# Patient Record
Sex: FEMALE | Race: WHITE | NOT HISPANIC OR LATINO
[De-identification: names, ages, dates, MRNs, and addresses within clinical notes are randomized per-mention and may not be internally consistent; named-entity substitution may affect disease eponyms.]

---

## 2017-01-09 ENCOUNTER — RX RENEWAL (OUTPATIENT)
Age: 67
End: 2017-01-09

## 2017-01-10 ENCOUNTER — MESSAGE (OUTPATIENT)
Age: 67
End: 2017-01-10

## 2017-01-10 LAB
APPEARANCE: CLEAR
BILIRUBIN URINE: NEGATIVE
BLOOD URINE: NEGATIVE
COLOR: YELLOW
GLUCOSE QUALITATIVE U: NORMAL MG/DL
KETONES URINE: NEGATIVE
LEUKOCYTE ESTERASE URINE: NEGATIVE
NITRITE URINE: NEGATIVE
PH URINE: 6.5
PROTEIN URINE: NEGATIVE MG/DL
SPECIFIC GRAVITY URINE: 1.01
UROBILINOGEN URINE: NORMAL MG/DL

## 2017-04-21 ENCOUNTER — APPOINTMENT (OUTPATIENT)
Dept: INTERNAL MEDICINE | Facility: CLINIC | Age: 67
End: 2017-04-21

## 2017-04-21 VITALS
WEIGHT: 128 LBS | BODY MASS INDEX: 18.9 KG/M2 | OXYGEN SATURATION: 95 % | DIASTOLIC BLOOD PRESSURE: 70 MMHG | SYSTOLIC BLOOD PRESSURE: 120 MMHG | RESPIRATION RATE: 15 BRPM | HEART RATE: 80 BPM

## 2017-04-24 LAB
25(OH)D3 SERPL-MCNC: 43.6 NG/ML
ALBUMIN SERPL ELPH-MCNC: 4.1 G/DL
ALP BLD-CCNC: 93 U/L
ALT SERPL-CCNC: 18 U/L
ANION GAP SERPL CALC-SCNC: 20 MMOL/L
APPEARANCE: CLEAR
AST SERPL-CCNC: 17 U/L
BASOPHILS # BLD AUTO: 0.04 K/UL
BASOPHILS NFR BLD AUTO: 0.6 %
BILIRUB SERPL-MCNC: 0.3 MG/DL
BILIRUBIN URINE: NEGATIVE
BLOOD URINE: NEGATIVE
BUN SERPL-MCNC: 17 MG/DL
CALCIUM SERPL-MCNC: 10.2 MG/DL
CHLORIDE SERPL-SCNC: 102 MMOL/L
CHOLEST SERPL-MCNC: 232 MG/DL
CHOLEST/HDLC SERPL: 2.5 RATIO
CO2 SERPL-SCNC: 19 MMOL/L
COLOR: YELLOW
CREAT SERPL-MCNC: 0.86 MG/DL
EOSINOPHIL # BLD AUTO: 0.11 K/UL
EOSINOPHIL NFR BLD AUTO: 1.6 %
GLUCOSE QUALITATIVE U: NORMAL MG/DL
GLUCOSE SERPL-MCNC: 92 MG/DL
HBA1C MFR BLD HPLC: 5.8 %
HCT VFR BLD CALC: 42.2 %
HDLC SERPL-MCNC: 92 MG/DL
HGB BLD-MCNC: 13.3 G/DL
IMM GRANULOCYTES NFR BLD AUTO: 0.1 %
KETONES URINE: NEGATIVE
LDLC SERPL CALC-MCNC: 124 MG/DL
LEUKOCYTE ESTERASE URINE: ABNORMAL
LYMPHOCYTES # BLD AUTO: 2.04 K/UL
LYMPHOCYTES NFR BLD AUTO: 29.6 %
MAN DIFF?: NORMAL
MCHC RBC-ENTMCNC: 31.5 GM/DL
MCHC RBC-ENTMCNC: 32 PG
MCV RBC AUTO: 101.4 FL
MONOCYTES # BLD AUTO: 0.82 K/UL
MONOCYTES NFR BLD AUTO: 11.9 %
NEUTROPHILS # BLD AUTO: 3.88 K/UL
NEUTROPHILS NFR BLD AUTO: 56.2 %
NITRITE URINE: NEGATIVE
PH URINE: 6
PLATELET # BLD AUTO: 352 K/UL
POTASSIUM SERPL-SCNC: 5 MMOL/L
PROT SERPL-MCNC: 7.2 G/DL
PROTEIN URINE: NEGATIVE MG/DL
RBC # BLD: 4.16 M/UL
RBC # FLD: 13.9 %
SODIUM SERPL-SCNC: 141 MMOL/L
SPECIFIC GRAVITY URINE: 1.01
TRIGL SERPL-MCNC: 79 MG/DL
UROBILINOGEN URINE: NORMAL MG/DL
WBC # FLD AUTO: 6.9 K/UL

## 2018-06-04 ENCOUNTER — APPOINTMENT (OUTPATIENT)
Dept: INTERNAL MEDICINE | Facility: CLINIC | Age: 68
End: 2018-06-04
Payer: COMMERCIAL

## 2018-06-04 VITALS
WEIGHT: 128 LBS | DIASTOLIC BLOOD PRESSURE: 76 MMHG | BODY MASS INDEX: 18.9 KG/M2 | SYSTOLIC BLOOD PRESSURE: 146 MMHG | HEART RATE: 83 BPM | OXYGEN SATURATION: 93 % | TEMPERATURE: 98.3 F

## 2018-06-04 PROCEDURE — 99397 PER PM REEVAL EST PAT 65+ YR: CPT | Mod: 25

## 2018-06-04 PROCEDURE — 93000 ELECTROCARDIOGRAM COMPLETE: CPT

## 2018-06-04 PROCEDURE — 36415 COLL VENOUS BLD VENIPUNCTURE: CPT

## 2018-06-04 NOTE — HISTORY OF PRESENT ILLNESS
[FreeTextEntry1] : wellness and insomnia [de-identified] : Under a lot of stress richelle her mother  last year.\par Issue with sleep.  \par Brought in shingrix \par mammo uptodate  Dr Kay\par Last vieira density a few years ago\par FOr colonoscopy for this year -with Dr Myron Goldberg.  \par

## 2018-06-04 NOTE — PLAN
[FreeTextEntry1] : shingrix admin today: lot [id number], exp 7/13/20\par wellness complete\par labs today\par colonoscopy for thie year\par  f/up annually

## 2018-06-05 LAB
ALBUMIN SERPL ELPH-MCNC: 4.8 G/DL
ALP BLD-CCNC: 72 U/L
ALT SERPL-CCNC: 21 U/L
ANION GAP SERPL CALC-SCNC: 17 MMOL/L
APPEARANCE: CLEAR
AST SERPL-CCNC: 25 U/L
BASOPHILS # BLD AUTO: 0.03 K/UL
BASOPHILS NFR BLD AUTO: 0.5 %
BILIRUB SERPL-MCNC: 0.4 MG/DL
BILIRUBIN URINE: NEGATIVE
BLOOD URINE: NEGATIVE
BUN SERPL-MCNC: 28 MG/DL
CALCIUM SERPL-MCNC: 10.2 MG/DL
CHLORIDE SERPL-SCNC: 100 MMOL/L
CHOLEST SERPL-MCNC: 273 MG/DL
CHOLEST/HDLC SERPL: 2.5 RATIO
CO2 SERPL-SCNC: 24 MMOL/L
COLOR: YELLOW
CREAT SERPL-MCNC: 0.9 MG/DL
EOSINOPHIL # BLD AUTO: 0.18 K/UL
EOSINOPHIL NFR BLD AUTO: 3 %
GLUCOSE QUALITATIVE U: NEGATIVE MG/DL
GLUCOSE SERPL-MCNC: 100 MG/DL
HBA1C MFR BLD HPLC: 5.5 %
HCT VFR BLD CALC: 43.6 %
HDLC SERPL-MCNC: 111 MG/DL
HGB BLD-MCNC: 14 G/DL
IMM GRANULOCYTES NFR BLD AUTO: 0 %
KETONES URINE: NEGATIVE
LDLC SERPL CALC-MCNC: 148 MG/DL
LEUKOCYTE ESTERASE URINE: NEGATIVE
LYMPHOCYTES # BLD AUTO: 1.99 K/UL
LYMPHOCYTES NFR BLD AUTO: 32.6 %
MAN DIFF?: NORMAL
MCHC RBC-ENTMCNC: 32.1 GM/DL
MCHC RBC-ENTMCNC: 32.6 PG
MCV RBC AUTO: 101.6 FL
MONOCYTES # BLD AUTO: 0.54 K/UL
MONOCYTES NFR BLD AUTO: 8.9 %
NEUTROPHILS # BLD AUTO: 3.36 K/UL
NEUTROPHILS NFR BLD AUTO: 55 %
NITRITE URINE: NEGATIVE
PH URINE: 6.5
PLATELET # BLD AUTO: 274 K/UL
POTASSIUM SERPL-SCNC: 5 MMOL/L
PROT SERPL-MCNC: 7.4 G/DL
PROTEIN URINE: NEGATIVE MG/DL
RBC # BLD: 4.29 M/UL
RBC # FLD: 14.4 %
SODIUM SERPL-SCNC: 141 MMOL/L
SPECIFIC GRAVITY URINE: 1.01
TRIGL SERPL-MCNC: 72 MG/DL
UROBILINOGEN URINE: NEGATIVE MG/DL
WBC # FLD AUTO: 6.1 K/UL

## 2018-08-20 ENCOUNTER — APPOINTMENT (OUTPATIENT)
Dept: INTERNAL MEDICINE | Facility: CLINIC | Age: 68
End: 2018-08-20
Payer: COMMERCIAL

## 2018-08-20 VITALS
OXYGEN SATURATION: 96 % | TEMPERATURE: 98.5 F | HEIGHT: 69 IN | WEIGHT: 125 LBS | DIASTOLIC BLOOD PRESSURE: 80 MMHG | HEART RATE: 68 BPM | SYSTOLIC BLOOD PRESSURE: 138 MMHG | BODY MASS INDEX: 18.51 KG/M2

## 2018-08-20 DIAGNOSIS — Z23 ENCOUNTER FOR IMMUNIZATION: ICD-10-CM

## 2018-08-20 PROCEDURE — 99212 OFFICE O/P EST SF 10 MIN: CPT | Mod: 25

## 2018-08-20 PROCEDURE — 90750 HZV VACC RECOMBINANT IM: CPT

## 2018-08-20 PROCEDURE — 90471 IMMUNIZATION ADMIN: CPT

## 2018-08-20 NOTE — PHYSICAL EXAM
[No Acute Distress] : no acute distress [Well Nourished] : well nourished [Well Developed] : well developed [Normal Insight/Judgement] : insight and judgment were intact

## 2018-09-24 ENCOUNTER — APPOINTMENT (OUTPATIENT)
Dept: INTERNAL MEDICINE | Facility: CLINIC | Age: 68
End: 2018-09-24
Payer: COMMERCIAL

## 2018-09-24 VITALS
SYSTOLIC BLOOD PRESSURE: 130 MMHG | RESPIRATION RATE: 14 BRPM | TEMPERATURE: 98.8 F | DIASTOLIC BLOOD PRESSURE: 70 MMHG | HEART RATE: 74 BPM | OXYGEN SATURATION: 95 %

## 2018-09-24 DIAGNOSIS — Z23 ENCOUNTER FOR IMMUNIZATION: ICD-10-CM

## 2018-09-24 PROCEDURE — G0008: CPT

## 2018-09-24 PROCEDURE — 99213 OFFICE O/P EST LOW 20 MIN: CPT | Mod: 25

## 2018-09-24 PROCEDURE — 90662 IIV NO PRSV INCREASED AG IM: CPT

## 2018-09-24 NOTE — HISTORY OF PRESENT ILLNESS
[FreeTextEntry1] : blood pressure check [de-identified] : Doing well on losartan, no issue\par would like flu shot  today

## 2019-04-16 ENCOUNTER — FORM ENCOUNTER (OUTPATIENT)
Age: 69
End: 2019-04-16

## 2019-04-17 ENCOUNTER — APPOINTMENT (OUTPATIENT)
Dept: ULTRASOUND IMAGING | Facility: HOSPITAL | Age: 69
End: 2019-04-17

## 2019-04-17 ENCOUNTER — OUTPATIENT (OUTPATIENT)
Dept: OUTPATIENT SERVICES | Facility: HOSPITAL | Age: 69
LOS: 1 days | End: 2019-04-17
Payer: COMMERCIAL

## 2019-04-17 PROCEDURE — 93970 EXTREMITY STUDY: CPT

## 2019-04-17 PROCEDURE — 93970 EXTREMITY STUDY: CPT | Mod: 26

## 2019-05-02 ENCOUNTER — APPOINTMENT (OUTPATIENT)
Dept: PHYSICAL MEDICINE AND REHAB | Facility: CLINIC | Age: 69
End: 2019-05-02
Payer: COMMERCIAL

## 2019-05-02 ENCOUNTER — FORM ENCOUNTER (OUTPATIENT)
Age: 69
End: 2019-05-02

## 2019-05-02 VITALS
OXYGEN SATURATION: 97 % | BODY MASS INDEX: 18.51 KG/M2 | RESPIRATION RATE: 16 BRPM | HEART RATE: 74 BPM | HEIGHT: 69 IN | WEIGHT: 125 LBS

## 2019-05-02 DIAGNOSIS — Z87.09 PERSONAL HISTORY OF OTHER DISEASES OF THE RESPIRATORY SYSTEM: ICD-10-CM

## 2019-05-02 DIAGNOSIS — Z80.0 FAMILY HISTORY OF MALIGNANT NEOPLASM OF DIGESTIVE ORGANS: ICD-10-CM

## 2019-05-02 DIAGNOSIS — Z86.79 PERSONAL HISTORY OF OTHER DISEASES OF THE CIRCULATORY SYSTEM: ICD-10-CM

## 2019-05-02 DIAGNOSIS — Z87.39 PERSONAL HISTORY OF OTHER DISEASES OF THE MUSCULOSKELETAL SYSTEM AND CONNECTIVE TISSUE: ICD-10-CM

## 2019-05-02 DIAGNOSIS — Z82.62 FAMILY HISTORY OF OSTEOPOROSIS: ICD-10-CM

## 2019-05-02 PROCEDURE — 99203 OFFICE O/P NEW LOW 30 MIN: CPT

## 2019-05-03 ENCOUNTER — APPOINTMENT (OUTPATIENT)
Dept: RADIOLOGY | Facility: CLINIC | Age: 69
End: 2019-05-03

## 2019-05-03 ENCOUNTER — OUTPATIENT (OUTPATIENT)
Dept: OUTPATIENT SERVICES | Facility: HOSPITAL | Age: 69
LOS: 1 days | End: 2019-05-03
Payer: COMMERCIAL

## 2019-05-03 ENCOUNTER — MESSAGE (OUTPATIENT)
Age: 69
End: 2019-05-03

## 2019-05-03 ENCOUNTER — APPOINTMENT (OUTPATIENT)
Dept: ORTHOPEDIC SURGERY | Facility: CLINIC | Age: 69
End: 2019-05-03
Payer: COMMERCIAL

## 2019-05-03 VITALS — RESPIRATION RATE: 16 BRPM | HEIGHT: 69 IN | WEIGHT: 125 LBS | BODY MASS INDEX: 18.51 KG/M2

## 2019-05-03 PROCEDURE — 73630 X-RAY EXAM OF FOOT: CPT | Mod: 26,RT

## 2019-05-03 PROCEDURE — 73630 X-RAY EXAM OF FOOT: CPT

## 2019-05-03 PROCEDURE — 73610 X-RAY EXAM OF ANKLE: CPT | Mod: 26,RT

## 2019-05-03 PROCEDURE — 99203 OFFICE O/P NEW LOW 30 MIN: CPT

## 2019-05-03 PROCEDURE — 73610 X-RAY EXAM OF ANKLE: CPT

## 2019-05-04 ENCOUNTER — TRANSCRIPTION ENCOUNTER (OUTPATIENT)
Age: 69
End: 2019-05-04

## 2019-05-05 NOTE — HISTORY OF PRESENT ILLNESS
[FreeTextEntry1] : Referring Provider: Dr. Kevin Resendez\par PCP: Dr. Kristofer Rogers\par Chief Complaint: Right ankle and calf pain \par Date of Injury/onset: 02/20/2019\par \par 68 year old female community ambulator presents for an evaluation of right ankle and calf pain that has been ongoing for the past 2 months without any specific injury. She reports that she was walking on the sidewalk when she started having a lot of tightness and pain in the ankle. \par

## 2019-05-05 NOTE — DISCUSSION/SUMMARY
[de-identified] : 67 yo F with right calf pain consistent with vascular claudication in the setting of a gastrocnemius contracture. I had a long conversation with the patient regarding her pathology. She had previously had a duplex ultrasound which did not show evidence of a DVT, however, there was not arterial evaluation. We will initiate conservative treatment of her gastroc contracture and see if she can improved with aggressive PT and stretching regimen. Considering the patient feels better in heels, I am hopefully she will improve appropriately with conservative therapy. \par  1. Follow up in 2 months. No new images needed at follow up \par 2. Initiate physical therapy for gastroc stretching \par 3.Patient was given a handout explaining posterior chain stretching, a theraband for resistance exercise and gastroc stretching and explained the importance of stretching.\par 4. Posterior heel wedges were given to patient\par 5. Information on gel heel cups, slant board and night splints\par 5. Referral to Vascular was given to see patient

## 2019-05-05 NOTE — CONSULT LETTER
[Dear  ___] : Dear  [unfilled], [FreeTextEntry2] : Dr. Kristofer Rogers [FreeTextEntry1] : Today I had the pleasure of evaluating your very nice patient SALVATORE ESCOBEDO who requested that I share my findings with you. I very much appreciate the referral. \par  \par Please review my office note below and, needless to say, please call or email me with any questions or concerns.\par  \par I appreciate the opportunity to participate in her care.\par  \par Sincerely,\par  \par Rudy Myesr MD\par Orthopaedic Surgery\par Foot and Ankle Surgery\par [facility]\par Office: [phone]\par Fax:     [phone]\par Email:  [email or web address] \par [email or web address]\par \par

## 2019-05-05 NOTE — REVIEW OF SYSTEMS
[Joint Stiffness] : joint stiffness [Joint Pain] : joint pain [Joint Swelling] : joint swelling [FreeTextEntry9] : Review of Systems no feelings of weakness \par All other review of systems are negative except as noted. \par Constitutional: no chills, not feeling tired and no fever. \par Eyes: no discharge, no pain and no redness. \par ENT: no nasal discharge, no nosebleeds and no sore throat. \par Cardiovascular: no chest pain, no palpitations and the heart rate was not fast. \par Respiratory: no shortness of breath, no cough and no wheezing. \par Gastrointestinal: no abdominal pain, no diarrhea, no vomiting and no melena. \par Genitourinary: no pelvic pain, no dysuria, no abnormal urethral discharge and no frequency. \par Integumentary: no skin lesions and no change in a mole. \par Neurological: no dizziness, no fainting and no convulsions. \par Endocrine: no deepening of the voice and no hot flashes. \par Hematologic/Lymphatic: does not bleed easily, no swollen glands and no cervical lymphadenopathy.\par Musculoskeletal: [as per HPI, otherwise denies additional joint pain, effusions, stiffness.]\par \par Please refer to the attached intake form for additional history and details.

## 2019-05-05 NOTE — PHYSICAL EXAM
[de-identified] : General: Patient is awake and alert, demonstrates appropriate mood and affect, exhibits normal breathing and is in no acute distress.\par Psych:  The patient is appropriately dressed and groomed, maintains good eye contact.  Alert and oriented x 3.  Normal attention/concentration, fund of knowledge and recall.  Normal speech rate and rhythm. No hallucinations, suicidal or homicidal ideations.  Demonstrates expected level of insight and judgment regarding health.\par Skin: The patient has no chronic skin lesions, rashes, or ulcers.  There is no induration or erythema of uninvolved extremities.  For skin exam of involved extremity refer to detailed musculoskeletal/extremity exam. \par Cardiovascular: No visible jugular venous distention.  There is brisk capillary refill in the digits of the affected extremity. They are symmetric pulses in the bilateral upper and lower extremities. \par Respiratory: The patient is in no apparent respiratory distress. They're taking full deep breaths with normal excursion, without use of accessory muscles or evidence of audible wheezes or stridor without the use of a stethoscope. \par Neurological: 5/5 motor strength and sensation intact throughout uninvolved upper and lower extremities, refer to detailed musculoskeletal exam regarding involved extremity.\par Neck:  Full range of motion with flexion, extension, rotation, and side bending;  no palpable crepitus, normal alignment and lordosis, symmetric appearance, midline trachea, no thyroid hypertrophy or nodules\par \par +Bilateral gastrocnemius contractures with inability to dorsiflex beyond 5 degrees with knee extended bilaterally\par + painless varicosities\par \par RLE \par No TTP about the tibial shaft or posteromedial aspect of the tibia\par No calf TTP\par SILT SSSPDPT\par +EHL FHL TA GSC\par No pain with single limb heel rise\par BCR WWP, compartments soft and compressible\par Multiple distended varicosities that are non TTP\par Skin intact, poor skin turgor [de-identified] : WB R ankle xrays. no fractures dislocations or subluxations. ankle mortise intact.

## 2019-05-20 ENCOUNTER — APPOINTMENT (OUTPATIENT)
Dept: VASCULAR SURGERY | Facility: CLINIC | Age: 69
End: 2019-05-20
Payer: COMMERCIAL

## 2019-05-20 ENCOUNTER — APPOINTMENT (OUTPATIENT)
Dept: INTERNAL MEDICINE | Facility: CLINIC | Age: 69
End: 2019-05-20
Payer: COMMERCIAL

## 2019-05-20 VITALS
TEMPERATURE: 98.5 F | OXYGEN SATURATION: 95 % | HEART RATE: 82 BPM | DIASTOLIC BLOOD PRESSURE: 80 MMHG | WEIGHT: 127 LBS | SYSTOLIC BLOOD PRESSURE: 150 MMHG | BODY MASS INDEX: 18.75 KG/M2 | RESPIRATION RATE: 14 BRPM

## 2019-05-20 VITALS — DIASTOLIC BLOOD PRESSURE: 88 MMHG | SYSTOLIC BLOOD PRESSURE: 167 MMHG | OXYGEN SATURATION: 93 % | HEART RATE: 87 BPM

## 2019-05-20 PROCEDURE — 99214 OFFICE O/P EST MOD 30 MIN: CPT

## 2019-05-20 PROCEDURE — 36415 COLL VENOUS BLD VENIPUNCTURE: CPT

## 2019-05-20 PROCEDURE — 93923 UPR/LXTR ART STDY 3+ LVLS: CPT

## 2019-05-20 PROCEDURE — 99244 OFF/OP CNSLTJ NEW/EST MOD 40: CPT

## 2019-05-20 RX ORDER — LOSARTAN POTASSIUM 50 MG/1
50 TABLET, FILM COATED ORAL DAILY
Qty: 90 | Refills: 3 | Status: DISCONTINUED | COMMUNITY
Start: 2018-08-27 | End: 2019-05-20

## 2019-05-20 RX ORDER — CILOSTAZOL 50 MG/1
50 TABLET ORAL
Qty: 180 | Refills: 1 | Status: DISCONTINUED | COMMUNITY
Start: 2019-05-20 | End: 2019-05-20

## 2019-05-20 NOTE — PLAN
[FreeTextEntry1] : Uncontrolled HTN - May be related to Losartan- not as effective as valsartan?\par Change to losartan 100/25mg and check labs today\par get a bp cuff for home and keep a diary for home\par f/up 4 weeks in the office or sooner if bp not controled

## 2019-05-20 NOTE — PHYSICAL EXAM
[No Acute Distress] : no acute distress [Well Nourished] : well nourished [Well Developed] : well developed [Normal Oropharynx] : the oropharynx was normal [Supple] : supple [No Lymphadenopathy] : no lymphadenopathy [No Respiratory Distress] : no respiratory distress  [Clear to Auscultation] : lungs were clear to auscultation bilaterally [No Accessory Muscle Use] : no accessory muscle use [Normal Rate] : normal rate  [Regular Rhythm] : with a regular rhythm [Normal S1, S2] : normal S1 and S2 [No Murmur] : no murmur heard [No Edema] : there was no peripheral edema [Normal Affect] : the affect was normal [Normal Insight/Judgement] : insight and judgment were intact

## 2019-05-21 LAB
ANION GAP SERPL CALC-SCNC: 14 MMOL/L
BUN SERPL-MCNC: 20 MG/DL
CALCIUM SERPL-MCNC: 10.2 MG/DL
CHLORIDE SERPL-SCNC: 102 MMOL/L
CO2 SERPL-SCNC: 24 MMOL/L
CREAT SERPL-MCNC: 0.8 MG/DL
GLUCOSE SERPL-MCNC: 101 MG/DL
MAGNESIUM SERPL-MCNC: 2.2 MG/DL
POTASSIUM SERPL-SCNC: 4.6 MMOL/L
SODIUM SERPL-SCNC: 140 MMOL/L
TSH SERPL-ACNC: 1.9 UIU/ML

## 2019-05-23 NOTE — ADDENDUM
[FreeTextEntry1] : This note was written by Rosemary Colindres on 05/20/2019  acting as scribe for Dr. Hinton.

## 2019-05-23 NOTE — HISTORY OF PRESENT ILLNESS
[FreeTextEntry1] : 69 y/o F with HTN and asthma s/p melanoma resection in the back (2011) and calf (2014) and s/p breast aspirations and 2 cysts removed in the 1990s presents today for initial evaluation of claudication in the RLE that began at the start of 2019. She reports a feeling of "tightness, twisting and pulling" in the right calf when she walks. Patient reports that she can only walk half a block before her R calf begins to hurt but she continues walking through the pain. She consulted with orthopedist, Dr. Myers, who sent her to physical therapy. She has been doing calf stretches in PT and reports that her pain has improved.

## 2019-05-23 NOTE — PHYSICAL EXAM
[Respiratory Effort] : normal respiratory effort [Alert] : alert [Calm] : calm [No Rash or Lesion] : No rash or lesion [JVD] : no jugular venous distention  [de-identified] : Well appearing. NAD [de-identified] : NCAT [de-identified] : FROM

## 2019-05-23 NOTE — END OF VISIT
[FreeTextEntry3] : All medical record entries made by the Scribe were at my, Dr. Lopez's, discretion and personally dictated by me on 05/20/2019 . I have reviewed the chart and agree that the record accurately reflects my personal performance of the history, physical exam, assessment and plan. I have also personally directed, reviewed and agreed to the chart.

## 2019-05-23 NOTE — ASSESSMENT
[FreeTextEntry1] : 69 y/o F with HTN and asthma presents with claudication in the RLE. RLE US demonstrates blunted waveforms with monophasic appearance. Moderate decrease in pressure and abnormal ankle brachial index suggestive of arterial occlusive disease consistent with arterial claudication. Physical therapy may relieve symptoms but it won't get rid of the blockage. I prescribed Pleital BID and advised her to walk daily for 6 weeks. She should walk 5 blocks in the morning and 5 blocks at night without stopping. Increase the distance by one block every 5 days. If her pain doesn't improve, we may schedule an angioplasty. \par Follow up in 6 weeks.

## 2019-05-23 NOTE — PROCEDURE
[FreeTextEntry1] : RLE US demonstrates blunted waveforms with monophasic appearance. Moderate decrease in pressure and abnormal ankle brachial index suggestive of arterial occlusive disease consistent with arterial claudication.

## 2019-05-31 ENCOUNTER — CLINICAL ADVICE (OUTPATIENT)
Age: 69
End: 2019-05-31

## 2019-06-03 ENCOUNTER — MESSAGE (OUTPATIENT)
Age: 69
End: 2019-06-03

## 2019-06-26 ENCOUNTER — LABORATORY RESULT (OUTPATIENT)
Age: 69
End: 2019-06-26

## 2019-06-26 ENCOUNTER — APPOINTMENT (OUTPATIENT)
Dept: INTERNAL MEDICINE | Facility: CLINIC | Age: 69
End: 2019-06-26
Payer: COMMERCIAL

## 2019-06-26 VITALS
HEIGHT: 69 IN | SYSTOLIC BLOOD PRESSURE: 144 MMHG | TEMPERATURE: 98.5 F | HEART RATE: 101 BPM | WEIGHT: 124 LBS | DIASTOLIC BLOOD PRESSURE: 74 MMHG | BODY MASS INDEX: 18.37 KG/M2

## 2019-06-26 PROCEDURE — 93000 ELECTROCARDIOGRAM COMPLETE: CPT

## 2019-06-26 PROCEDURE — 99397 PER PM REEVAL EST PAT 65+ YR: CPT

## 2019-06-26 PROCEDURE — 36415 COLL VENOUS BLD VENIPUNCTURE: CPT

## 2019-06-26 RX ORDER — BENZONATATE 200 MG/1
200 CAPSULE ORAL
Qty: 21 | Refills: 1 | Status: DISCONTINUED | COMMUNITY
Start: 2017-04-21 | End: 2019-06-26

## 2019-06-26 RX ORDER — LOSARTAN POTASSIUM AND HYDROCHLOROTHIAZIDE 25; 100 MG/1; MG/1
100-25 TABLET ORAL DAILY
Qty: 30 | Refills: 5 | Status: DISCONTINUED | COMMUNITY
Start: 2019-05-20 | End: 2019-06-26

## 2019-06-26 RX ORDER — AZITHROMYCIN 250 MG/1
250 TABLET, FILM COATED ORAL
Qty: 6 | Refills: 0 | Status: DISCONTINUED | COMMUNITY
Start: 2018-12-03 | End: 2019-06-26

## 2019-06-26 RX ORDER — OMEGA-3/DHA/EPA/FISH OIL 300-1000MG
CAPSULE ORAL
Refills: 0 | Status: ACTIVE | COMMUNITY

## 2019-06-26 RX ORDER — LOSARTAN POTASSIUM 100 MG/1
100 TABLET, FILM COATED ORAL
Qty: 30 | Refills: 5 | Status: DISCONTINUED | COMMUNITY
Start: 2019-05-30 | End: 2019-06-26

## 2019-06-26 RX ORDER — ZOSTER VACCINE RECOMBINANT, ADJUVANTED 50 MCG/0.5
50 KIT INTRAMUSCULAR
Qty: 1 | Refills: 1 | Status: DISCONTINUED | COMMUNITY
Start: 2018-05-08 | End: 2019-06-26

## 2019-06-26 RX ORDER — ONDANSETRON 4 MG/1
4 TABLET ORAL DAILY
Qty: 10 | Refills: 0 | Status: DISCONTINUED | COMMUNITY
Start: 2018-08-20 | End: 2019-06-26

## 2019-06-26 NOTE — HISTORY OF PRESENT ILLNESS
[FreeTextEntry1] : wellness [de-identified] : Having multiple issues\par  - Hip bursitis - right worse than left - getting PT.  \par \par Trying to walk regularly - on 1/2 dose as she did not tolerate it.  \par  - walking 2-3 miles a day, walking through the pain.  \par \par

## 2019-06-26 NOTE — PLAN
[FreeTextEntry1] : Wellness complete\par  labs today\par change to valsartan and monitor BP\par continue with ambulation andf/up with Dr Lopez\par

## 2019-06-27 ENCOUNTER — MESSAGE (OUTPATIENT)
Age: 69
End: 2019-06-27

## 2019-06-27 LAB
ALBUMIN SERPL ELPH-MCNC: 4.5 G/DL
ALP BLD-CCNC: 74 U/L
ALT SERPL-CCNC: 20 U/L
ANION GAP SERPL CALC-SCNC: 16 MMOL/L
APPEARANCE: ABNORMAL
AST SERPL-CCNC: 20 U/L
BASOPHILS # BLD AUTO: 0.07 K/UL
BASOPHILS NFR BLD AUTO: 0.9 %
BILIRUB SERPL-MCNC: 0.5 MG/DL
BILIRUBIN URINE: NEGATIVE
BLOOD URINE: NEGATIVE
BUN SERPL-MCNC: 22 MG/DL
CALCIUM SERPL-MCNC: 10.4 MG/DL
CHLORIDE SERPL-SCNC: 101 MMOL/L
CHOLEST SERPL-MCNC: 274 MG/DL
CHOLEST/HDLC SERPL: 2.3 RATIO
CO2 SERPL-SCNC: 22 MMOL/L
COLOR: NORMAL
CREAT SERPL-MCNC: 0.9 MG/DL
EOSINOPHIL # BLD AUTO: 0.13 K/UL
EOSINOPHIL NFR BLD AUTO: 1.8 %
ESTIMATED AVERAGE GLUCOSE: 105 MG/DL
GLUCOSE QUALITATIVE U: NEGATIVE
GLUCOSE SERPL-MCNC: 76 MG/DL
HBA1C MFR BLD HPLC: 5.3 %
HCT VFR BLD CALC: 44.5 %
HDLC SERPL-MCNC: 117 MG/DL
HGB BLD-MCNC: 14.1 G/DL
IMM GRANULOCYTES NFR BLD AUTO: 0.3 %
KETONES URINE: NEGATIVE
LDLC SERPL CALC-MCNC: 142 MG/DL
LEUKOCYTE ESTERASE URINE: ABNORMAL
LYMPHOCYTES # BLD AUTO: 2.1 K/UL
LYMPHOCYTES NFR BLD AUTO: 28.4 %
MAN DIFF?: NORMAL
MCHC RBC-ENTMCNC: 31.7 GM/DL
MCHC RBC-ENTMCNC: 33.7 PG
MCV RBC AUTO: 106.2 FL
MEV IGG FLD QL IA: >300 AU/ML
MEV IGG+IGM SER-IMP: POSITIVE
MONOCYTES # BLD AUTO: 0.7 K/UL
MONOCYTES NFR BLD AUTO: 9.5 %
NEUTROPHILS # BLD AUTO: 4.38 K/UL
NEUTROPHILS NFR BLD AUTO: 59.1 %
NITRITE URINE: POSITIVE
PH URINE: 6
PLATELET # BLD AUTO: 299 K/UL
POTASSIUM SERPL-SCNC: 4.9 MMOL/L
PROT SERPL-MCNC: 7.2 G/DL
PROTEIN URINE: NEGATIVE
RBC # BLD: 4.19 M/UL
RBC # FLD: 14.1 %
SODIUM SERPL-SCNC: 139 MMOL/L
SPECIFIC GRAVITY URINE: 1.01
TRIGL SERPL-MCNC: 74 MG/DL
TSH SERPL-ACNC: 2.31 UIU/ML
UROBILINOGEN URINE: NORMAL
WBC # FLD AUTO: 7.4 K/UL

## 2019-07-01 ENCOUNTER — APPOINTMENT (OUTPATIENT)
Dept: VASCULAR SURGERY | Facility: CLINIC | Age: 69
End: 2019-07-01
Payer: COMMERCIAL

## 2019-07-01 PROCEDURE — 93880 EXTRACRANIAL BILAT STUDY: CPT

## 2019-07-01 PROCEDURE — 99215 OFFICE O/P EST HI 40 MIN: CPT

## 2019-07-03 NOTE — HISTORY OF PRESENT ILLNESS
[FreeTextEntry1] : 69 y/o F with HTN and asthma presents today for follow up evaluation of claudication in the RLE that began at the start of 2019. She reports that she has been walking 2-3 miles a day. Her pain has improved although it is still present with walking. She has been compliant with Pletal. \par Patient reports that she went to her PCP, Dr. Rogers, last week and she was diagnosed with HLD and is now on Lipitor.

## 2019-07-03 NOTE — ADDENDUM
[FreeTextEntry1] : This note was written by Rosemary Colindres on 07/01/2019  acting as scribe for Dr. Hinton.\par

## 2019-07-03 NOTE — END OF VISIT
[FreeTextEntry3] : All medical record entries made by the Scribe were at my, Dr. Lopez's, discretion and personally dictated by me on 07/01/2019 . I have reviewed the chart and agree that the record accurately reflects my personal performance of the history, physical exam, assessment and plan. I have also personally directed, reviewed and agreed to the chart.

## 2019-07-03 NOTE — ASSESSMENT
[FreeTextEntry1] : 69 y/o F with HTN and asthma presents for follow up of claudication in the RLE. She has been walking well, pain has been improving. B/l carotid US demonstrates moderate carotid stenosis, bilaterally. I will speak to her PCP, Dr. Rogers, about this finding. C/w Lipitor and Pletal\par Follow up in 3 months.

## 2019-07-03 NOTE — PHYSICAL EXAM
[Respiratory Effort] : normal respiratory effort [No Rash or Lesion] : No rash or lesion [Alert] : alert [Calm] : calm [JVD] : no jugular venous distention  [de-identified] : Well appearing. NAD [de-identified] : NCAT [de-identified] : FROM

## 2019-07-16 ENCOUNTER — FORM ENCOUNTER (OUTPATIENT)
Age: 69
End: 2019-07-16

## 2019-07-17 ENCOUNTER — APPOINTMENT (OUTPATIENT)
Dept: RADIOLOGY | Facility: CLINIC | Age: 69
End: 2019-07-17

## 2019-07-17 ENCOUNTER — RX RENEWAL (OUTPATIENT)
Age: 69
End: 2019-07-17

## 2019-07-17 ENCOUNTER — APPOINTMENT (OUTPATIENT)
Dept: ORTHOPEDIC SURGERY | Facility: CLINIC | Age: 69
End: 2019-07-17
Payer: COMMERCIAL

## 2019-07-17 ENCOUNTER — OUTPATIENT (OUTPATIENT)
Dept: OUTPATIENT SERVICES | Facility: HOSPITAL | Age: 69
LOS: 1 days | End: 2019-07-17
Payer: COMMERCIAL

## 2019-07-17 VITALS — WEIGHT: 124 LBS | RESPIRATION RATE: 16 BRPM | HEIGHT: 69 IN | BODY MASS INDEX: 18.37 KG/M2

## 2019-07-17 DIAGNOSIS — M25.551 PAIN IN RIGHT HIP: ICD-10-CM

## 2019-07-17 DIAGNOSIS — M79.661 PAIN IN RIGHT LOWER LEG: ICD-10-CM

## 2019-07-17 DIAGNOSIS — M21.6X1 OTHER ACQUIRED DEFORMITIES OF RIGHT FOOT: ICD-10-CM

## 2019-07-17 PROCEDURE — 73630 X-RAY EXAM OF FOOT: CPT | Mod: 26,RT

## 2019-07-17 PROCEDURE — 73610 X-RAY EXAM OF ANKLE: CPT | Mod: 26,RT

## 2019-07-17 PROCEDURE — 73610 X-RAY EXAM OF ANKLE: CPT

## 2019-07-17 PROCEDURE — 99214 OFFICE O/P EST MOD 30 MIN: CPT

## 2019-07-17 PROCEDURE — 73630 X-RAY EXAM OF FOOT: CPT

## 2019-08-12 ENCOUNTER — APPOINTMENT (OUTPATIENT)
Dept: INTERNAL MEDICINE | Facility: CLINIC | Age: 69
End: 2019-08-12
Payer: COMMERCIAL

## 2019-08-12 VITALS
SYSTOLIC BLOOD PRESSURE: 140 MMHG | TEMPERATURE: 98 F | HEIGHT: 69 IN | RESPIRATION RATE: 16 BRPM | OXYGEN SATURATION: 95 % | HEART RATE: 95 BPM | DIASTOLIC BLOOD PRESSURE: 80 MMHG | BODY MASS INDEX: 18.37 KG/M2 | WEIGHT: 124 LBS

## 2019-08-12 DIAGNOSIS — E78.49 OTHER HYPERLIPIDEMIA: ICD-10-CM

## 2019-08-12 PROCEDURE — 99214 OFFICE O/P EST MOD 30 MIN: CPT

## 2019-08-12 PROCEDURE — 36415 COLL VENOUS BLD VENIPUNCTURE: CPT

## 2019-08-12 RX ORDER — CIPROFLOXACIN HYDROCHLORIDE 500 MG/1
500 TABLET, FILM COATED ORAL
Qty: 10 | Refills: 0 | Status: DISCONTINUED | COMMUNITY
Start: 2019-07-09 | End: 2019-08-12

## 2019-08-12 NOTE — PLAN
[FreeTextEntry1] : HLD- continue statin and check lipids today\par \par HTN- although mildly elevtaed today, has been well controlled on Valsartan- continue\par \par Claudication sig improved continue with cilostazol and routine exercise

## 2019-08-13 LAB
CHOLEST SERPL-MCNC: 239 MG/DL
CHOLEST/HDLC SERPL: 1.7 RATIO
HDLC SERPL-MCNC: 140 MG/DL
LDLC SERPL CALC-MCNC: 88 MG/DL
TRIGL SERPL-MCNC: 53 MG/DL

## 2019-08-16 ENCOUNTER — APPOINTMENT (OUTPATIENT)
Dept: ORTHOPEDIC SURGERY | Facility: CLINIC | Age: 69
End: 2019-08-16
Payer: COMMERCIAL

## 2019-08-16 VITALS — BODY MASS INDEX: 18.37 KG/M2 | WEIGHT: 124 LBS | HEIGHT: 69 IN

## 2019-08-16 DIAGNOSIS — M70.61 TROCHANTERIC BURSITIS, RIGHT HIP: ICD-10-CM

## 2019-08-16 PROCEDURE — 20611 DRAIN/INJ JOINT/BURSA W/US: CPT | Mod: RT

## 2019-08-19 DIAGNOSIS — M25.552 PAIN IN LEFT HIP: ICD-10-CM

## 2019-09-23 ENCOUNTER — APPOINTMENT (OUTPATIENT)
Age: 69
End: 2019-09-23
Payer: COMMERCIAL

## 2019-09-23 DIAGNOSIS — Z91.041 RADIOGRAPHIC DYE ALLERGY STATUS: ICD-10-CM

## 2019-09-23 PROCEDURE — 93880 EXTRACRANIAL BILAT STUDY: CPT

## 2019-09-23 PROCEDURE — 99215 OFFICE O/P EST HI 40 MIN: CPT

## 2019-09-24 NOTE — HISTORY OF PRESENT ILLNESS
[FreeTextEntry1] : 69 y/o F with HTN, HLD, and asthma, s/p melanoma resection in the back (2011) and calf (2014) and s/p breast aspirations and 2 cysts removed in the 1990s  presents today for follow up evaluation of claudication in the RLE that began at the start of 2019. Pt states she has been walking recently, but notes occasional some claudication, not as severe as before. Pt denies CVA/TIA symptoms.

## 2019-09-24 NOTE — END OF VISIT
[FreeTextEntry3] : All medical record entries made by the Scribe were at my, Dr. May Lopez, direction and personally dictated by me on 09/23/2019 I have reviewed the chart and agree that the record accurately reflects my personal performance of the history, physical exam, assessment and plan. I have also personally directed, reviewed and agreed with the chart.

## 2019-09-24 NOTE — PHYSICAL EXAM
[Respiratory Effort] : normal respiratory effort [No Rash or Lesion] : No rash or lesion [Alert] : alert [Calm] : calm [JVD] : no jugular venous distention  [de-identified] : Well appearing. NAD [de-identified] : NCAT [de-identified] : FROM

## 2019-09-24 NOTE — ASSESSMENT
[FreeTextEntry1] : 67 y/o F with HTN, HLD, and asthma, s/p melanoma resection in the back (2011) and calf (2014) and s/p breast aspirations and 2 cysts removed in the 1990s presents RLE claudication. B/l carotid US demonstrates > 70% fibrocalcification, L with shadow.\par Pt is able to ambulate with occasional claudication. Pt denies CVA/TIA symptoms.\par Informed pt to have a neck CAT scan completed and f/u within 2-3 weeks with results.

## 2019-09-24 NOTE — ADDENDUM
[FreeTextEntry1] : I, Micah Cuevas, acted solely as a scribe for Dr. May Lopez on this date. 09/23/2019

## 2019-09-30 PROBLEM — Z91.041 CONTRAST MEDIA ALLERGY: Status: ACTIVE | Noted: 2019-09-30

## 2019-10-02 ENCOUNTER — FORM ENCOUNTER (OUTPATIENT)
Age: 69
End: 2019-10-02

## 2019-10-03 ENCOUNTER — APPOINTMENT (OUTPATIENT)
Dept: CT IMAGING | Facility: HOSPITAL | Age: 69
End: 2019-10-03
Payer: COMMERCIAL

## 2019-10-03 ENCOUNTER — OUTPATIENT (OUTPATIENT)
Dept: OUTPATIENT SERVICES | Facility: HOSPITAL | Age: 69
LOS: 1 days | End: 2019-10-03
Payer: COMMERCIAL

## 2019-10-03 PROCEDURE — 70498 CT ANGIOGRAPHY NECK: CPT | Mod: 26

## 2019-10-03 PROCEDURE — 70498 CT ANGIOGRAPHY NECK: CPT

## 2019-10-06 PROBLEM — M70.61 GREATER TROCHANTERIC BURSITIS OF RIGHT HIP: Status: ACTIVE | Noted: 2019-07-17

## 2019-10-06 NOTE — PROCEDURE
[de-identified] : Pt. is 68 yrs old who presents today for a follow up of right hip pain due to greater trochanteric bursitis. She has been doing PT for gastrocnemius equinus of lower extremities that has exacerbated the pain of right hip. As per last visit discussion she is here today for a corticosteroid inj. of right hip. \par \par Injection: Ultrasound-guided Right hip trochanteric bursa.\par Indication: Right Trochanteric bursitis.\par \par A discussion was had with the patient regarding this procedure and all questions were answered. All risks, benefits and alternatives were discussed. These included but were not limited to bleeding, infection, and allergic reaction. The patient lay in the right lateral decubitus position and the point of maximal tenderness over the right greater trochanter was localized. Ultrasound localization of the greater trochanteric bursa was identified with increased fluid within the bursa sac which was identified. This corresponded with the area of maximal point tenderness. Alcohol was then used to clean the skin, and chlorhexidine was used to sterilize and prep the area in this location on lateral aspect of the right hip. Ethyl chloride spray was then used as a topical anesthetic. A 21-gauge needle was used to inject 3cc of 1% lidocaine. 3 cc 0.25% Bupivacaine and 1cc of 4 mg/ml dexamethasone  into the left trochanteric bursa. A sterile bandage was then applied. The patient tolerated the procedure well and there were no complications.\par \par Follow up PRN in 6 weeks if pain has not improved.

## 2019-10-18 ENCOUNTER — APPOINTMENT (OUTPATIENT)
Dept: VASCULAR SURGERY | Facility: CLINIC | Age: 69
End: 2019-10-18
Payer: COMMERCIAL

## 2019-10-18 PROCEDURE — 99215 OFFICE O/P EST HI 40 MIN: CPT

## 2019-10-18 NOTE — PHYSICAL EXAM
[Carotid Bruits] : carotid bruit  [Normal Breath Sounds] : Normal breath sounds [Respiratory Effort] : normal respiratory effort [Normal Heart Sounds] : normal heart sounds [Left Carotid Bruit] : left carotid bruit heard [2+] : left 2+ [No Rash or Lesion] : No rash or lesion [Calm] : calm [Alert] : alert [JVD] : no jugular venous distention  [Right Carotid Bruit] : no bruit heard over the right carotid [de-identified] : Well appearing. NAD [de-identified] : NCAT [de-identified] : supple [de-identified] : FROM

## 2019-10-18 NOTE — HISTORY OF PRESENT ILLNESS
[FreeTextEntry1] : 69 y/o F with HTN, HLD, PAD, on Cilostazol, Carotid artery disease who was in the office three wekks ago and carotid duplex was done suggestive of > 70% fibrocalcification, L with shadow. Patient was recommended to have CTA of the neck that was done and today she presents to discuss the results and further plan. Pt states she has been walking recently, but notes occasional some claudication, not as severe as before. Pt denies CVA/TIA symptoms.\par \par CTA of neck: 10/3/19 severe stenosis of L ICA

## 2019-10-18 NOTE — ASSESSMENT
[Carotid Endarterectomy] : carotid endarterectomy [FreeTextEntry1] : 69 y/o F with B/l carotid US demonstrates > 70% fibrocalcification, L with shadow by carotid duplex, s/p CTA of neck that confirmed severe stenosis of L ICA returns to discuss the findings.\par Patient and her spouse were explained that L ICA requires carotid endarterectomy.\par Risks and benefits were explained, all questions answered.\par Patient would like to proceed as soon as possible.\par She will see Dr. Rogers for clearance.\par

## 2019-10-21 ENCOUNTER — LABORATORY RESULT (OUTPATIENT)
Age: 69
End: 2019-10-21

## 2019-10-21 ENCOUNTER — APPOINTMENT (OUTPATIENT)
Dept: INTERNAL MEDICINE | Facility: CLINIC | Age: 69
End: 2019-10-21
Payer: COMMERCIAL

## 2019-10-21 VITALS
RESPIRATION RATE: 16 BRPM | DIASTOLIC BLOOD PRESSURE: 84 MMHG | SYSTOLIC BLOOD PRESSURE: 140 MMHG | TEMPERATURE: 98 F | HEART RATE: 86 BPM

## 2019-10-21 PROCEDURE — 93000 ELECTROCARDIOGRAM COMPLETE: CPT

## 2019-10-21 PROCEDURE — 99214 OFFICE O/P EST MOD 30 MIN: CPT

## 2019-10-21 PROCEDURE — 36415 COLL VENOUS BLD VENIPUNCTURE: CPT

## 2019-10-21 NOTE — HISTORY OF PRESENT ILLNESS
[No Pertinent Cardiac History] : no history of aortic stenosis, atrial fibrillation, coronary artery disease, recent myocardial infarction, or implantable device/pacemaker [FreeTextEntry1] : CEA [FreeTextEntry4] : \par 69 yo f with HTN, PVD with plan for CEA.

## 2019-10-21 NOTE — PHYSICAL EXAM
[Normal] : affect was normal and insight and judgment were intact [No Edema] : there was no peripheral edema

## 2019-10-22 LAB
ALBUMIN SERPL ELPH-MCNC: 5.1 G/DL
ALP BLD-CCNC: 82 U/L
ALT SERPL-CCNC: 27 U/L
ANION GAP SERPL CALC-SCNC: 17 MMOL/L
APTT BLD: 31.5 SEC
AST SERPL-CCNC: 24 U/L
BASOPHILS # BLD AUTO: 0 K/UL
BASOPHILS NFR BLD AUTO: 0 %
BILIRUB SERPL-MCNC: 0.6 MG/DL
BUN SERPL-MCNC: 19 MG/DL
CALCIUM SERPL-MCNC: 10.4 MG/DL
CHLORIDE SERPL-SCNC: 100 MMOL/L
CHOLEST SERPL-MCNC: 230 MG/DL
CHOLEST/HDLC SERPL: 1.6 RATIO
CO2 SERPL-SCNC: 23 MMOL/L
CREAT SERPL-MCNC: 0.85 MG/DL
EOSINOPHIL # BLD AUTO: 0.18 K/UL
EOSINOPHIL NFR BLD AUTO: 2.7 %
GLUCOSE SERPL-MCNC: 109 MG/DL
HCT VFR BLD CALC: 44 %
HDLC SERPL-MCNC: 142 MG/DL
HGB BLD-MCNC: 13.9 G/DL
INR PPP: 0.9 RATIO
LDLC SERPL CALC-MCNC: 76 MG/DL
LYMPHOCYTES # BLD AUTO: 1.55 K/UL
LYMPHOCYTES NFR BLD AUTO: 22.7 %
MAN DIFF?: NORMAL
MCHC RBC-ENTMCNC: 31.6 GM/DL
MCHC RBC-ENTMCNC: 33.4 PG
MCV RBC AUTO: 105.8 FL
MONOCYTES # BLD AUTO: 0.94 K/UL
MONOCYTES NFR BLD AUTO: 13.7 %
NEUTROPHILS # BLD AUTO: 3.98 K/UL
NEUTROPHILS NFR BLD AUTO: 58.2 %
PLATELET # BLD AUTO: 297 K/UL
POTASSIUM SERPL-SCNC: 4.4 MMOL/L
PROT SERPL-MCNC: 7.5 G/DL
PT BLD: 10.2 SEC
RBC # BLD: 4.16 M/UL
RBC # FLD: 14.4 %
SODIUM SERPL-SCNC: 140 MMOL/L
TRIGL SERPL-MCNC: 58 MG/DL
WBC # FLD AUTO: 6.84 K/UL

## 2019-10-23 VITALS
SYSTOLIC BLOOD PRESSURE: 143 MMHG | TEMPERATURE: 98 F | OXYGEN SATURATION: 97 % | DIASTOLIC BLOOD PRESSURE: 74 MMHG | HEIGHT: 69 IN | WEIGHT: 122.8 LBS | RESPIRATION RATE: 16 BRPM

## 2019-10-23 RX ORDER — DIPHENHYDRAMINE HCL 50 MG
0 CAPSULE ORAL
Qty: 0 | Refills: 0 | DISCHARGE

## 2019-10-23 RX ORDER — ATORVASTATIN CALCIUM 80 MG/1
1 TABLET, FILM COATED ORAL
Qty: 0 | Refills: 0 | DISCHARGE

## 2019-10-24 ENCOUNTER — RESULT REVIEW (OUTPATIENT)
Age: 69
End: 2019-10-24

## 2019-10-24 ENCOUNTER — APPOINTMENT (OUTPATIENT)
Dept: VASCULAR SURGERY | Facility: HOSPITAL | Age: 69
End: 2019-10-24

## 2019-10-24 ENCOUNTER — INPATIENT (INPATIENT)
Facility: HOSPITAL | Age: 69
LOS: 0 days | Discharge: HOME | DRG: 38 | End: 2019-10-25
Attending: SURGERY | Admitting: SURGERY
Payer: COMMERCIAL

## 2019-10-24 DIAGNOSIS — I65.29 OCCLUSION AND STENOSIS OF UNSPECIFIED CAROTID ARTERY: ICD-10-CM

## 2019-10-24 DIAGNOSIS — Z41.9 ENCOUNTER FOR PROCEDURE FOR PURPOSES OTHER THAN REMEDYING HEALTH STATE, UNSPECIFIED: Chronic | ICD-10-CM

## 2019-10-24 DIAGNOSIS — Z87.59 PERSONAL HISTORY OF OTHER COMPLICATIONS OF PREGNANCY, CHILDBIRTH AND THE PUERPERIUM: Chronic | ICD-10-CM

## 2019-10-24 DIAGNOSIS — Z90.89 ACQUIRED ABSENCE OF OTHER ORGANS: Chronic | ICD-10-CM

## 2019-10-24 LAB
ANION GAP SERPL CALC-SCNC: 10 MMOL/L — SIGNIFICANT CHANGE UP (ref 5–17)
APTT BLD: 143.4 SEC — CRITICAL HIGH (ref 27.5–36.3)
BUN SERPL-MCNC: 16 MG/DL — SIGNIFICANT CHANGE UP (ref 7–23)
CALCIUM SERPL-MCNC: 9.4 MG/DL — SIGNIFICANT CHANGE UP (ref 8.4–10.5)
CHLORIDE SERPL-SCNC: 101 MMOL/L — SIGNIFICANT CHANGE UP (ref 96–108)
CO2 SERPL-SCNC: 24 MMOL/L — SIGNIFICANT CHANGE UP (ref 22–31)
CREAT SERPL-MCNC: 0.9 MG/DL — SIGNIFICANT CHANGE UP (ref 0.5–1.3)
GLUCOSE SERPL-MCNC: 147 MG/DL — HIGH (ref 70–99)
HCT VFR BLD CALC: 35.6 % — SIGNIFICANT CHANGE UP (ref 34.5–45)
HGB BLD-MCNC: 12 G/DL — SIGNIFICANT CHANGE UP (ref 11.5–15.5)
INR BLD: 1.07 — SIGNIFICANT CHANGE UP (ref 0.88–1.16)
MAGNESIUM SERPL-MCNC: 1.8 MG/DL — SIGNIFICANT CHANGE UP (ref 1.6–2.6)
MCHC RBC-ENTMCNC: 33.1 PG — SIGNIFICANT CHANGE UP (ref 27–34)
MCHC RBC-ENTMCNC: 33.7 GM/DL — SIGNIFICANT CHANGE UP (ref 32–36)
MCV RBC AUTO: 98.1 FL — SIGNIFICANT CHANGE UP (ref 80–100)
NRBC # BLD: 0 /100 WBCS — SIGNIFICANT CHANGE UP (ref 0–0)
PHOSPHATE SERPL-MCNC: 3.2 MG/DL — SIGNIFICANT CHANGE UP (ref 2.5–4.5)
PLATELET # BLD AUTO: 248 K/UL — SIGNIFICANT CHANGE UP (ref 150–400)
POTASSIUM SERPL-MCNC: 4.7 MMOL/L — SIGNIFICANT CHANGE UP (ref 3.5–5.3)
POTASSIUM SERPL-SCNC: 4.7 MMOL/L — SIGNIFICANT CHANGE UP (ref 3.5–5.3)
PROTHROM AB SERPL-ACNC: 12.1 SEC — SIGNIFICANT CHANGE UP (ref 10–12.9)
RBC # BLD: 3.63 M/UL — LOW (ref 3.8–5.2)
RBC # FLD: 13.2 % — SIGNIFICANT CHANGE UP (ref 10.3–14.5)
SODIUM SERPL-SCNC: 135 MMOL/L — SIGNIFICANT CHANGE UP (ref 135–145)
WBC # BLD: 5.61 K/UL — SIGNIFICANT CHANGE UP (ref 3.8–10.5)
WBC # FLD AUTO: 5.61 K/UL — SIGNIFICANT CHANGE UP (ref 3.8–10.5)

## 2019-10-24 PROCEDURE — 35301 RECHANNELING OF ARTERY: CPT | Mod: GC

## 2019-10-24 RX ORDER — SODIUM CHLORIDE 9 MG/ML
1000 INJECTION, SOLUTION INTRAVENOUS
Refills: 0 | Status: DISCONTINUED | OUTPATIENT
Start: 2019-10-24 | End: 2019-10-25

## 2019-10-24 RX ORDER — ONDANSETRON 8 MG/1
4 TABLET, FILM COATED ORAL EVERY 6 HOURS
Refills: 0 | Status: DISCONTINUED | OUTPATIENT
Start: 2019-10-24 | End: 2019-10-25

## 2019-10-24 RX ORDER — ACETAMINOPHEN 500 MG
1000 TABLET ORAL ONCE
Refills: 0 | Status: COMPLETED | OUTPATIENT
Start: 2019-10-24 | End: 2019-10-24

## 2019-10-24 RX ORDER — LORATADINE 10 MG/1
10 TABLET ORAL DAILY
Refills: 0 | Status: DISCONTINUED | OUTPATIENT
Start: 2019-10-24 | End: 2019-10-25

## 2019-10-24 RX ORDER — LATANOPROST 0.05 MG/ML
1 SOLUTION/ DROPS OPHTHALMIC; TOPICAL AT BEDTIME
Refills: 0 | Status: DISCONTINUED | OUTPATIENT
Start: 2019-10-24 | End: 2019-10-25

## 2019-10-24 RX ORDER — ACETAMINOPHEN 500 MG
1000 TABLET ORAL ONCE
Refills: 0 | Status: DISCONTINUED | OUTPATIENT
Start: 2019-10-24 | End: 2019-10-24

## 2019-10-24 RX ORDER — ACETAMINOPHEN 500 MG
650 TABLET ORAL EVERY 6 HOURS
Refills: 0 | Status: DISCONTINUED | OUTPATIENT
Start: 2019-10-24 | End: 2019-10-25

## 2019-10-24 RX ORDER — CILOSTAZOL 100 MG/1
50 TABLET ORAL
Refills: 0 | Status: DISCONTINUED | OUTPATIENT
Start: 2019-10-24 | End: 2019-10-25

## 2019-10-24 RX ORDER — NICARDIPINE HYDROCHLORIDE 30 MG/1
2.5 CAPSULE, EXTENDED RELEASE ORAL
Qty: 40 | Refills: 0 | Status: DISCONTINUED | OUTPATIENT
Start: 2019-10-24 | End: 2019-10-24

## 2019-10-24 RX ORDER — ATORVASTATIN CALCIUM 80 MG/1
40 TABLET, FILM COATED ORAL AT BEDTIME
Refills: 0 | Status: DISCONTINUED | OUTPATIENT
Start: 2019-10-24 | End: 2019-10-25

## 2019-10-24 RX ORDER — ATORVASTATIN CALCIUM 80 MG/1
40 TABLET, FILM COATED ORAL DAILY
Refills: 0 | Status: DISCONTINUED | OUTPATIENT
Start: 2019-10-24 | End: 2019-10-24

## 2019-10-24 RX ORDER — LOSARTAN POTASSIUM 100 MG/1
50 TABLET, FILM COATED ORAL DAILY
Refills: 0 | Status: DISCONTINUED | OUTPATIENT
Start: 2019-10-24 | End: 2019-10-25

## 2019-10-24 RX ADMIN — LATANOPROST 1 DROP(S): 0.05 SOLUTION/ DROPS OPHTHALMIC; TOPICAL at 22:12

## 2019-10-24 RX ADMIN — CILOSTAZOL 50 MILLIGRAM(S): 100 TABLET ORAL at 17:38

## 2019-10-24 RX ADMIN — Medication 1000 MILLIGRAM(S): at 14:58

## 2019-10-24 RX ADMIN — Medication 650 MILLIGRAM(S): at 21:00

## 2019-10-24 RX ADMIN — Medication 1000 MILLIGRAM(S): at 16:00

## 2019-10-24 RX ADMIN — ATORVASTATIN CALCIUM 40 MILLIGRAM(S): 80 TABLET, FILM COATED ORAL at 21:00

## 2019-10-24 RX ADMIN — Medication 650 MILLIGRAM(S): at 21:45

## 2019-10-24 NOTE — H&P ADULT - HISTORY OF PRESENT ILLNESS
68y female with PMH of HTN, PVD with no significant cardiac history is here for elective left carotid endarterectomy for asymptomatic stenosis >70%.   Left /EDV 77    Preop Hgb 13.9  Cr 0.85    PMH: as noted  Medications:   atorvastatin 40 daily  cilostazol 50 BID  lumigan  valsartan 160mg daily     Previous surgical hx: 68y female with PMH of HTN, PVD with no significant cardiac history is here for elective left carotid endarterectomy for asymptomatic stenosis >70%.   Left /EDV 77    Preop Hgb 13.9  Cr 0.85    PMH: as noted  Medications:   atorvastatin 40 daily  cilostazol 50 BID  lumigan  valsartan 160mg daily     Previous surgical hx: no previous surgical hx    Allergies: PENICILLIN, CONTRAST    PHYSICAL EXAM:  GENERAL:  NAD  NEURO: AOx3. No focal deficit.   HEENT: AT, NC, sclera anicteric, mucosa moist  CV: RRR, no MRG  RESP: CTAB, no wheezes/rales/rhonchi   GI: soft, ND, NT  : voiding  MUSC: strength, sensation grossly intact.   Ext: No cyanosis, edema noted. Pulses 2+ bilateral UE/LE.       A/P: 68y female here for elective left carotid endarterectomy, asymptomatic.    Admit to [facility], telemetry, Vascular surgery    Neuro checks q30 x2 hours while in PACU; s9iwpat when on the floor   -160  IVF @ 125cc/hr  Pain control, nausea control  NO SCDs  AM labs  continue home medications

## 2019-10-24 NOTE — H&P ADULT - NSICDXPASTSURGICALHX_GEN_ALL_CORE_FT
PAST SURGICAL HISTORY:  S/P ectopic pregnancy     S/P tonsillectomy     Surgery, elective melanoma removal, back and leg    Surgery, elective cyst removal left breast

## 2019-10-24 NOTE — BRIEF OPERATIVE NOTE - OPERATION/FINDINGS
Procedure: left carotid endarterectomy with patch angioplasty, EEG neuromonitoring   Indication: asymptomatic left carotid stenosis >70%  - patient intubated with EEG monitoring, prepped and draped in beach chair position   - carotid artery was dissected and identified via vertical incision on medial aspect of SCM with ligation of the external jugular vein and facial vein  - hypoglossal nerve identified and preserved  - common carotid, external carotid, internal carotid then carotid bulg (in that order) were dissected - heparin was administered - vascular control was obtained on internal carotid, external carotid and common carotid (in that order).   - artereotomy of the carotid bulb and extraction of the carotid plaque was performed maintaining integrity of the adventicia - patch angioplasty performed with running 6-0 prolene and a Photofix patch.   - hemostasis was achieved.   - skin was closed via 3 layers, with 3-0 vicryl, 3-0 vicryl and then skin was reapproximated with 4-0 monocryl.     EBL: 50  IVF: 900  UOP: na  Max HR92, MIN 78  MAX SBP: 190   Patient tolerated procedure well, no focal neuro deficits at conclusion of the case, AOx3

## 2019-10-24 NOTE — H&P ADULT - NSICDXPASTMEDICALHX_GEN_ALL_CORE_FT
PAST MEDICAL HISTORY:  Asthma     Carotid artery stenosis     HLD (hyperlipidemia)     HTN (hypertension)     Osteoporosis     UTI (urinary tract infection)

## 2019-10-24 NOTE — PROVIDER CONTACT NOTE (OTHER) - ASSESSMENT
PT hemodynamically [id number]..  B/P 146/79  HR 75  As per Dr. Santiago pt stable to go to [facility]  Dr. Lopez aware of above
done

## 2019-10-24 NOTE — PROGRESS NOTE ADULT - SUBJECTIVE AND OBJECTIVE BOX
POST-OPERATIVE NOTE    Procedure: Left Carotid Endarterectomy    Diagnosis/Indication: Carotid Artery Stenosis    Surgeon: Jessica BARBER: Pt has no complaints. Denies headache, difficulty phonating, abnormal facial sensation, or facial anesthesia. Also denies CP, SOB, N/V. Pain controlled with medication.    O:  T(C): 36.3 (10-24-19 @ 09:54), Max: 36.3 (10-24-19 @ 09:54)  T(F): 97.4 (10-24-19 @ 09:54), Max: 97.4 (10-24-19 @ 09:54)  HR: 76 (10-24-19 @ 13:30) (68 - 84)  BP: 152/67 (10-24-19 @ 13:30) (146/79 - 160/77)  RR: 18 (10-24-19 @ 13:30) (9 - 31)  SpO2: 100% (10-24-19 @ 13:30) (96% - 100%)  Wt(kg): --                        12.0   5.61  )-----------( 248      ( 24 Oct 2019 10:16 )             35.6     10-24    135  |  101  |  16  ----------------------------<  147<H>  4.7   |  24  |  0.90    Ca    9.4      24 Oct 2019 10:16  Phos  3.2     10-24  Mg     1.8     10-24        Gen: NAD,   HEENT: NCAT, neck is soft and supple, no hematoma, tracheal landmarks midline and in place. Left neck incision is clean, dry, without any oozing. Face is midline & symmetric with full range of facial expression; CN7 grossly intact. Sensation across trigeminal nerve distributions in tact bilaterally and same on both sides. Palate raise is symmetric, uvula is midline, tongue with full ROM. Left side spinal accessory unable to be assessed 2/2 incisional pain but minimal otherwise  C/V: NSR  Pulm: Nonlabored breathing, no respiratory distress  Abd: soft, NT/ND  Extrem: WWP, no calf edema or tenderness

## 2019-10-24 NOTE — PROGRESS NOTE ADULT - ASSESSMENT
68F s/p left carotid endarterectomy for ZENAIDA POD0    Diet: clear liquids - advance as tolerated  IVF: LR @ 125 - heplock when tolerating PO  Pain/nausea control - no narcotics  Home meds  BP control <160  Monitor AGUILA  Post-op labs & EKG  Likely home tomorrow

## 2019-10-25 ENCOUNTER — TRANSCRIPTION ENCOUNTER (OUTPATIENT)
Age: 69
End: 2019-10-25

## 2019-10-25 VITALS — WEIGHT: 122.8 LBS

## 2019-10-25 LAB
ANION GAP SERPL CALC-SCNC: 11 MMOL/L — SIGNIFICANT CHANGE UP (ref 5–17)
BASOPHILS # BLD AUTO: 0.02 K/UL — SIGNIFICANT CHANGE UP (ref 0–0.2)
BASOPHILS NFR BLD AUTO: 0.2 % — SIGNIFICANT CHANGE UP (ref 0–2)
BUN SERPL-MCNC: 12 MG/DL — SIGNIFICANT CHANGE UP (ref 7–23)
CALCIUM SERPL-MCNC: 9.1 MG/DL — SIGNIFICANT CHANGE UP (ref 8.4–10.5)
CHLORIDE SERPL-SCNC: 98 MMOL/L — SIGNIFICANT CHANGE UP (ref 96–108)
CO2 SERPL-SCNC: 25 MMOL/L — SIGNIFICANT CHANGE UP (ref 22–31)
CREAT SERPL-MCNC: 0.95 MG/DL — SIGNIFICANT CHANGE UP (ref 0.5–1.3)
EOSINOPHIL # BLD AUTO: 0.05 K/UL — SIGNIFICANT CHANGE UP (ref 0–0.5)
EOSINOPHIL NFR BLD AUTO: 0.6 % — SIGNIFICANT CHANGE UP (ref 0–6)
GLUCOSE SERPL-MCNC: 101 MG/DL — HIGH (ref 70–99)
HBA1C BLD-MCNC: 5.2 % — SIGNIFICANT CHANGE UP (ref 4–5.6)
HCT VFR BLD CALC: 34.8 % — SIGNIFICANT CHANGE UP (ref 34.5–45)
HCV AB S/CO SERPL IA: 0.25 S/CO — SIGNIFICANT CHANGE UP
HCV AB SERPL-IMP: SIGNIFICANT CHANGE UP
HGB BLD-MCNC: 11.2 G/DL — LOW (ref 11.5–15.5)
IMM GRANULOCYTES NFR BLD AUTO: 0.2 % — SIGNIFICANT CHANGE UP (ref 0–1.5)
LYMPHOCYTES # BLD AUTO: 1.68 K/UL — SIGNIFICANT CHANGE UP (ref 1–3.3)
LYMPHOCYTES # BLD AUTO: 20.9 % — SIGNIFICANT CHANGE UP (ref 13–44)
MAGNESIUM SERPL-MCNC: 1.8 MG/DL — SIGNIFICANT CHANGE UP (ref 1.6–2.6)
MCHC RBC-ENTMCNC: 32.2 GM/DL — SIGNIFICANT CHANGE UP (ref 32–36)
MCHC RBC-ENTMCNC: 32.9 PG — SIGNIFICANT CHANGE UP (ref 27–34)
MCV RBC AUTO: 102.4 FL — HIGH (ref 80–100)
MONOCYTES # BLD AUTO: 0.86 K/UL — SIGNIFICANT CHANGE UP (ref 0–0.9)
MONOCYTES NFR BLD AUTO: 10.7 % — SIGNIFICANT CHANGE UP (ref 2–14)
NEUTROPHILS # BLD AUTO: 5.41 K/UL — SIGNIFICANT CHANGE UP (ref 1.8–7.4)
NEUTROPHILS NFR BLD AUTO: 67.4 % — SIGNIFICANT CHANGE UP (ref 43–77)
NRBC # BLD: 0 /100 WBCS — SIGNIFICANT CHANGE UP (ref 0–0)
PHOSPHATE SERPL-MCNC: 3.7 MG/DL — SIGNIFICANT CHANGE UP (ref 2.5–4.5)
PLATELET # BLD AUTO: 256 K/UL — SIGNIFICANT CHANGE UP (ref 150–400)
POTASSIUM SERPL-MCNC: 4.4 MMOL/L — SIGNIFICANT CHANGE UP (ref 3.5–5.3)
POTASSIUM SERPL-SCNC: 4.4 MMOL/L — SIGNIFICANT CHANGE UP (ref 3.5–5.3)
RBC # BLD: 3.4 M/UL — LOW (ref 3.8–5.2)
RBC # FLD: 13.4 % — SIGNIFICANT CHANGE UP (ref 10.3–14.5)
SODIUM SERPL-SCNC: 134 MMOL/L — LOW (ref 135–145)
WBC # BLD: 8.04 K/UL — SIGNIFICANT CHANGE UP (ref 3.8–10.5)
WBC # FLD AUTO: 8.04 K/UL — SIGNIFICANT CHANGE UP (ref 3.8–10.5)

## 2019-10-25 PROCEDURE — 99254 IP/OBS CNSLTJ NEW/EST MOD 60: CPT | Mod: GC

## 2019-10-25 RX ORDER — LANOLIN ALCOHOL/MO/W.PET/CERES
10 CREAM (GRAM) TOPICAL AT BEDTIME
Refills: 0 | Status: DISCONTINUED | OUTPATIENT
Start: 2019-10-25 | End: 2019-10-25

## 2019-10-25 RX ORDER — LANOLIN ALCOHOL/MO/W.PET/CERES
2 CREAM (GRAM) TOPICAL
Qty: 0 | Refills: 0 | DISCHARGE
Start: 2019-10-25

## 2019-10-25 RX ADMIN — CILOSTAZOL 50 MILLIGRAM(S): 100 TABLET ORAL at 05:48

## 2019-10-25 RX ADMIN — Medication 650 MILLIGRAM(S): at 05:48

## 2019-10-25 RX ADMIN — Medication 650 MILLIGRAM(S): at 11:44

## 2019-10-25 RX ADMIN — Medication 10 MILLIGRAM(S): at 00:57

## 2019-10-25 RX ADMIN — Medication 650 MILLIGRAM(S): at 06:45

## 2019-10-25 NOTE — DISCHARGE NOTE PROVIDER - NSDCFUADDINST_GEN_ALL_CORE_FT
No vigorous activity for 2 weeks.   Please return to the ED or call the office urgently if you experience any stroke-like symptoms of sudden weakess, numbness, changes in vision, confusion, severe headache or difficulty with speech.

## 2019-10-25 NOTE — DIETITIAN INITIAL EVALUATION ADULT. - ENERGY NEEDS
Height: 69" Weight: 123lbs, IBW 145lbs+/-10%, %IBW 85%, BMI 18.2kg/m2 ,   ABW used for calculations as pt between % of IBW.   Nutrient needs based on [facility] standards of care for maintenance in older adults / post-op. Height: 69" Weight: 123lbs, IBW 145lbs+/-10%, %IBW 85%, BMI 18.2kg/m2 ,   ABW used for calculations as pt between % of IBW.   Nutrient needs based on [facility] standards of care for maintenance in older adults / post-op needs

## 2019-10-25 NOTE — DISCHARGE NOTE PROVIDER - NSDCCPTREATMENT_GEN_ALL_CORE_FT
PRINCIPAL PROCEDURE  Procedure: Left carotid endarterectomy  Findings and Treatment: patient tolerated procedure well, incision CDI with no neuro deficits on exam. AGUILA drain was removed Friday AM.

## 2019-10-25 NOTE — DIETITIAN INITIAL EVALUATION ADULT. - OTHER INFO
68y female with PMH of HTN, PVD with no significant cardiac history is here for elective left carotid endarterectomy for asymptomatic stenosis >70% on 10/24.     Upon assessment, pt resting in bed with  by bedside. PTA reports good appetite this AM, eating ~% of scrambled eggs and muffin on breakfast tray. Pt reports neck pain currently. PTA, reports good appetite. Pt suspected 5 lb wt loss over last few months 2/2 stress of condition and increased activity, however per chart wt is consistent with UBW. No apparent muscle or fat wasting. Confirms shellfish allergy, denies dietary restrictions. No chewing or swallowing difficulty. Pt denies n/v/c/d. GI: last BM 10/24. Skin: rodolfo score 20, surgical incision. Plan to d/c later today. 68y female with PMH of HTN, PVD with no significant cardiac history admitted for elective left carotid endarterectomy for asymptomatic stenosis >70% on 10/24, now POD1.     Upon assessment, pt resting in bed with  by bedside. PTA reports good appetite. This AM, eating ~% of scrambled eggs and muffin on breakfast tray. Pt reports neck pain currently however feeling overall improved + less anxious now that surgery is completed. PTA, reports good appetite. Pt suspected 5 lb wt loss over last few months 2/2 stress of condition and increased activity, however per chart wt is consistent with UBW of 56kg. No apparent muscle or fat wasting. Confirms shellfish allergy, denies dietary restrictions. No chewing or swallowing difficulty. Pt denies n/v/c/d. GI: last BM 10/24. Skin: rodolfo score 20, surgical incision. Plan to d/c later today to home. Encouraged intake through day to meet needs.

## 2019-10-25 NOTE — CHART NOTE - NSCHARTNOTEFT_GEN_A_CORE
Upon Nutritional Assessment by the Registered Dietitian your patient was determined to meet criteria / has evidence of the following diagnosis/diagnoses:          [ ]  Mild Protein Calorie Malnutrition        [ ]  Moderate Protein Calorie Malnutrition        [ ] Severe Protein Calorie Malnutrition        [ ] Unspecified Protein Calorie Malnutrition        [x ] Underweight / BMI <19        [ ] Morbid Obesity / BMI > 40      Findings as based on:  •  Comprehensive nutrition assessment and consultation      Treatment:    The following diet has been recommended:    C/w regular diet       PROVIDER Section:     By signing this assessment you are acknowledging and agree with the diagnosis/diagnoses assigned by the Registered Dietitian    Comments:

## 2019-10-25 NOTE — DISCHARGE NOTE PROVIDER - CARE PROVIDER_API CALL
May Lopez)  Surgery; Vascular Surgery  [address], [address] [place] [address]  Phone: [phone]  Fax: [phone]  Follow Up Time:

## 2019-10-25 NOTE — DIETITIAN INITIAL EVALUATION ADULT. - PHYSICAL APPEARANCE
underweight small frame, wt noted to be stable, tolerating PO at this time with no s/sx of fat/ muscle wasting/well nourished/underweight

## 2019-10-25 NOTE — DIETITIAN INITIAL EVALUATION ADULT. - ADD RECOMMEND
1. Encourage intake throughout the day 2. Monitor labs 3. Trend wts 1. Encourage intake throughout the day 2. Monitor and replete lytes 3. Trend wts 4. If PO intake <50% recommend Ensure Enlive TID (1050 kcal, 60g protein, 540mL free H2O) to best meet needs

## 2019-10-25 NOTE — DIETITIAN INITIAL EVALUATION ADULT. - MALNUTRITION
Would not suspect malnutrition at this time. Wt noted to be stable, tolerating PO (>75% EER being met >1 month) at this time with no s/sx of fat/ muscle wasting or losses

## 2019-10-25 NOTE — CONSULT NOTE ADULT - SUBJECTIVE AND OBJECTIVE BOX
Patient is a 68y old  Female who presents with a chief complaint of Elective left carotid endarterectomy (25 Oct 2019 09:07)  pt has no complaints  no headache  no dizziness or lightheadedness       HPI:  68y female with PMH of HTN, PVD with no significant cardiac history is here for elective left carotid endarterectomy for asymptomatic stenosis >70%.   Left /EDV 77    Preop Hgb 13.9  Cr 0.85    PMH: as noted  Medications:   atorvastatin 40 daily  cilostazol 50 BID  lumigan  valsartan 160mg daily     Previous surgical hx: no previous surgical hx    Allergies: PENICILLIN, CONTRAST    PHYSICAL EXAM:  GENERAL:  NAD  NEURO: AOx3. No focal deficit.   HEENT: AT, NC, sclera anicteric, mucosa moist  CV: RRR, no MRG  RESP: CTAB, no wheezes/rales/rhonchi   GI: soft, ND, NT  : voiding  MUSC: strength, sensation grossly intact.   Ext: No cyanosis, edema noted. Pulses 2+ bilateral UE/LE.       A/P: 68y female here for elective left carotid endarterectomy, asymptomatic.    Admit to [facility], telemetry, Vascular surgery    Neuro checks q30 x2 hours while in PACU; i6sdatb when on the floor   -160  IVF @ 125cc/hr  Pain control, nausea control  NO SCDs  AM labs  continue home medications (24 Oct 2019 10:05)      REVIEW OF SYSTEMS      General:	    Skin/Breast:  	  Ophthalmologic:  	  ENMT:	    Respiratory and Thorax:  	  Cardiovascular:	    Gastrointestinal:	    Genitourinary:	    Musculoskeletal:	    Neurological:	    Psychiatric:	    Hematology/Lymphatics:	    Endocrine:	    Allergic/Immunologic:	    Allergies    IV contrast dyes, Shellfish (Rash)  penicillin (Rash)    Intolerances        Home Medications:  atorvastatin 40 mg oral tablet: 1 tab(s) orally once a day (24 Oct 2019 10:23)  cilostazol 50 mg oral tablet: 1 tab(s) orally 2 times a day (24 Oct 2019 10:23)  desloratadine 5 mg oral tablet: 1 tab(s) orally once a day (24 Oct 2019 10:23)  Lumigan 0.01% ophthalmic solution: 1 drop(s) to each affected eye once a day (in the evening) (24 Oct 2019 10:23)  melatonin 5 mg oral tablet: 2 tab(s) orally once a day (at bedtime) (25 Oct 2019 09:20)  valsartan 160 mg oral tablet: 1 tab(s) orally once a day (24 Oct 2019 10:23)      PAST MEDICAL & SURGICAL HISTORY:  HLD (hyperlipidemia)  HTN (hypertension)  UTI (urinary tract infection)  Osteoporosis  Asthma  Carotid artery stenosis  Surgery, elective: cyst removal left breast  Surgery, elective: melanoma removal, back and leg  S/P tonsillectomy  S/P ectopic pregnancy      FAMILY HISTORY:      SOCIAL HISTORY:      Vital Signs Last 24 Hrs  T(C): 36.6 (25 Oct 2019 09:48), Max: 36.8 (25 Oct 2019 06:47)  T(F): 97.9 (25 Oct 2019 09:48), Max: 98.2 (25 Oct 2019 06:47)  HR: 70 (25 Oct 2019 08:30) (69 - 77)  BP: 119/65 (25 Oct 2019 08:30) (116/59 - 141/69)  BP(mean): --  RR: 19 (25 Oct 2019 08:30) (18 - 19)  SpO2: 97% (25 Oct 2019 05:45) (96% - 100%)   I&O's Detail    24 Oct 2019 07:01  -  25 Oct 2019 07:00  --------------------------------------------------------  IN:    lactated ringers.: 750 mL  Total IN: 750 mL    OUT:    Bulb: 65 mL    Voided: 550 mL  Total OUT: 615 mL    Total NET: 135 mL      25 Oct 2019 07:01  -  25 Oct 2019 14:13  --------------------------------------------------------  IN:    Oral Fluid: 280 mL  Total IN: 280 mL    OUT:  Total OUT: 0 mL    Total NET: 280 mL        Daily     Daily Weight in k.7 (25 Oct 2019 10:52)    Physical Exam:   GEN: NAD, AAOx3  HEENT: MMM, no icterus  CV: S1 S2 RRR, no MRG  Lung: CTAB  Abd: soft NT ND +BS  Ext: no c/c/e  Neuro: no focal neuro deficit    MEDICATIONS  (STANDING):  atorvastatin 40 milliGRAM(s) Oral at bedtime  cilostazol 50 milliGRAM(s) Oral two times a day  latanoprost 0.005% Ophthalmic Solution 1 Drop(s) Both EYES at bedtime  loratadine 10 milliGRAM(s) Oral daily  losartan 50 milliGRAM(s) Oral daily  melatonin 10 milliGRAM(s) Oral at bedtime    MEDICATIONS  (PRN):  acetaminophen   Tablet .. 650 milliGRAM(s) Oral every 6 hours PRN Moderate Pain (4 - 6)  ondansetron Injectable 4 milliGRAM(s) IV Push every 6 hours PRN Nausea                LABS:                        11.2   8.04  )-----------( 256      ( 25 Oct 2019 06:59 )             34.8     10-25    134<L>  |  98  |  12  ----------------------------<  101<H>  4.4   |  25  |  0.95    Ca    9.1      25 Oct 2019 06:59  Phos  3.7     10-25  Mg     1.8     10-25          PT/INR - ( 24 Oct 2019 10:16 )   PT: 12.1 sec;   INR: 1.07          PTT - ( 24 Oct 2019 10:16 )  PTT:143.4 sec  CAPILLARY BLOOD GLUCOSE          RADIOLOGY & ADDITIONAL STUDIES:

## 2019-10-25 NOTE — DISCHARGE NOTE NURSING/CASE MANAGEMENT/SOCIAL WORK - PATIENT PORTAL LINK FT
You can access the FollowMyHealth Patient Portal offered by [facility] by registering at the following website: [email or web address]. By joining [employer]’s FollowMyHealth portal, you will also be able to view your health information using other applications (apps) compatible with our system.

## 2019-10-25 NOTE — DISCHARGE NOTE PROVIDER - NSDCCPCAREPLAN_GEN_ALL_CORE_FT
PRINCIPAL DISCHARGE DIAGNOSIS  Diagnosis: Carotid artery stenosis  Assessment and Plan of Treatment: s/p left carotid endarterectomy. Patient will follow up in the office in a week with Dr. Lopez. Please call the office for an appointment [phone]  She will also follow up regularly with Dr. Mendoza re - moderate Right sided carotid stenosis.      SECONDARY DISCHARGE DIAGNOSES  Diagnosis: PVD (peripheral vascular disease)  Assessment and Plan of Treatment: follow up with Dr. Lopez as needed

## 2019-10-25 NOTE — DISCHARGE NOTE PROVIDER - HOSPITAL COURSE
68y female with PMH of HTN, PVD with no significant cardiac history is here for elective left carotid endarterectomy for asymptomatic stenosis >70%.         10/24: L Carotid Endarterectomy performed. Patient tolerated the procedure well - she was fully neuro intact post procedure with a AGUILA drain in the incision. Overnight she was able to ambulate, tolerate CLD, and her pain was controlled. This morning the patient was seen and examined on rounds - she had no neuro deficits on exam, she was doing well overall still ambulating and tolerating PO. She will be discharged today with no antibiotics and no narcotics (as was requested by the patient, she refused narcotics).

## 2019-10-25 NOTE — CONSULT NOTE ADULT - ATTENDING COMMENTS
68y female with PMH of HTN, PVD 68y female with PMH of HTN, PVD    1- Carotid stenosis- s/p CEA  2- HTN- resume home meds

## 2019-10-25 NOTE — DISCHARGE NOTE PROVIDER - NSDCACTIVITY_GEN_ALL_CORE
No restrictions/Bathing allowed/Showering allowed/Walking - Indoors allowed/Walking - Outdoors allowed

## 2019-10-25 NOTE — PROGRESS NOTE ADULT - SUBJECTIVE AND OBJECTIVE BOX
O/N: CHARLENE MONTES JP 10cc                              68F s/p left carotid endarterectomy for ZENAIDA POD0    Diet: clear liquids - advance as tolerated  Pain/nausea control - no narcotics  Home meds  BP control <160  Monitor AGUILA  Likely home today O/N: SIMON, VSS AGUILA 10cc    Patient was seen and examined at bedside. No acute event overnight. No complaints.      Vital Signs Last 24 Hrs  T(C): 36.6 (25 Oct 2019 09:48), Max: 36.8 (25 Oct 2019 06:47)  T(F): 97.9 (25 Oct 2019 09:48), Max: 98.2 (25 Oct 2019 06:47)  HR: 70 (25 Oct 2019 08:30) (68 - 77)  BP: 119/65 (25 Oct 2019 08:30) (116/59 - 157/89)  BP(mean): 100 (24 Oct 2019 12:30) (97 - 116)  RR: 19 (25 Oct 2019 08:30) (15 - 31)  SpO2: 97% (25 Oct 2019 05:45) (95% - 100%)    Physical Exam:  General: NAD  HEENT: no nck hematoma on L side of the neck, incision with no signs of infection, no edema. Good closure no bleeding. AGUILA drain in lateral chest. Drain removed - no hemorrhage, no drainage.   Pulmonary: Nonlabored breathing, no respiratory distress  Cardiovascular: NSR  Abdominal: soft, NT/ND, no organomegaly  Extremities: WWP, normal strength, no clubbing/cyanosis/edema  Neuro: A/O x3, no focal deficits, normal sensation, minor facial droop on the right side. No concerns for permanent neuro symptomatology  Pulses: palpable distal pulses    Lines/drains/tubes:    I&O's Summary    24 Oct 2019 07:01  -  25 Oct 2019 07:00  --------------------------------------------------------  IN: 750 mL / OUT: 615 mL / NET: 135 mL    25 Oct 2019 07:01  -  25 Oct 2019 11:03  --------------------------------------------------------  IN: 280 mL / OUT: 0 mL / NET: 280 mL        LABS:                        11.2   8.04  )-----------( 256      ( 25 Oct 2019 06:59 )             34.8     10-25    134<L>  |  98  |  12  ----------------------------<  101<H>  4.4   |  25  |  0.95    Ca    9.1      25 Oct 2019 06:59  Phos  3.7     10-25  Mg     1.8     10-25      PT/INR - ( 24 Oct 2019 10:16 )   PT: 12.1 sec;   INR: 1.07          PTT - ( 24 Oct 2019 10:16 )  PTT:143.4 sec    CAPILLARY BLOOD GLUCOSE            RADIOLOGY & ADDITIONAL STUDIES:              68F s/p left carotid endarterectomy for ZENAIDA POD0    Diet: clear liquids - advance as tolerated  Pain/nausea control - no narcotics  Home meds  BP control <160  AGUILA out  D/C today with instructions for f/u with Jessica

## 2019-10-28 PROBLEM — I65.29 OCCLUSION AND STENOSIS OF UNSPECIFIED CAROTID ARTERY: Chronic | Status: ACTIVE | Noted: 2019-10-23

## 2019-10-28 PROBLEM — J45.909 UNSPECIFIED ASTHMA, UNCOMPLICATED: Chronic | Status: ACTIVE | Noted: 2019-10-23

## 2019-10-28 PROBLEM — N39.0 URINARY TRACT INFECTION, SITE NOT SPECIFIED: Chronic | Status: ACTIVE | Noted: 2019-10-23

## 2019-10-28 PROBLEM — I10 ESSENTIAL (PRIMARY) HYPERTENSION: Chronic | Status: ACTIVE | Noted: 2019-10-23

## 2019-10-28 PROBLEM — E78.5 HYPERLIPIDEMIA, UNSPECIFIED: Chronic | Status: ACTIVE | Noted: 2019-10-23

## 2019-10-28 PROBLEM — M81.0 AGE-RELATED OSTEOPOROSIS WITHOUT CURRENT PATHOLOGICAL FRACTURE: Chronic | Status: ACTIVE | Noted: 2019-10-23

## 2019-10-30 DIAGNOSIS — I73.9 PERIPHERAL VASCULAR DISEASE, UNSPECIFIED: ICD-10-CM

## 2019-10-30 DIAGNOSIS — I65.22 OCCLUSION AND STENOSIS OF LEFT CAROTID ARTERY: ICD-10-CM

## 2019-10-30 DIAGNOSIS — R63.6 UNDERWEIGHT: ICD-10-CM

## 2019-10-30 DIAGNOSIS — Z87.440 PERSONAL HISTORY OF URINARY (TRACT) INFECTIONS: ICD-10-CM

## 2019-10-30 DIAGNOSIS — E78.5 HYPERLIPIDEMIA, UNSPECIFIED: ICD-10-CM

## 2019-10-30 DIAGNOSIS — Z85.820 PERSONAL HISTORY OF MALIGNANT MELANOMA OF SKIN: ICD-10-CM

## 2019-10-30 DIAGNOSIS — I10 ESSENTIAL (PRIMARY) HYPERTENSION: ICD-10-CM

## 2019-10-30 DIAGNOSIS — M81.0 AGE-RELATED OSTEOPOROSIS WITHOUT CURRENT PATHOLOGICAL FRACTURE: ICD-10-CM

## 2019-11-06 LAB — SURGICAL PATHOLOGY STUDY: SIGNIFICANT CHANGE UP

## 2019-11-06 PROCEDURE — 86900 BLOOD TYPING SEROLOGIC ABO: CPT

## 2019-11-06 PROCEDURE — 85027 COMPLETE CBC AUTOMATED: CPT

## 2019-11-06 PROCEDURE — 85025 COMPLETE CBC W/AUTO DIFF WBC: CPT

## 2019-11-06 PROCEDURE — 86901 BLOOD TYPING SEROLOGIC RH(D): CPT

## 2019-11-06 PROCEDURE — 85610 PROTHROMBIN TIME: CPT

## 2019-11-06 PROCEDURE — 83036 HEMOGLOBIN GLYCOSYLATED A1C: CPT

## 2019-11-06 PROCEDURE — C1889: CPT

## 2019-11-06 PROCEDURE — 86850 RBC ANTIBODY SCREEN: CPT

## 2019-11-06 PROCEDURE — 88304 TISSUE EXAM BY PATHOLOGIST: CPT

## 2019-11-06 PROCEDURE — 85730 THROMBOPLASTIN TIME PARTIAL: CPT

## 2019-11-06 PROCEDURE — 84100 ASSAY OF PHOSPHORUS: CPT

## 2019-11-06 PROCEDURE — 83735 ASSAY OF MAGNESIUM: CPT

## 2019-11-06 PROCEDURE — 80048 BASIC METABOLIC PNL TOTAL CA: CPT

## 2019-11-06 PROCEDURE — 36415 COLL VENOUS BLD VENIPUNCTURE: CPT

## 2019-11-06 PROCEDURE — 86803 HEPATITIS C AB TEST: CPT

## 2019-11-06 PROCEDURE — 88311 DECALCIFY TISSUE: CPT

## 2019-11-08 ENCOUNTER — APPOINTMENT (OUTPATIENT)
Dept: VASCULAR SURGERY | Facility: CLINIC | Age: 69
End: 2019-11-08
Payer: COMMERCIAL

## 2019-11-08 VITALS — DIASTOLIC BLOOD PRESSURE: 78 MMHG | HEART RATE: 75 BPM | SYSTOLIC BLOOD PRESSURE: 127 MMHG

## 2019-11-08 PROCEDURE — 99024 POSTOP FOLLOW-UP VISIT: CPT

## 2019-11-08 PROCEDURE — 93880 EXTRACRANIAL BILAT STUDY: CPT

## 2019-11-08 NOTE — REASON FOR VISIT
[Spouse] : spouse [de-identified] : L CEA [de-identified] : 10/24/2019 [de-identified] : 69 yo F with high grade stenosis in L ICA requiring CEA that was done on 10/24/19 returns for a post-op visit. Patient is doing well, denies dizziness, lightheadedness, other neurologic deficits. No fever, chills.

## 2019-11-08 NOTE — PHYSICAL EXAM
[Normal Breath Sounds] : Normal breath sounds [Normal Heart Sounds] : normal heart sounds [Normal Rate and Rhythm] : normal rate and rhythm [2+] : left 2+ [JVD] : no jugular venous distention  [de-identified] : pleasant, NAD [de-identified] : NC/AT [de-identified] : L side: well healing incision, no signs of infection

## 2019-11-08 NOTE — DISCUSSION/SUMMARY
[FreeTextEntry1] : 67 yo F with high grade stenosis in L ICA requiring CEA that was done on 10/24/19 returns for a post-op visit. \par Patient feels good, no neurologic deficits.\par L sided neck incision is healing well.\par Carotid duplex demonstrates patent CEA site with good flow, R ICA closer to 70%.\par F/u in 3 months

## 2020-02-10 ENCOUNTER — APPOINTMENT (OUTPATIENT)
Dept: VASCULAR SURGERY | Facility: CLINIC | Age: 70
End: 2020-02-10
Payer: COMMERCIAL

## 2020-02-10 PROCEDURE — 93880 EXTRACRANIAL BILAT STUDY: CPT

## 2020-02-10 PROCEDURE — 99213 OFFICE O/P EST LOW 20 MIN: CPT

## 2020-02-11 NOTE — PHYSICAL EXAM
[Normal Breath Sounds] : Normal breath sounds [2+] : left 2+ [No Rash or Lesion] : No rash or lesion [Alert] : alert [Calm] : calm [Oriented to Person] : oriented to person [Oriented to Place] : oriented to place [Oriented to Time] : oriented to time [JVD] : no jugular venous distention  [Right Carotid Bruit] : no bruit heard over the right carotid [Left Carotid Bruit] : no bruit heard over the left carotid [de-identified] : Well appearing. NAD [de-identified] : NCAT [de-identified] : +FROM B/L

## 2020-02-11 NOTE — HISTORY OF PRESENT ILLNESS
[FreeTextEntry1] : 68 y/o F with PAD, CAD, HTN, HLD on Cilostazol presents today for a follow-up on carotids. Patient reports feeling well today. Denies any TIA/CVA symptoms. Patient states that she has been following a healthy diet by avoiding meat, cheese and salty foods.

## 2020-02-11 NOTE — ADDENDUM
[FreeTextEntry1] : Documented by Nory Mckeon acting as a scribe for Dr. May Lopez on 02/10/2020\par

## 2020-02-11 NOTE — END OF VISIT
[FreeTextEntry3] : All medical record entries made by the Scribe were at my, Dr. Lopez's direction and personally dictated by me on 02/10/2020 . I have reviewed the chart and agree that the record accurately reflects my personal performance of the history, physical exam, assessment and plan. I have also personally directed, reviewed, and agreed with the chart.\par

## 2020-02-11 NOTE — PROCEDURE
[FreeTextEntry1] : Carotids duplex demonstrates >70% fibrocalcification on R carotids and L CEA patent.\par No significant changes from the previous.

## 2020-06-10 ENCOUNTER — RX RENEWAL (OUTPATIENT)
Age: 70
End: 2020-06-10

## 2020-07-10 ENCOUNTER — APPOINTMENT (OUTPATIENT)
Dept: INTERNAL MEDICINE | Facility: CLINIC | Age: 70
End: 2020-07-10
Payer: COMMERCIAL

## 2020-07-10 DIAGNOSIS — K52.9 NONINFECTIVE GASTROENTERITIS AND COLITIS, UNSPECIFIED: ICD-10-CM

## 2020-07-10 PROCEDURE — 36415 COLL VENOUS BLD VENIPUNCTURE: CPT

## 2020-07-10 PROCEDURE — 99213 OFFICE O/P EST LOW 20 MIN: CPT

## 2020-07-10 NOTE — HISTORY OF PRESENT ILLNESS
[de-identified] : 4 d of vomiting and diarrhea.    last 24 hours have imporved, had been drinking pedialyte. DId have outside dining.  She did have a chicken dish.  Did not have fever  temp was 96.8  \par

## 2020-07-10 NOTE — PHYSICAL EXAM
[Regular Rhythm] : with a regular rhythm [Normal S1, S2] : normal S1 and S2 [Normal] : soft, non-tender, non-distended, no masses palpated, no HSM and normal bowel sounds [de-identified] : tachycardia

## 2020-07-12 LAB
ALBUMIN SERPL ELPH-MCNC: 4.5 G/DL
ALP BLD-CCNC: 78 U/L
ALT SERPL-CCNC: 47 U/L
AMYLASE/CREAT SERPL: 57 U/L
ANION GAP SERPL CALC-SCNC: 17 MMOL/L
AST SERPL-CCNC: 39 U/L
BASOPHILS # BLD AUTO: 0.08 K/UL
BASOPHILS NFR BLD AUTO: 0.7 %
BILIRUB SERPL-MCNC: 0.7 MG/DL
BUN SERPL-MCNC: 30 MG/DL
CALCIUM SERPL-MCNC: 10.4 MG/DL
CHLORIDE SERPL-SCNC: 95 MMOL/L
CO2 SERPL-SCNC: 18 MMOL/L
CREAT SERPL-MCNC: 1.58 MG/DL
EOSINOPHIL # BLD AUTO: 0.18 K/UL
EOSINOPHIL NFR BLD AUTO: 1.6 %
GLUCOSE SERPL-MCNC: 103 MG/DL
HCT VFR BLD CALC: 42.4 %
HGB BLD-MCNC: 13.2 G/DL
IMM GRANULOCYTES NFR BLD AUTO: 0.4 %
LPL SERPL-CCNC: 97 U/L
LYMPHOCYTES # BLD AUTO: 2.19 K/UL
LYMPHOCYTES NFR BLD AUTO: 19.9 %
MAN DIFF?: NORMAL
MCHC RBC-ENTMCNC: 31.1 GM/DL
MCHC RBC-ENTMCNC: 32 PG
MCV RBC AUTO: 102.9 FL
MONOCYTES # BLD AUTO: 1.21 K/UL
MONOCYTES NFR BLD AUTO: 11 %
NEUTROPHILS # BLD AUTO: 7.29 K/UL
NEUTROPHILS NFR BLD AUTO: 66.4 %
PLATELET # BLD AUTO: 360 K/UL
POTASSIUM SERPL-SCNC: 5.1 MMOL/L
PROT SERPL-MCNC: 6.6 G/DL
RBC # BLD: 4.12 M/UL
RBC # FLD: 14.2 %
SODIUM SERPL-SCNC: 130 MMOL/L
WBC # FLD AUTO: 10.99 K/UL

## 2020-07-15 ENCOUNTER — LABORATORY RESULT (OUTPATIENT)
Age: 70
End: 2020-07-15

## 2020-07-16 ENCOUNTER — APPOINTMENT (OUTPATIENT)
Dept: INTERNAL MEDICINE | Facility: CLINIC | Age: 70
End: 2020-07-16
Payer: COMMERCIAL

## 2020-07-16 DIAGNOSIS — E86.0 DEHYDRATION: ICD-10-CM

## 2020-07-16 PROCEDURE — 36415 COLL VENOUS BLD VENIPUNCTURE: CPT

## 2020-07-16 PROCEDURE — 99213 OFFICE O/P EST LOW 20 MIN: CPT

## 2020-07-16 NOTE — HISTORY OF PRESENT ILLNESS
[de-identified] : s/p gastroenteritis with hypotension\par  now much better.\par  \par had SORAIDA on labs-  pre renal here to repeat labs today

## 2020-07-17 LAB
ANION GAP SERPL CALC-SCNC: 15 MMOL/L
BASOPHILS # BLD AUTO: 0.21 K/UL
BASOPHILS NFR BLD AUTO: 2.6 %
BUN SERPL-MCNC: 20 MG/DL
CALCIUM SERPL-MCNC: 10 MG/DL
CHLORIDE SERPL-SCNC: 100 MMOL/L
CO2 SERPL-SCNC: 23 MMOL/L
CREAT SERPL-MCNC: 0.97 MG/DL
EOSINOPHIL # BLD AUTO: 0.15 K/UL
EOSINOPHIL NFR BLD AUTO: 1.8 %
GLUCOSE SERPL-MCNC: 96 MG/DL
HCT VFR BLD CALC: 39.5 %
HGB BLD-MCNC: 12.2 G/DL
LYMPHOCYTES # BLD AUTO: 1.45 K/UL
LYMPHOCYTES NFR BLD AUTO: 17.7 %
MAN DIFF?: NORMAL
MCHC RBC-ENTMCNC: 30.9 GM/DL
MCHC RBC-ENTMCNC: 32.4 PG
MCV RBC AUTO: 105.1 FL
MONOCYTES # BLD AUTO: 0.66 K/UL
MONOCYTES NFR BLD AUTO: 8 %
NEUTROPHILS # BLD AUTO: 5.53 K/UL
NEUTROPHILS NFR BLD AUTO: 65.5 %
PLATELET # BLD AUTO: 353 K/UL
POTASSIUM SERPL-SCNC: 5.1 MMOL/L
RBC # BLD: 3.76 M/UL
RBC # FLD: 13.6 %
SARS-COV-2 IGG SERPL IA-ACNC: <0.1 INDEX
SARS-COV-2 IGG SERPL QL IA: NEGATIVE
SODIUM SERPL-SCNC: 137 MMOL/L
WBC # FLD AUTO: 8.21 K/UL

## 2020-08-10 ENCOUNTER — APPOINTMENT (OUTPATIENT)
Dept: VASCULAR SURGERY | Facility: CLINIC | Age: 70
End: 2020-08-10
Payer: COMMERCIAL

## 2020-08-10 PROCEDURE — 99213 OFFICE O/P EST LOW 20 MIN: CPT

## 2020-08-10 PROCEDURE — 93880 EXTRACRANIAL BILAT STUDY: CPT

## 2020-08-11 ENCOUNTER — APPOINTMENT (OUTPATIENT)
Dept: INTERNAL MEDICINE | Facility: CLINIC | Age: 70
End: 2020-08-11
Payer: COMMERCIAL

## 2020-08-11 ENCOUNTER — LABORATORY RESULT (OUTPATIENT)
Age: 70
End: 2020-08-11

## 2020-08-11 VITALS
OXYGEN SATURATION: 96 % | BODY MASS INDEX: 17.48 KG/M2 | HEART RATE: 76 BPM | WEIGHT: 118 LBS | SYSTOLIC BLOOD PRESSURE: 124 MMHG | HEIGHT: 69 IN | DIASTOLIC BLOOD PRESSURE: 78 MMHG | RESPIRATION RATE: 16 BRPM | TEMPERATURE: 98 F

## 2020-08-11 PROCEDURE — 99397 PER PM REEVAL EST PAT 65+ YR: CPT | Mod: 25

## 2020-08-11 PROCEDURE — 90732 PPSV23 VACC 2 YRS+ SUBQ/IM: CPT

## 2020-08-11 PROCEDURE — G0009: CPT

## 2020-08-11 PROCEDURE — 36415 COLL VENOUS BLD VENIPUNCTURE: CPT

## 2020-08-11 NOTE — HISTORY OF PRESENT ILLNESS
[de-identified] : 70 yo F with PAD, HTN, HLD, asthma, melanoma s/p resection in 2011, Carotid artery disease s/p left CEA here for wellness\par \par \par Has some urinary frequency now.  GOing a lot - feeling fine.  Drinking more water. \par COlonoscopy 2015? Dr Goldberg - due AGAIN\par Mammo:  due\par Dexa: last year\par Vaccines needs pneumovax

## 2020-08-11 NOTE — PLAN
[FreeTextEntry1] : wellness complete\par  labs today\par uptodate with HCM\par  Pneumomvax today .\par \par Cardiology evaluation with PAD history\par  cont statin\par HTN controlled

## 2020-08-12 LAB
25(OH)D3 SERPL-MCNC: 46.8 NG/ML
ALBUMIN SERPL ELPH-MCNC: 4.7 G/DL
ALP BLD-CCNC: 97 U/L
ALT SERPL-CCNC: 40 U/L
ANION GAP SERPL CALC-SCNC: 14 MMOL/L
APPEARANCE: ABNORMAL
AST SERPL-CCNC: 34 U/L
BASOPHILS # BLD AUTO: 0.07 K/UL
BASOPHILS NFR BLD AUTO: 1.2 %
BILIRUB SERPL-MCNC: 0.6 MG/DL
BILIRUBIN URINE: NEGATIVE
BLOOD URINE: NEGATIVE
BUN SERPL-MCNC: 28 MG/DL
CALCIUM SERPL-MCNC: 9.9 MG/DL
CHLORIDE SERPL-SCNC: 101 MMOL/L
CHOLEST SERPL-MCNC: 199 MG/DL
CHOLEST/HDLC SERPL: 1.7 RATIO
CO2 SERPL-SCNC: 23 MMOL/L
COLOR: YELLOW
CREAT SERPL-MCNC: 0.92 MG/DL
EOSINOPHIL # BLD AUTO: 0.24 K/UL
EOSINOPHIL NFR BLD AUTO: 4 %
ESTIMATED AVERAGE GLUCOSE: 108 MG/DL
GLUCOSE QUALITATIVE U: NEGATIVE
GLUCOSE SERPL-MCNC: 95 MG/DL
HBA1C MFR BLD HPLC: 5.4 %
HCT VFR BLD CALC: 42.7 %
HDLC SERPL-MCNC: 114 MG/DL
HGB BLD-MCNC: 13 G/DL
IMM GRANULOCYTES NFR BLD AUTO: 0.2 %
KETONES URINE: NEGATIVE
LDLC SERPL CALC-MCNC: 75 MG/DL
LEUKOCYTE ESTERASE URINE: NEGATIVE
LYMPHOCYTES # BLD AUTO: 1.58 K/UL
LYMPHOCYTES NFR BLD AUTO: 26.1 %
MAN DIFF?: NORMAL
MCHC RBC-ENTMCNC: 30.4 GM/DL
MCHC RBC-ENTMCNC: 33 PG
MCV RBC AUTO: 108.4 FL
MONOCYTES # BLD AUTO: 0.7 K/UL
MONOCYTES NFR BLD AUTO: 11.6 %
NEUTROPHILS # BLD AUTO: 3.46 K/UL
NEUTROPHILS NFR BLD AUTO: 56.9 %
NITRITE URINE: NEGATIVE
PH URINE: 5.5
PLATELET # BLD AUTO: 273 K/UL
POTASSIUM SERPL-SCNC: 4.7 MMOL/L
PROT SERPL-MCNC: 7 G/DL
PROTEIN URINE: NEGATIVE
RBC # BLD: 3.94 M/UL
RBC # FLD: 14.7 %
SARS-COV-2 IGG SERPL IA-ACNC: <0.1 INDEX
SARS-COV-2 IGG SERPL QL IA: NEGATIVE
SODIUM SERPL-SCNC: 138 MMOL/L
SPECIFIC GRAVITY URINE: 1.01
TRIGL SERPL-MCNC: 49 MG/DL
UROBILINOGEN URINE: NORMAL
WBC # FLD AUTO: 6.06 K/UL

## 2020-08-12 NOTE — ASSESSMENT
[Carotid Endarterectomy] : carotid endarterectomy [Arterial/Venous Disease] : arterial/venous disease [FreeTextEntry1] : 70 y/o F with PAD, CAD, HTN, HLD on Cilostazol presents today for a follow-up. Patient is feeling well today. Patient is stable and asymptomatic for any TIA/CVA symptoms. Carotids duplex today demonstrates >70% fibrocalcification on R carotids and L CEA patent. \par Stable claudication, c/w Cilostazol, walking and exercise.\par Will continue to monitor patient. To f/u in 6 months.\par

## 2020-08-12 NOTE — PROCEDURE
[FreeTextEntry1] : Carotids duplex demonstrates >70% fibrocalcification on R carotids and L CEA patent.\par No significant changes from the previous. \par DINO/PVR: R: 0.67; L: 1.0

## 2020-08-12 NOTE — HISTORY OF PRESENT ILLNESS
[FreeTextEntry1] : 68 y/o F with PAD, CAD, HTN, HLD on Cilostazol presents today for a a routine 6-months follow-up. Patient reports feeling well today. Denies any TIA/CVA symptoms. Patient is doing well overall, denies claudication and she is compliant with Cilostazol. No rest pain, no skin changes.

## 2020-08-12 NOTE — PHYSICAL EXAM
[Normal Breath Sounds] : Normal breath sounds [No Rash or Lesion] : No rash or lesion [Alert] : alert [Oriented to Person] : oriented to person [Oriented to Place] : oriented to place [Oriented to Time] : oriented to time [Calm] : calm [1+] : right 1+ [2+] : left 2+ [Left Carotid Bruit] : no bruit heard over the left carotid [JVD] : no jugular venous distention  [Right Carotid Bruit] : no bruit heard over the right carotid [Abdomen Tenderness] : ~T ~M No abdominal tenderness [] : not present [Ankle Swelling (On Exam)] : not present [Varicose Veins Of Lower Extremities] : not present [de-identified] : Well appearing. NAD [de-identified] : +FROM B/L [de-identified] : NCAT [de-identified] : grossly intact

## 2020-08-14 NOTE — PATIENT PROFILE ADULT - FALL HARM RISK CONCLUSION
[FreeTextEntry1] : The PSA level was checked today to establish a baseline for the patient.  His PSA is 1.3 ng/mL.  This is a normal level for his age and does not raise any specific concern at this time.  I do not think he needs to consider either imaging or biopsy of the prostate.\par \par With regard to the urinary symptoms, I recommended that he undergo testing with urinalysis, culture, and post void residual bladder scan today.  The bladder scan shows that he does empty well with no significant residual finding.\par \par I have recommended that we follow-up by phone regarding his lab work evaluation otherwise.  We also did review options for management of the nocturia.  I would recommend initially that he start simply with some lifestyle modification including reducing his nighttime fluid intake and overnight fluid intake.  We also could consider the use of anticholinergic medication or alpha blockers to see if this would help mitigate his nocturia.  However, we will start with the lifestyle modification to see if we can avoid the use of any medical therapy here.\par \par 
Universal Safety Interventions

## 2020-08-31 ENCOUNTER — APPOINTMENT (OUTPATIENT)
Dept: HEART AND VASCULAR | Facility: CLINIC | Age: 70
End: 2020-08-31
Payer: COMMERCIAL

## 2020-08-31 VITALS
OXYGEN SATURATION: 95 % | HEIGHT: 69 IN | WEIGHT: 118 LBS | RESPIRATION RATE: 14 BRPM | BODY MASS INDEX: 17.48 KG/M2 | SYSTOLIC BLOOD PRESSURE: 156 MMHG | HEART RATE: 91 BPM | DIASTOLIC BLOOD PRESSURE: 74 MMHG | TEMPERATURE: 97.6 F

## 2020-08-31 PROCEDURE — 93306 TTE W/DOPPLER COMPLETE: CPT

## 2020-08-31 PROCEDURE — 99214 OFFICE O/P EST MOD 30 MIN: CPT | Mod: 25

## 2020-08-31 PROCEDURE — 93000 ELECTROCARDIOGRAM COMPLETE: CPT

## 2020-08-31 NOTE — DISCUSSION/SUMMARY
[FreeTextEntry1] : [place]GL[place]- + vascular disease, recommend nuclear stress\par htn stable on meds\par lipids continue statin

## 2020-08-31 NOTE — PHYSICAL EXAM
[General Appearance - Well Developed] : well developed [Normal Appearance] : normal appearance [Well Groomed] : well groomed [General Appearance - In No Acute Distress] : no acute distress [General Appearance - Well Nourished] : well nourished [No Deformities] : no deformities [Normal Conjunctiva] : the conjunctiva exhibited no abnormalities [Eyelids - No Xanthelasma] : the eyelids demonstrated no xanthelasmas [No Oral Pallor] : no oral pallor [Normal Oral Mucosa] : normal oral mucosa [No Oral Cyanosis] : no oral cyanosis [Normal Jugular Venous A Waves Present] : normal jugular venous A waves present [Normal Jugular Venous V Waves Present] : normal jugular venous V waves present [No Jugular Venous Hubbard A Waves] : no jugular venous hubbard A waves [Respiration, Rhythm And Depth] : normal respiratory rhythm and effort [Exaggerated Use Of Accessory Muscles For Inspiration] : no accessory muscle use [Auscultation Breath Sounds / Voice Sounds] : lungs were clear to auscultation bilaterally [Heart Rate And Rhythm] : heart rate and rhythm were normal [Murmurs] : no murmurs present [Heart Sounds] : normal S1 and S2 [Abdomen Soft] : soft [Abdomen Tenderness] : non-tender [Abdomen Mass (___ Cm)] : no abdominal mass palpated [Gait - Sufficient For Exercise Testing] : the gait was sufficient for exercise testing [Nail Clubbing] : no clubbing of the fingernails [Petechial Hemorrhages (___cm)] : no petechial hemorrhages [Cyanosis, Localized] : no localized cyanosis [] : no ischemic changes [Oriented To Time, Place, And Person] : oriented to person, place, and time [Affect] : the affect was normal [Mood] : the mood was normal [No Anxiety] : not feeling anxious

## 2020-08-31 NOTE — REASON FOR VISIT
[Initial Evaluation] : an initial evaluation of [Dyspnea] : dyspnea [Hypertension] : hypertension [Peripheral Vascular Disease] : peripheral vascular disease

## 2020-08-31 NOTE — HISTORY OF PRESENT ILLNESS
[FreeTextEntry1] : sent by pcp for cardiav evaluation\par recent CEA, claudication treated medically\par c/o ENGL[place] especially with hills\par HTN- bp controlled\par claudication not limited flat ground

## 2020-09-17 ENCOUNTER — RX RENEWAL (OUTPATIENT)
Age: 70
End: 2020-09-17

## 2020-09-18 ENCOUNTER — TRANSCRIPTION ENCOUNTER (OUTPATIENT)
Age: 70
End: 2020-09-18

## 2020-09-22 ENCOUNTER — TRANSCRIPTION ENCOUNTER (OUTPATIENT)
Age: 70
End: 2020-09-22

## 2020-09-23 ENCOUNTER — APPOINTMENT (OUTPATIENT)
Dept: INTERNAL MEDICINE | Facility: CLINIC | Age: 70
End: 2020-09-23
Payer: COMMERCIAL

## 2020-09-23 PROCEDURE — 90471 IMMUNIZATION ADMIN: CPT

## 2020-09-23 PROCEDURE — 90662 IIV NO PRSV INCREASED AG IM: CPT

## 2020-09-24 ENCOUNTER — APPOINTMENT (OUTPATIENT)
Dept: HEART AND VASCULAR | Facility: CLINIC | Age: 70
End: 2020-09-24

## 2020-09-24 ENCOUNTER — APPOINTMENT (OUTPATIENT)
Dept: HEART AND VASCULAR | Facility: CLINIC | Age: 70
End: 2020-09-24
Payer: COMMERCIAL

## 2020-09-24 DIAGNOSIS — I73.9 PERIPHERAL VASCULAR DISEASE, UNSPECIFIED: ICD-10-CM

## 2020-09-24 PROCEDURE — A9500: CPT

## 2020-09-24 PROCEDURE — 93015 CV STRESS TEST SUPVJ I&R: CPT

## 2020-09-24 PROCEDURE — 78451 HT MUSCLE IMAGE SPECT SING: CPT

## 2020-09-24 PROCEDURE — 99214 OFFICE O/P EST MOD 30 MIN: CPT | Mod: 25

## 2020-09-24 NOTE — REASON FOR VISIT
[Follow-Up - Clinic] : a clinic follow-up of [Dyspnea] : dyspnea [Hypertension] : hypertension [FreeTextEntry1] : claudication

## 2020-09-24 NOTE — PHYSICAL EXAM
[General Appearance - Well Developed] : well developed [Normal Appearance] : normal appearance [Well Groomed] : well groomed [General Appearance - Well Nourished] : well nourished [No Deformities] : no deformities [General Appearance - In No Acute Distress] : no acute distress [Normal Conjunctiva] : the conjunctiva exhibited no abnormalities [Eyelids - No Xanthelasma] : the eyelids demonstrated no xanthelasmas [Normal Oral Mucosa] : normal oral mucosa [No Oral Pallor] : no oral pallor [No Oral Cyanosis] : no oral cyanosis [Normal Jugular Venous A Waves Present] : normal jugular venous A waves present [Normal Jugular Venous V Waves Present] : normal jugular venous V waves present [No Jugular Venous Hubbard A Waves] : no jugular venous hubbard A waves [Respiration, Rhythm And Depth] : normal respiratory rhythm and effort [Exaggerated Use Of Accessory Muscles For Inspiration] : no accessory muscle use [Auscultation Breath Sounds / Voice Sounds] : lungs were clear to auscultation bilaterally [Heart Rate And Rhythm] : heart rate and rhythm were normal [Heart Sounds] : normal S1 and S2 [Murmurs] : no murmurs present [Abdomen Soft] : soft [Abdomen Tenderness] : non-tender [Abdomen Mass (___ Cm)] : no abdominal mass palpated [Gait - Sufficient For Exercise Testing] : the gait was sufficient for exercise testing [Nail Clubbing] : no clubbing of the fingernails [Cyanosis, Localized] : no localized cyanosis [Petechial Hemorrhages (___cm)] : no petechial hemorrhages [] : no ischemic changes [Oriented To Time, Place, And Person] : oriented to person, place, and time [Affect] : the affect was normal [Mood] : the mood was normal [No Anxiety] : not feeling anxious

## 2020-09-24 NOTE — DISCUSSION/SUMMARY
[FreeTextEntry1] : sob- negative non invasive cardiac evaluation, results discussed/ reassurance given, increase activity\par HTN- stable on meds\par pad- stable

## 2020-09-28 ENCOUNTER — RX RENEWAL (OUTPATIENT)
Age: 70
End: 2020-09-28

## 2021-01-13 NOTE — DIETITIAN INITIAL EVALUATION ADULT. - PATIENT PROFILE REVIEWED
yes patient brought in by son for abdominal pain x 3 days. denies n/v/d, gu symptoms, fever, chills.

## 2021-02-08 ENCOUNTER — APPOINTMENT (OUTPATIENT)
Dept: VASCULAR SURGERY | Facility: CLINIC | Age: 71
End: 2021-02-08
Payer: COMMERCIAL

## 2021-02-08 PROCEDURE — 99214 OFFICE O/P EST MOD 30 MIN: CPT

## 2021-02-08 PROCEDURE — 99072 ADDL SUPL MATRL&STAF TM PHE: CPT

## 2021-02-08 PROCEDURE — 93880 EXTRACRANIAL BILAT STUDY: CPT

## 2021-02-08 NOTE — HISTORY OF PRESENT ILLNESS
[FreeTextEntry1] : 69 y/o F with CAD, HTN, HLD and PAD, high grade L ICA stenosis s/p L CEA with roof patch 10/2019 presents for follow up evaluation. Patient reports feeling well today. Denies any TIA/CVA symptoms. Patient is doing well overall, denies claudication and she is compliant with Cilostazol. No rest pain, no skin changes. She is allergic to Iodine contrast. \par \par \par She is accompanied by her .

## 2021-02-08 NOTE — ASSESSMENT
[FreeTextEntry1] : 71 y/o F with PAD, high grade L ICA stenosis s/p L CEA with roof patch 10/201. Patient is feeling well today. Patient is stable and asymptomatic for any TIA/CVA symptoms. Carotid US shows moderate stenosis of the R ICA with increase of velocities from previous studies, patent L CEA. Pt to complete a CTA of the neck to determine further plan of treatment, she agrees and accepts. Will seek authorization from her insurance,once approved will contact pt to schedule date and will follow up to discuss results.

## 2021-02-08 NOTE — ADDENDUM
[FreeTextEntry1] : This note was written by Tamela Schulz on 02/08/2021 acting as scribe for May Negron M.D.\par \par I, May Pérez have read and attest that all the information, medical decision making and discharge instructions within are true and accurate.

## 2021-02-08 NOTE — PHYSICAL EXAM
[1+] : right 1+ [2+] : left 2+ [No Rash or Lesion] : No rash or lesion [Alert] : alert [Oriented to Person] : oriented to person [Oriented to Place] : oriented to place [Oriented to Time] : oriented to time [Calm] : calm [Normal Rate and Rhythm] : normal rate and rhythm [Respiratory Effort] : normal respiratory effort [JVD] : no jugular venous distention  [Right Carotid Bruit] : no bruit heard over the right carotid [Ankle Swelling (On Exam)] : not present [Left Carotid Bruit] : no bruit heard over the left carotid [Varicose Veins Of Lower Extremities] : not present [] : not present [Abdomen Tenderness] : ~T ~M No abdominal tenderness [de-identified] : Well appearing. NAD [de-identified] : NCAT [de-identified] : Supple  [de-identified] : FROM  [de-identified] : grossly intact

## 2021-02-17 ENCOUNTER — TRANSCRIPTION ENCOUNTER (OUTPATIENT)
Age: 71
End: 2021-02-17

## 2021-02-23 ENCOUNTER — RX RENEWAL (OUTPATIENT)
Age: 71
End: 2021-02-23

## 2021-03-01 ENCOUNTER — OUTPATIENT (OUTPATIENT)
Dept: OUTPATIENT SERVICES | Facility: HOSPITAL | Age: 71
LOS: 1 days | End: 2021-03-01
Payer: COMMERCIAL

## 2021-03-01 ENCOUNTER — APPOINTMENT (OUTPATIENT)
Dept: CT IMAGING | Facility: HOSPITAL | Age: 71
End: 2021-03-01

## 2021-03-01 ENCOUNTER — RESULT REVIEW (OUTPATIENT)
Age: 71
End: 2021-03-01

## 2021-03-01 DIAGNOSIS — Z41.9 ENCOUNTER FOR PROCEDURE FOR PURPOSES OTHER THAN REMEDYING HEALTH STATE, UNSPECIFIED: Chronic | ICD-10-CM

## 2021-03-01 DIAGNOSIS — Z90.89 ACQUIRED ABSENCE OF OTHER ORGANS: Chronic | ICD-10-CM

## 2021-03-01 DIAGNOSIS — Z87.59 PERSONAL HISTORY OF OTHER COMPLICATIONS OF PREGNANCY, CHILDBIRTH AND THE PUERPERIUM: Chronic | ICD-10-CM

## 2021-03-01 PROCEDURE — 70498 CT ANGIOGRAPHY NECK: CPT | Mod: 26

## 2021-03-01 PROCEDURE — 70498 CT ANGIOGRAPHY NECK: CPT

## 2021-03-15 ENCOUNTER — TRANSCRIPTION ENCOUNTER (OUTPATIENT)
Age: 71
End: 2021-03-15

## 2021-03-22 ENCOUNTER — APPOINTMENT (OUTPATIENT)
Dept: VASCULAR SURGERY | Facility: CLINIC | Age: 71
End: 2021-03-22
Payer: COMMERCIAL

## 2021-03-22 PROCEDURE — 99072 ADDL SUPL MATRL&STAF TM PHE: CPT

## 2021-03-22 PROCEDURE — 99213 OFFICE O/P EST LOW 20 MIN: CPT

## 2021-03-22 NOTE — ASSESSMENT
[Carotid Endarterectomy] : carotid endarterectomy [FreeTextEntry1] : 69 y/o F with PAD, high grade L ICA stenosis s/p L CEA with roof patch 10/201. Patient is feeling well today. Patient is stable and asymptomatic for any TIA/CVA symptoms. Patient is now with high grade R ICA stenosis and she is here to discuss the surgery.\par All questions and concerns were answered.\par Patient will see Dr. Rogers on 4/12/21 for pre-op clearance.\par Patient is scheduled for R CEA on 4/22/21.

## 2021-03-22 NOTE — HISTORY OF PRESENT ILLNESS
[FreeTextEntry1] : 71 y/o F with CAD, HTN, HLD and PAD, high grade L ICA stenosis s/p L CEA with roof patch 10/2019 and now progressed R ICA stenosis requiring CEA presents today to discuss the procedure. Patient reports feeling well today. Denies any TIA/CVA symptoms. \par \par She is accompanied by her .

## 2021-03-24 ENCOUNTER — NON-APPOINTMENT (OUTPATIENT)
Age: 71
End: 2021-03-24

## 2021-04-12 ENCOUNTER — APPOINTMENT (OUTPATIENT)
Dept: INTERNAL MEDICINE | Facility: CLINIC | Age: 71
End: 2021-04-12
Payer: COMMERCIAL

## 2021-04-12 VITALS
TEMPERATURE: 97.8 F | BODY MASS INDEX: 18.51 KG/M2 | SYSTOLIC BLOOD PRESSURE: 154 MMHG | OXYGEN SATURATION: 98 % | HEIGHT: 69 IN | HEART RATE: 68 BPM | DIASTOLIC BLOOD PRESSURE: 84 MMHG | WEIGHT: 125 LBS

## 2021-04-12 DIAGNOSIS — I73.00 RAYNAUD'S SYNDROME W/OUT GANGRENE: ICD-10-CM

## 2021-04-12 PROCEDURE — 99397 PER PM REEVAL EST PAT 65+ YR: CPT

## 2021-04-12 PROCEDURE — 93000 ELECTROCARDIOGRAM COMPLETE: CPT

## 2021-04-12 PROCEDURE — 36415 COLL VENOUS BLD VENIPUNCTURE: CPT

## 2021-04-12 PROCEDURE — 99072 ADDL SUPL MATRL&STAF TM PHE: CPT

## 2021-04-12 NOTE — PLAN
[FreeTextEntry1] : Wellness complete\par labs today\par HTN- not optimal- increase valsartan from 160 to 240 mg daily and monitor\par Plan for surgery for 4/22/21\par - pre-op labs today\par - EKG  -NSR\par optimized for CEA pending review of labs. \par \par Dexa and mammo in the fall\par Colonoscopy due this year

## 2021-04-12 NOTE — HISTORY OF PRESENT ILLNESS
[FreeTextEntry1] : wellness [de-identified] : 69 y/o F with CAD, HTN, HLD and PAD, high grade L ICA stenosis s/p L CEA\par plan for R CEA.\par Not sleeping well, very stressed SBP at home in the 130's\par repeat today 144/72 \par Due for colnoscopy qith DR goldberg  \par Dexa: more than 5 years

## 2021-04-13 LAB
25(OH)D3 SERPL-MCNC: 48 NG/ML
ALBUMIN SERPL ELPH-MCNC: 4.9 G/DL
ALP BLD-CCNC: 135 U/L
ALT SERPL-CCNC: 46 U/L
ANION GAP SERPL CALC-SCNC: 15 MMOL/L
APPEARANCE: CLEAR
APTT BLD: 34.2 SEC
AST SERPL-CCNC: 36 U/L
BASOPHILS # BLD AUTO: 0.07 K/UL
BASOPHILS NFR BLD AUTO: 1.1 %
BILIRUB SERPL-MCNC: 0.5 MG/DL
BILIRUBIN URINE: NEGATIVE
BLOOD URINE: NEGATIVE
BUN SERPL-MCNC: 27 MG/DL
CALCIUM SERPL-MCNC: 10.2 MG/DL
CHLORIDE SERPL-SCNC: 102 MMOL/L
CHOLEST SERPL-MCNC: 223 MG/DL
CO2 SERPL-SCNC: 22 MMOL/L
COLOR: NORMAL
CREAT SERPL-MCNC: 0.99 MG/DL
EOSINOPHIL # BLD AUTO: 0.23 K/UL
EOSINOPHIL NFR BLD AUTO: 3.5 %
ESTIMATED AVERAGE GLUCOSE: 114 MG/DL
GLUCOSE QUALITATIVE U: NEGATIVE
GLUCOSE SERPL-MCNC: 90 MG/DL
HBA1C MFR BLD HPLC: 5.6 %
HCT VFR BLD CALC: 43.2 %
HDLC SERPL-MCNC: 115 MG/DL
HGB BLD-MCNC: 13.9 G/DL
IMM GRANULOCYTES NFR BLD AUTO: 0.2 %
INR PPP: 0.89 RATIO
KETONES URINE: NEGATIVE
LDLC SERPL CALC-MCNC: 95 MG/DL
LEUKOCYTE ESTERASE URINE: NEGATIVE
LYMPHOCYTES # BLD AUTO: 1.52 K/UL
LYMPHOCYTES NFR BLD AUTO: 22.9 %
MAN DIFF?: NORMAL
MCHC RBC-ENTMCNC: 32.2 GM/DL
MCHC RBC-ENTMCNC: 33 PG
MCV RBC AUTO: 102.6 FL
MONOCYTES # BLD AUTO: 0.69 K/UL
MONOCYTES NFR BLD AUTO: 10.4 %
NEUTROPHILS # BLD AUTO: 4.13 K/UL
NEUTROPHILS NFR BLD AUTO: 61.9 %
NITRITE URINE: NEGATIVE
NONHDLC SERPL-MCNC: 108 MG/DL
PH URINE: 5.5
PLATELET # BLD AUTO: 267 K/UL
POTASSIUM SERPL-SCNC: 4.8 MMOL/L
PROT SERPL-MCNC: 7.4 G/DL
PROTEIN URINE: NEGATIVE
PT BLD: 10.6 SEC
RBC # BLD: 4.21 M/UL
RBC # FLD: 14 %
SODIUM SERPL-SCNC: 139 MMOL/L
SPECIFIC GRAVITY URINE: 1.01
TRIGL SERPL-MCNC: 64 MG/DL
TSH SERPL-ACNC: 2.78 UIU/ML
UROBILINOGEN URINE: NORMAL
WBC # FLD AUTO: 6.65 K/UL

## 2021-04-19 LAB — SARS-COV-2 N GENE NPH QL NAA+PROBE: NOT DETECTED

## 2021-04-21 ENCOUNTER — TRANSCRIPTION ENCOUNTER (OUTPATIENT)
Age: 71
End: 2021-04-21

## 2021-04-21 VITALS
HEART RATE: 92 BPM | TEMPERATURE: 97 F | OXYGEN SATURATION: 97 % | DIASTOLIC BLOOD PRESSURE: 89 MMHG | WEIGHT: 126.1 LBS | SYSTOLIC BLOOD PRESSURE: 157 MMHG | RESPIRATION RATE: 18 BRPM | HEIGHT: 69 IN

## 2021-04-22 ENCOUNTER — RESULT REVIEW (OUTPATIENT)
Age: 71
End: 2021-04-22

## 2021-04-22 ENCOUNTER — INPATIENT (INPATIENT)
Facility: HOSPITAL | Age: 71
LOS: 1 days | Discharge: HOME | DRG: 38 | End: 2021-04-24
Attending: SURGERY | Admitting: SURGERY
Payer: COMMERCIAL

## 2021-04-22 DIAGNOSIS — Z90.89 ACQUIRED ABSENCE OF OTHER ORGANS: Chronic | ICD-10-CM

## 2021-04-22 DIAGNOSIS — I65.21 OCCLUSION AND STENOSIS OF RIGHT CAROTID ARTERY: ICD-10-CM

## 2021-04-22 DIAGNOSIS — Z41.9 ENCOUNTER FOR PROCEDURE FOR PURPOSES OTHER THAN REMEDYING HEALTH STATE, UNSPECIFIED: Chronic | ICD-10-CM

## 2021-04-22 DIAGNOSIS — Z87.59 PERSONAL HISTORY OF OTHER COMPLICATIONS OF PREGNANCY, CHILDBIRTH AND THE PUERPERIUM: Chronic | ICD-10-CM

## 2021-04-22 LAB
ANION GAP SERPL CALC-SCNC: 10 MMOL/L — SIGNIFICANT CHANGE UP (ref 5–17)
APTT BLD: 32.6 SEC — SIGNIFICANT CHANGE UP (ref 27.5–35.5)
BUN SERPL-MCNC: 21 MG/DL — SIGNIFICANT CHANGE UP (ref 7–23)
CALCIUM SERPL-MCNC: 8.8 MG/DL — SIGNIFICANT CHANGE UP (ref 8.4–10.5)
CHLORIDE SERPL-SCNC: 103 MMOL/L — SIGNIFICANT CHANGE UP (ref 96–108)
CO2 SERPL-SCNC: 23 MMOL/L — SIGNIFICANT CHANGE UP (ref 22–31)
CREAT SERPL-MCNC: 0.86 MG/DL — SIGNIFICANT CHANGE UP (ref 0.5–1.3)
GLUCOSE SERPL-MCNC: 162 MG/DL — HIGH (ref 70–99)
HCT VFR BLD CALC: 33.8 % — LOW (ref 34.5–45)
HGB BLD-MCNC: 11.2 G/DL — LOW (ref 11.5–15.5)
INR BLD: 1.06 — SIGNIFICANT CHANGE UP (ref 0.88–1.16)
MAGNESIUM SERPL-MCNC: 1.6 MG/DL — SIGNIFICANT CHANGE UP (ref 1.6–2.6)
MCHC RBC-ENTMCNC: 32.1 PG — SIGNIFICANT CHANGE UP (ref 27–34)
MCHC RBC-ENTMCNC: 33.1 GM/DL — SIGNIFICANT CHANGE UP (ref 32–36)
MCV RBC AUTO: 96.8 FL — SIGNIFICANT CHANGE UP (ref 80–100)
NRBC # BLD: 0 /100 WBCS — SIGNIFICANT CHANGE UP (ref 0–0)
PHOSPHATE SERPL-MCNC: 3.2 MG/DL — SIGNIFICANT CHANGE UP (ref 2.5–4.5)
PLATELET # BLD AUTO: 226 K/UL — SIGNIFICANT CHANGE UP (ref 150–400)
POTASSIUM SERPL-MCNC: 4.4 MMOL/L — SIGNIFICANT CHANGE UP (ref 3.5–5.3)
POTASSIUM SERPL-SCNC: 4.4 MMOL/L — SIGNIFICANT CHANGE UP (ref 3.5–5.3)
PROTHROM AB SERPL-ACNC: 12.7 SEC — SIGNIFICANT CHANGE UP (ref 10.6–13.6)
RBC # BLD: 3.49 M/UL — LOW (ref 3.8–5.2)
RBC # FLD: 13.6 % — SIGNIFICANT CHANGE UP (ref 10.3–14.5)
SODIUM SERPL-SCNC: 136 MMOL/L — SIGNIFICANT CHANGE UP (ref 135–145)
WBC # BLD: 7.06 K/UL — SIGNIFICANT CHANGE UP (ref 3.8–10.5)
WBC # FLD AUTO: 7.06 K/UL — SIGNIFICANT CHANGE UP (ref 3.8–10.5)

## 2021-04-22 PROCEDURE — 88311 DECALCIFY TISSUE: CPT | Mod: 26

## 2021-04-22 PROCEDURE — 35301 RECHANNELING OF ARTERY: CPT | Mod: GC

## 2021-04-22 PROCEDURE — 99291 CRITICAL CARE FIRST HOUR: CPT

## 2021-04-22 PROCEDURE — 88304 TISSUE EXAM BY PATHOLOGIST: CPT | Mod: 26

## 2021-04-22 RX ORDER — LANOLIN ALCOHOL/MO/W.PET/CERES
5 CREAM (GRAM) TOPICAL AT BEDTIME
Refills: 0 | Status: DISCONTINUED | OUTPATIENT
Start: 2021-04-22 | End: 2021-04-24

## 2021-04-22 RX ORDER — ATORVASTATIN CALCIUM 80 MG/1
40 TABLET, FILM COATED ORAL AT BEDTIME
Refills: 0 | Status: DISCONTINUED | OUTPATIENT
Start: 2021-04-22 | End: 2021-04-24

## 2021-04-22 RX ORDER — CILOSTAZOL 100 MG/1
50 TABLET ORAL
Refills: 0 | Status: DISCONTINUED | OUTPATIENT
Start: 2021-04-22 | End: 2021-04-24

## 2021-04-22 RX ORDER — MAGNESIUM SULFATE 500 MG/ML
1 VIAL (ML) INJECTION ONCE
Refills: 0 | Status: COMPLETED | OUTPATIENT
Start: 2021-04-22 | End: 2021-04-22

## 2021-04-22 RX ORDER — CEFAZOLIN SODIUM 1 G
2000 VIAL (EA) INJECTION EVERY 8 HOURS
Refills: 0 | Status: COMPLETED | OUTPATIENT
Start: 2021-04-22 | End: 2021-04-23

## 2021-04-22 RX ORDER — SENNA PLUS 8.6 MG/1
2 TABLET ORAL DAILY
Refills: 0 | Status: DISCONTINUED | OUTPATIENT
Start: 2021-04-22 | End: 2021-04-24

## 2021-04-22 RX ORDER — MAGNESIUM SULFATE 500 MG/ML
2 VIAL (ML) INJECTION ONCE
Refills: 0 | Status: DISCONTINUED | OUTPATIENT
Start: 2021-04-22 | End: 2021-04-22

## 2021-04-22 RX ORDER — LORATADINE 10 MG/1
10 TABLET ORAL DAILY
Refills: 0 | Status: DISCONTINUED | OUTPATIENT
Start: 2021-04-22 | End: 2021-04-24

## 2021-04-22 RX ORDER — CEFAZOLIN SODIUM 1 G
2000 VIAL (EA) INJECTION EVERY 8 HOURS
Refills: 0 | Status: DISCONTINUED | OUTPATIENT
Start: 2021-04-22 | End: 2021-04-22

## 2021-04-22 RX ORDER — LATANOPROST 0.05 MG/ML
1 SOLUTION/ DROPS OPHTHALMIC; TOPICAL AT BEDTIME
Refills: 0 | Status: DISCONTINUED | OUTPATIENT
Start: 2021-04-22 | End: 2021-04-24

## 2021-04-22 RX ORDER — ASPIRIN/CALCIUM CARB/MAGNESIUM 324 MG
81 TABLET ORAL DAILY
Refills: 0 | Status: DISCONTINUED | OUTPATIENT
Start: 2021-04-22 | End: 2021-04-24

## 2021-04-22 RX ORDER — SODIUM CHLORIDE 9 MG/ML
1000 INJECTION INTRAMUSCULAR; INTRAVENOUS; SUBCUTANEOUS
Refills: 0 | Status: DISCONTINUED | OUTPATIENT
Start: 2021-04-22 | End: 2021-04-23

## 2021-04-22 RX ORDER — SODIUM CHLORIDE 9 MG/ML
500 INJECTION INTRAMUSCULAR; INTRAVENOUS; SUBCUTANEOUS ONCE
Refills: 0 | Status: COMPLETED | OUTPATIENT
Start: 2021-04-22 | End: 2021-04-22

## 2021-04-22 RX ORDER — PHENYLEPHRINE HYDROCHLORIDE 10 MG/ML
0.1 INJECTION INTRAVENOUS
Qty: 40 | Refills: 0 | Status: DISCONTINUED | OUTPATIENT
Start: 2021-04-22 | End: 2021-04-23

## 2021-04-22 RX ORDER — HEPARIN SODIUM 5000 [USP'U]/ML
5000 INJECTION INTRAVENOUS; SUBCUTANEOUS EVERY 8 HOURS
Refills: 0 | Status: DISCONTINUED | OUTPATIENT
Start: 2021-04-22 | End: 2021-04-24

## 2021-04-22 RX ADMIN — SODIUM CHLORIDE 100 MILLILITER(S): 9 INJECTION INTRAMUSCULAR; INTRAVENOUS; SUBCUTANEOUS at 13:54

## 2021-04-22 RX ADMIN — CILOSTAZOL 50 MILLIGRAM(S): 100 TABLET ORAL at 19:04

## 2021-04-22 RX ADMIN — ATORVASTATIN CALCIUM 40 MILLIGRAM(S): 80 TABLET, FILM COATED ORAL at 21:36

## 2021-04-22 RX ADMIN — LORATADINE 10 MILLIGRAM(S): 10 TABLET ORAL at 21:36

## 2021-04-22 RX ADMIN — Medication 5 MILLIGRAM(S): at 21:36

## 2021-04-22 RX ADMIN — PHENYLEPHRINE HYDROCHLORIDE 2.15 MICROGRAM(S)/KG/MIN: 10 INJECTION INTRAVENOUS at 11:15

## 2021-04-22 RX ADMIN — Medication 81 MILLIGRAM(S): at 12:48

## 2021-04-22 RX ADMIN — HEPARIN SODIUM 5000 UNIT(S): 5000 INJECTION INTRAVENOUS; SUBCUTANEOUS at 14:32

## 2021-04-22 RX ADMIN — LATANOPROST 1 DROP(S): 0.05 SOLUTION/ DROPS OPHTHALMIC; TOPICAL at 21:37

## 2021-04-22 RX ADMIN — Medication 100 GRAM(S): at 14:48

## 2021-04-22 RX ADMIN — SODIUM CHLORIDE 1000 MILLILITER(S): 9 INJECTION INTRAMUSCULAR; INTRAVENOUS; SUBCUTANEOUS at 10:50

## 2021-04-22 RX ADMIN — HEPARIN SODIUM 5000 UNIT(S): 5000 INJECTION INTRAVENOUS; SUBCUTANEOUS at 21:37

## 2021-04-22 RX ADMIN — Medication 2000 MILLIGRAM(S): at 17:05

## 2021-04-22 NOTE — H&P ADULT - ASSESSMENT
70 F PMH HTN, CAD, PVD PSH L CEA with roof patch (10/14/19-Dr. Lopez), now with worsening R carotid stenosis (>70%)  which has been asymptomatic, presenting for R carotid endarterectomy today. This morning patient reports feeling well without any complaints. 70 F PMH HTN, CAD, PVD PSH L CEA with roof patch (10/14/19-Dr. Lopez), now with worsening R carotid stenosis (>70%)  which has been asymptomatic, presenting for R carotid endarterectomy today. This morning patient reports feeling well without any complaints.    Admit to Vascular Surgery, telemetry vs. ICU pending postop status.   BP control 100-160  Neurochecks as per PACU and telemetry routine  Closely monitor neck for hematoma  IVF/CLD, advance as tolerated   pain control with Tylenol only  NO narcotic pain medication  continue home medication, holding BP meds   DVT ppx  postop labs and EKG  AM labs and EKG

## 2021-04-22 NOTE — CONSULT NOTE ADULT - SUBJECTIVE AND OBJECTIVE BOX
HPI:  70 F PMH HTN, CAD, PVD PSH L CEA  (10/14/19-Dr. Lopez), now with worsening R carotid stenosis (>70%)  which has been asymptomatic, presenting for R carotid endarterectomy today. This morning patient reports feeling well without any complaints. Denies headache, changes in vision or speech, no weakness/numbness or tingling.     Carotid DUS: R:305/52, 3.5 L: 160/35, 0.97  Hgb 13.9  Cr 0.99      PAST MEDICAL & SURGICAL HISTORY:  Carotid artery stenosis  Asthma  Osteoporosis  UTI (urinary tract infection)  HTN (hypertension)  HLD (hyperlipidemia)  Zylclmlo-Nsdz-Hnefsfw syndrome  S/P ectopic pregnancy  cervical  S/P tonsillectomy  Surgery, elective  melanoma removal, back and leg  Surgery, elective  cyst removal left breast  Surgery, elective  left carotid    ROS: See HPI    MEDICATIONS:   Cilostazol 50mg BID  Valsartan 160mg daily   Atorvastatin 40mg daily      Allergies: IV contrast dyes, Shellfish (Rash)  penicillin (Rash)      SOCIAL HISTORY:  Smoke: Never Smoker  EtOH: occasional    FAMILY HISTORY:      PHYSICAL EXAM  Exam:   Neuro: AOX3, calm, NAD. CN II-XII grossly intact without focal weakness or numbness. normal/symmetric strength and sensation bilaterally.   Gen: WD, WN, appropriately groomed.    HEENT: AT, NC  Neck: No JVD, Normal thyroid, NO carotid bruits bilaterally.   Res: Nml BS, CTAB, no wheezes/rhales or rhonchi.   CV: Normal HS, RRR, no MRG  Ankle Edema: No  Varicose Veins: No  Venous Stasis: No  Abd: soft, ND, NT, no masses or organomegaly appreciated  Musculoskeletal: sensation grossly intact, strength 5/5, FROM bilaterally          (22 Apr 2021 07:24)    SICU addendum:      SICU team was consulted for hemodynamic status monitoring. The patient SBPs were in 90s in the PACU, started on Phenylephrine by vascular team.  Denies any chest pain, abdominal pain, dizziness neck pain, changes in voice, nausea, vomiting, fever or chills.     PAST MEDICAL & SURGICAL HISTORY:  Carotid artery stenosis    Asthma    Osteoporosis    UTI (urinary tract infection)    HTN (hypertension)    HLD (hyperlipidemia)    Czmjxdkg-Vprb-Qbrvsxn syndrome    S/P ectopic pregnancy  cervical    S/P tonsillectomy    Surgery, elective  melanoma removal, back and leg    Surgery, elective  cyst removal left breast    Surgery, elective  left carotid        MEDICATIONS  (STANDING):  aspirin enteric coated 81 milliGRAM(s) Oral daily  atorvastatin 40 milliGRAM(s) Oral at bedtime  ceFAZolin  Injectable. 2000 milliGRAM(s) IV Push every 8 hours  cilostazol 50 milliGRAM(s) Oral two times a day  heparin   Injectable 5000 Unit(s) SubCutaneous every 8 hours  latanoprost 0.005% Ophthalmic Solution 1 Drop(s) Both EYES at bedtime  loratadine 10 milliGRAM(s) Oral daily  melatonin 5 milliGRAM(s) Oral at bedtime  phenylephrine    Infusion 0.1 MICROgram(s)/kG/Min (2.15 mL/Hr) IV Continuous <Continuous>  sodium chloride 0.9%. 1000 milliLiter(s) (100 mL/Hr) IV Continuous <Continuous>    MEDICATIONS  (PRN):  senna 2 Tablet(s) Oral daily PRN Constipation      Allergies    IV contrast dyes, Shellfish (Rash)  penicillin (Rash)      ICU Vital Signs Last 24 Hrs  T(C): 36.7 (22 Apr 2021 17:15), Max: 36.7 (22 Apr 2021 17:15)  T(F): 98.1 (22 Apr 2021 17:15), Max: 98.1 (22 Apr 2021 17:15)  HR: 64 (22 Apr 2021 18:20) (56 - 71)  BP: 119/56 (22 Apr 2021 18:20) (87/54 - 119/80)  BP(mean): 82 (22 Apr 2021 17:34) (67 - 92)  ABP: 111/46 (22 Apr 2021 18:20) (81/25 - 148/72)  ABP(mean): 70 (22 Apr 2021 18:20) (52 - 101)  RR: 18 (22 Apr 2021 18:20) (11 - 21)  SpO2: 98% (22 Apr 2021 18:20) (94% - 100%)      I&O's Summary    22 Apr 2021 07:01  -  22 Apr 2021 18:58  --------------------------------------------------------  IN: 318 mL / OUT: 1450 mL / NET: -1132 mL        Medina:	  [x ] None	[ ] Daily Medina Order Placed	   Indication:	  [ ] Strict I and O's    [ ] Obstruction     [ ] Incontinence + Stage 3 or 4 Decubitus  Central Line:  [x ] None	   [ ]  Medication / TPN Administration       Physical Exam:  General: NAD  HEENT: Right facial drooping (preop), dressing over neck incisions at R side, minimal SS, drain is SS and to suction, no neck hematoma or hoarseness of voice,  Pulmonary: Nonlabored breathing, no respiratory distress, CTA-B  Cardiovascular: NSR, no murmurs  Abdominal: soft, NT/ND, +BS, no organomegaly  Extremities: WWP, 5/5 strength x 4, no clubbing/cyanosis/edema  Neuro: A/O x3, CNs II-XII grossly intact, normal motor/sensation, no focal deficits    Lines/tubes/drains:    Vent settings:      LABS:                        11.2   7.06  )-----------( 226      ( 22 Apr 2021 10:52 )             33.8     04-22    136  |  103  |  21  ----------------------------<  162<H>  4.4   |  23  |  0.86    Ca    8.8      22 Apr 2021 10:52  Phos  3.2     04-22  Mg     1.6     04-22      PT/INR - ( 22 Apr 2021 10:52 )   PT: 12.7 sec;   INR: 1.06          PTT - ( 22 Apr 2021 10:52 )  PTT:32.6 sec    CAPILLARY BLOOD GLUCOSE            Cultures:      RADIOLOGY & ADDITIONAL STUDIES:     HPI:  70 F PMH HTN, CAD, PVD PSH L CEA  (10/14/19-Dr. Lopez), now with worsening R carotid stenosis (>70%)  which has been asymptomatic, presenting for R carotid endarterectomy today. This morning patient reports feeling well without any complaints. Denies headache, changes in vision or speech, no weakness/numbness or tingling.     Carotid DUS: R:305/52, 3.5 L: 160/35, 0.97  Hgb 13.9  Cr 0.99      PAST MEDICAL & SURGICAL HISTORY:  Carotid artery stenosis  Asthma  Osteoporosis  UTI (urinary tract infection)  HTN (hypertension)  HLD (hyperlipidemia)  Jfhjduku-Gyvj-Itcfuzg syndrome  S/P ectopic pregnancy  cervical  S/P tonsillectomy  Surgery, elective  melanoma removal, back and leg  Surgery, elective  cyst removal left breast  Surgery, elective  left carotid    ROS: See HPI    MEDICATIONS:   Cilostazol 50mg BID  Valsartan 160mg daily   Atorvastatin 40mg daily      Allergies: IV contrast dyes, Shellfish (Rash)  penicillin (Rash)      SOCIAL HISTORY:  Smoke: Never Smoker  EtOH: occasional    FAMILY HISTORY:      PHYSICAL EXAM  Exam:   Neuro: AOX3, calm, NAD. CN II-XII grossly intact without focal weakness or numbness. normal/symmetric strength and sensation bilaterally.   Gen: WD, WN, appropriately groomed.    HEENT: AT, NC  Neck: No JVD, Normal thyroid, NO carotid bruits bilaterally.   Res: Nml BS, CTAB, no wheezes/rales or rhonchi.   CV: Normal HS, RRR, no MRG  Ankle Edema: No  Varicose Veins: No  Venous Stasis: No  Abd: soft, ND, NT, no masses or organomegaly appreciated  Musculoskeletal: sensation grossly intact, strength 5/5, FROM bilaterally   Skin: no rash         (22 Apr 2021 07:24)    SICU addendum:      SICU team was consulted for hemodynamic status monitoring. The patient SBPs were in 90s in the PACU, started on Phenylephrine by vascular team.  Denies any chest pain, abdominal pain, dizziness neck pain, changes in voice, nausea, vomiting, fever or chills.     PAST MEDICAL & SURGICAL HISTORY:  Carotid artery stenosis    Asthma    Osteoporosis    UTI (urinary tract infection)    HTN (hypertension)    HLD (hyperlipidemia)    Ywqvyhxg-Dmwu-Xnpxkyy syndrome    S/P ectopic pregnancy  cervical    S/P tonsillectomy    Surgery, elective  melanoma removal, back and leg    Surgery, elective  cyst removal left breast    Surgery, elective  left carotid        MEDICATIONS  (STANDING):  aspirin enteric coated 81 milliGRAM(s) Oral daily  atorvastatin 40 milliGRAM(s) Oral at bedtime  ceFAZolin  Injectable. 2000 milliGRAM(s) IV Push every 8 hours  cilostazol 50 milliGRAM(s) Oral two times a day  heparin   Injectable 5000 Unit(s) SubCutaneous every 8 hours  latanoprost 0.005% Ophthalmic Solution 1 Drop(s) Both EYES at bedtime  loratadine 10 milliGRAM(s) Oral daily  melatonin 5 milliGRAM(s) Oral at bedtime  phenylephrine    Infusion 0.1 MICROgram(s)/kG/Min (2.15 mL/Hr) IV Continuous <Continuous>  sodium chloride 0.9%. 1000 milliLiter(s) (100 mL/Hr) IV Continuous <Continuous>    MEDICATIONS  (PRN):  senna 2 Tablet(s) Oral daily PRN Constipation      Allergies    IV contrast dyes, Shellfish (Rash)  penicillin (Rash)      ICU Vital Signs Last 24 Hrs  T(C): 36.7 (22 Apr 2021 17:15), Max: 36.7 (22 Apr 2021 17:15)  T(F): 98.1 (22 Apr 2021 17:15), Max: 98.1 (22 Apr 2021 17:15)  HR: 64 (22 Apr 2021 18:20) (56 - 71)  BP: 119/56 (22 Apr 2021 18:20) (87/54 - 119/80)  BP(mean): 82 (22 Apr 2021 17:34) (67 - 92)  ABP: 111/46 (22 Apr 2021 18:20) (81/25 - 148/72)  ABP(mean): 70 (22 Apr 2021 18:20) (52 - 101)  RR: 18 (22 Apr 2021 18:20) (11 - 21)  SpO2: 98% (22 Apr 2021 18:20) (94% - 100%)      I&O's Summary    22 Apr 2021 07:01  -  22 Apr 2021 18:58  --------------------------------------------------------  IN: 318 mL / OUT: 1450 mL / NET: -1132 mL        Medina:	  [x ] None	[ ] Daily Medina Order Placed	   Indication:	  [ ] Strict I and O's    [ ] Obstruction     [ ] Incontinence + Stage 3 or 4 Decubitus  Central Line:  [x ] None	   [ ]  Medication / TPN Administration       Physical Exam:  General: NAD  HEENT: Right facial drooping (preop), dressing over neck incisions at R side, minimal SS, drain is SS and to suction, no neck hematoma or hoarseness of voice,  Pulmonary: Nonlabored breathing, no respiratory distress, CTA-B  Cardiovascular: NSR, no murmurs  Abdominal: soft, NT/ND, +BS, no organomegaly  Extremities: WWP, 5/5 strength x 4, no clubbing/cyanosis/edema  Neuro: A/O x3, CNs II-XII grossly intact, normal motor/sensation, no focal deficits  Skin: no rash    Lines/tubes/drains:    Vent settings:      LABS:                        11.2   7.06  )-----------( 226      ( 22 Apr 2021 10:52 )             33.8     04-22    136  |  103  |  21  ----------------------------<  162<H>  4.4   |  23  |  0.86    Ca    8.8      22 Apr 2021 10:52  Phos  3.2     04-22  Mg     1.6     04-22      PT/INR - ( 22 Apr 2021 10:52 )   PT: 12.7 sec;   INR: 1.06          PTT - ( 22 Apr 2021 10:52 )  PTT:32.6 sec    CAPILLARY BLOOD GLUCOSE            Cultures:      RADIOLOGY & ADDITIONAL STUDIES:

## 2021-04-22 NOTE — H&P ADULT - DOES THIS PATIENT HAVE A HISTORY OF OR HAS BEEN DX WITH HEART FAILURE?
Notified this was faxed with confirmation
Patient was contacted by the [facility] today they are requesting the forms they faxed over yesterday called the Attending Process Report it should be about 3 pages please fax to [phone]
no

## 2021-04-22 NOTE — PROGRESS NOTE ADULT - SUBJECTIVE AND OBJECTIVE BOX
Vascular Surgery Post-Op Note    Procedure: Right CEA with patch angioplasty under EEG    Diagnosis/Indication: Right carotid stenosis    Surgeon: May Lopez    S: Seen and evaluated in PACU. Resting comfortably in bed. Denies any HA or changes in vision. Denies weakness or pain in extremities. Tolerating sips of water without dysphagia.     O:  T(C): 36.3 (04-22-21 @ 10:24), Max: 36.3 (04-22-21 @ 10:24)  T(F): 97.3 (04-22-21 @ 10:24), Max: 97.3 (04-22-21 @ 10:24)  HR: 64 (04-22-21 @ 12:53) (60 - 71)  BP: 102/50 (04-22-21 @ 12:53) (87/54 - 118/56)  RR: 16 (04-22-21 @ 12:53) (11 - 20)  SpO2: 99% (04-22-21 @ 12:53) (94% - 99%)  Wt(kg): --                        11.2   7.06  )-----------( 226      ( 22 Apr 2021 10:52 )             33.8     04-22    136  |  103  |  21  ----------------------------<  162<H>  4.4   |  23  |  0.86    Ca    8.8      22 Apr 2021 10:52  Phos  3.2     04-22  Mg     1.6     04-22        Gen: NAD, resting comfortably in bed  C/V: NSR  Neck: Trachea midline. Right neck incision with dressing intact, minimal strikethrough on dressing, soft without surrounding ecchymosis or hematoma. AGUILA to wall suction with minimal SSF.  Pulm: Nonlabored breathing, no respiratory distress  Extrem: WWP, no calf edema, SCDs in place. 5/5  strength in UEs b/l. B/l LE with 5/5 dorsi and plantarflexion.   CN II-XII: Grossly intact. Mild right side facial paralysis.       A/P: 70yFemale s/p above procedure    CLD/ @cc/hr  Start ASA  Phenylephrine gtt for SBP goal 110-160; now off  Periop Ancef  Pain/nausea control  C/w home cilostazol, holding home valsartan  DVT ppx: S[facility]  Dispo plan: inpatient

## 2021-04-22 NOTE — H&P ADULT - NSICDXPASTSURGICALHX_GEN_ALL_CORE_FT
PAST SURGICAL HISTORY:  S/P ectopic pregnancy cervical    S/P tonsillectomy     Surgery, elective melanoma removal, back and leg    Surgery, elective cyst removal left breast    Surgery, elective left carotid

## 2021-04-22 NOTE — H&P ADULT - HISTORY OF PRESENT ILLNESS
70 F PMH HTN, PVD PSH L CEA (10/14/19-Dr. Lopez), presenting for R carotid endarderectomy today 70 F PMH HTN, CAD, PVD PSH L CEA with roof patch (10/14/19-Dr. Lopez), now with worsening R carotid stenosis (>70%)  which has been asymptomatic, presenting for R carotid endarterectomy today. This morning patient reports feeling well without any complaints. 70 F PMH HTN, CAD, PVD PSH L CEA  (10/14/19-Dr. Lopez), now with worsening R carotid stenosis (>70%)  which has been asymptomatic, presenting for R carotid endarterectomy today. This morning patient reports feeling well without any complaints. Denies headache, changes in vision or speech, no weakness/numbness or tingling.     Carotid DUS: R:305/52, 3.5 L: 160/35, 0.97  Hgb 13.9  Cr 0.99    never smoker     70 F PMH HTN, CAD, PVD PSH L CEA  (10/14/19-Dr. Lopez), now with worsening R carotid stenosis (>70%)  which has been asymptomatic, presenting for R carotid endarterectomy today. This morning patient reports feeling well without any complaints. Denies headache, changes in vision or speech, no weakness/numbness or tingling.     Carotid DUS: R:305/52, 3.5 L: 160/35, 0.97  Hgb 13.9  Cr 0.99      PAST MEDICAL & SURGICAL HISTORY:  Carotid artery stenosis  Asthma  Osteoporosis  UTI (urinary tract infection)  HTN (hypertension)  HLD (hyperlipidemia)  Jbaicnej-Gxxo-Mujzjej syndrome  S/P ectopic pregnancy  cervical  S/P tonsillectomy  Surgery, elective  melanoma removal, back and leg  Surgery, elective  cyst removal left breast  Surgery, elective  left carotid    ROS: See HPI    MEDICATIONS:   Cilostazol 50mg BID  Valsartan 160mg daily   Atorvastatin 40mg daily      Allergies: IV contrast dyes, Shellfish (Rash)  penicillin (Rash)      SOCIAL HISTORY:  Smoke: Never Smoker  EtOH: occasional    FAMILY HISTORY:      PHYSICAL EXAM  Exam:   Neuro: AOX3, calm, NAD. CN II-XII grossly intact without focal weakness or numbness. normal/symmetric strength and sensation bilaterally.   Gen: WD, WN, appropriately groomed.    HEENT: AT, NC  Neck: No JVD, Normal thyroid, NO carotid bruits bilaterally.   Res: Nml BS, CTAB, no wheezes/rhales or rhonchi.   CV: Normal HS, RRR, no MRG  Ankle Edema: No  Varicose Veins: No  Venous Stasis: No  Abd: soft, ND, NT, no masses or organomegaly appreciated  Musculoskeletal: sensation grossly intact, strength 5/5, FROM bilaterally

## 2021-04-22 NOTE — H&P ADULT - NSICDXPASTMEDICALHX_GEN_ALL_CORE_FT
PAST MEDICAL HISTORY:  Asthma     Carotid artery stenosis     HLD (hyperlipidemia)     HTN (hypertension)     Osteoporosis     Ohqhjwvc-Dhmz-Vkutrqp syndrome     UTI (urinary tract infection)

## 2021-04-23 ENCOUNTER — TRANSCRIPTION ENCOUNTER (OUTPATIENT)
Age: 71
End: 2021-04-23

## 2021-04-23 LAB
ANION GAP SERPL CALC-SCNC: 6 MMOL/L — SIGNIFICANT CHANGE UP (ref 5–17)
BUN SERPL-MCNC: 15 MG/DL — SIGNIFICANT CHANGE UP (ref 7–23)
CALCIUM SERPL-MCNC: 8.6 MG/DL — SIGNIFICANT CHANGE UP (ref 8.4–10.5)
CHLORIDE SERPL-SCNC: 109 MMOL/L — HIGH (ref 96–108)
CO2 SERPL-SCNC: 26 MMOL/L — SIGNIFICANT CHANGE UP (ref 22–31)
COVID-19 SPIKE DOMAIN AB INTERP: POSITIVE
COVID-19 SPIKE DOMAIN ANTIBODY RESULT: >250 U/ML — HIGH
CREAT SERPL-MCNC: 0.85 MG/DL — SIGNIFICANT CHANGE UP (ref 0.5–1.3)
GLUCOSE SERPL-MCNC: 99 MG/DL — SIGNIFICANT CHANGE UP (ref 70–99)
HCT VFR BLD CALC: 33.2 % — LOW (ref 34.5–45)
HGB BLD-MCNC: 10.9 G/DL — LOW (ref 11.5–15.5)
MAGNESIUM SERPL-MCNC: 2.2 MG/DL — SIGNIFICANT CHANGE UP (ref 1.6–2.6)
MCHC RBC-ENTMCNC: 32.8 GM/DL — SIGNIFICANT CHANGE UP (ref 32–36)
MCHC RBC-ENTMCNC: 33.1 PG — SIGNIFICANT CHANGE UP (ref 27–34)
MCV RBC AUTO: 100.9 FL — HIGH (ref 80–100)
NRBC # BLD: 0 /100 WBCS — SIGNIFICANT CHANGE UP (ref 0–0)
PHOSPHATE SERPL-MCNC: 3.5 MG/DL — SIGNIFICANT CHANGE UP (ref 2.5–4.5)
PLATELET # BLD AUTO: 219 K/UL — SIGNIFICANT CHANGE UP (ref 150–400)
POTASSIUM SERPL-MCNC: 4.8 MMOL/L — SIGNIFICANT CHANGE UP (ref 3.5–5.3)
POTASSIUM SERPL-SCNC: 4.8 MMOL/L — SIGNIFICANT CHANGE UP (ref 3.5–5.3)
RBC # BLD: 3.29 M/UL — LOW (ref 3.8–5.2)
RBC # FLD: 13.9 % — SIGNIFICANT CHANGE UP (ref 10.3–14.5)
SARS-COV-2 IGG+IGM SERPL QL IA: >250 U/ML — HIGH
SARS-COV-2 IGG+IGM SERPL QL IA: POSITIVE
SODIUM SERPL-SCNC: 141 MMOL/L — SIGNIFICANT CHANGE UP (ref 135–145)
WBC # BLD: 9.49 K/UL — SIGNIFICANT CHANGE UP (ref 3.8–10.5)
WBC # FLD AUTO: 9.49 K/UL — SIGNIFICANT CHANGE UP (ref 3.8–10.5)

## 2021-04-23 PROCEDURE — 99232 SBSQ HOSP IP/OBS MODERATE 35: CPT | Mod: GC

## 2021-04-23 RX ORDER — VALSARTAN 80 MG/1
1 TABLET ORAL
Qty: 0 | Refills: 0 | DISCHARGE

## 2021-04-23 RX ORDER — ASPIRIN/CALCIUM CARB/MAGNESIUM 324 MG
1 TABLET ORAL
Qty: 30 | Refills: 0
Start: 2021-04-23 | End: 2021-05-22

## 2021-04-23 RX ORDER — POLYETHYLENE GLYCOL 3350 17 G/17G
17 POWDER, FOR SOLUTION ORAL ONCE
Refills: 0 | Status: COMPLETED | OUTPATIENT
Start: 2021-04-23 | End: 2021-04-23

## 2021-04-23 RX ORDER — TAMSULOSIN HYDROCHLORIDE 0.4 MG/1
0.4 CAPSULE ORAL ONCE
Refills: 0 | Status: COMPLETED | OUTPATIENT
Start: 2021-04-23 | End: 2021-04-23

## 2021-04-23 RX ADMIN — TAMSULOSIN HYDROCHLORIDE 0.4 MILLIGRAM(S): 0.4 CAPSULE ORAL at 14:59

## 2021-04-23 RX ADMIN — HEPARIN SODIUM 5000 UNIT(S): 5000 INJECTION INTRAVENOUS; SUBCUTANEOUS at 06:33

## 2021-04-23 RX ADMIN — HEPARIN SODIUM 5000 UNIT(S): 5000 INJECTION INTRAVENOUS; SUBCUTANEOUS at 14:00

## 2021-04-23 RX ADMIN — ATORVASTATIN CALCIUM 40 MILLIGRAM(S): 80 TABLET, FILM COATED ORAL at 21:17

## 2021-04-23 RX ADMIN — SENNA PLUS 2 TABLET(S): 8.6 TABLET ORAL at 22:09

## 2021-04-23 RX ADMIN — HEPARIN SODIUM 5000 UNIT(S): 5000 INJECTION INTRAVENOUS; SUBCUTANEOUS at 21:17

## 2021-04-23 RX ADMIN — CILOSTAZOL 50 MILLIGRAM(S): 100 TABLET ORAL at 20:48

## 2021-04-23 RX ADMIN — POLYETHYLENE GLYCOL 3350 17 GRAM(S): 17 POWDER, FOR SOLUTION ORAL at 22:30

## 2021-04-23 RX ADMIN — Medication 2000 MILLIGRAM(S): at 07:48

## 2021-04-23 RX ADMIN — Medication 10 MILLIGRAM(S): at 23:54

## 2021-04-23 RX ADMIN — CILOSTAZOL 50 MILLIGRAM(S): 100 TABLET ORAL at 07:11

## 2021-04-23 RX ADMIN — Medication 81 MILLIGRAM(S): at 15:00

## 2021-04-23 RX ADMIN — Medication 2000 MILLIGRAM(S): at 00:55

## 2021-04-23 RX ADMIN — LATANOPROST 1 DROP(S): 0.05 SOLUTION/ DROPS OPHTHALMIC; TOPICAL at 22:43

## 2021-04-23 RX ADMIN — LORATADINE 10 MILLIGRAM(S): 10 TABLET ORAL at 14:59

## 2021-04-23 NOTE — PROGRESS NOTE ADULT - ASSESSMENT
70 F PMH HTN, CAD, PVD PSH L CEA  (10/14/19-Dr. Lopez), now with worsening R carotid stenosis (>70%)  which has been asymptomatic, Now s/p R carotid endarterectomy with post op Hypotension requiring phenylephrine.     Neuro: None Tylenol prn   heent: Latanoprost, Claritin  CV: Hypotensive cont Phenylephrine  PRN for SBP100-160 NS @100 ASA81 Pletal   Pulm: Satting well on NC  GI: DASH diet   : Voids TOV @2am   ID: Ancef x3( 4/22-)   Endo: ISS  PPx: SQH   Lines: [place]( 4/22-) PIV  Wounds: Neck incision AGUILA to wall suction   PT/OT: Not ordered   70 F PMH HTN, CAD, PVD PSH L CEA  (10/14/19-Dr. Lopez), now with worsening R carotid stenosis (>70%)  which has been asymptomatic, Now s/p R carotid endarterectomy with post op Hypotension requiring phenylephrine.     Neuro: None Tylenol prn   heent: Latanoprost, Claritin  CV:  Today hemodynamically stable, Discontinue Phenylephrine. NS @100 ASA81 Pletal   Pulm: Satting well   GI: DASH diet   : failed 2am TOV, repeat TOV at 7 am pending  ID: Ancef x3( 4/22-)   Endo: ISS  PPx: SQ[facility]   Lines: [place]( 4/22-) PIV  Wounds: Neck incision, AGUILA removed  PT/OT: Not ordered   70 F PMH HTN, CAD, PVD PSH L CEA  (10/14/19-Dr. Lopez), now with worsening R carotid stenosis (>70%)  which has been asymptomatic, Now s/p R carotid endarterectomy with post op Hypotension requiring phenylephrine.     Neuro: None Tylenol prn   heent: Latanoprost, Claritin  CV: normotensive  Pulm: Satting well on NC  GI: DASH diet   : TOV 4/24 am if fails d/c home with leg bag,  ID: Ancef x3( 4/22-4/23)   Endo: ISS  PPx: SQ[facility]   Lines: [place]( 4/22-4/23) PIV  Wounds: Neck incision AGUILA to wall suction   PT/OT: Not ordered  Dispo: SDU to tele

## 2021-04-23 NOTE — DISCHARGE NOTE PROVIDER - NSDCCPCAREPLAN_GEN_ALL_CORE_FT
PRINCIPAL DISCHARGE DIAGNOSIS  Diagnosis: Stenosis of right carotid artery  Assessment and Plan of Treatment:       SECONDARY DISCHARGE DIAGNOSES  Diagnosis: Extvtgny-Wsrs-Jtjyubp syndrome  Assessment and Plan of Treatment:     Diagnosis: HLD (hyperlipidemia)  Assessment and Plan of Treatment:     Diagnosis: HTN (hypertension)  Assessment and Plan of Treatment:     Diagnosis: UTI (urinary tract infection)  Assessment and Plan of Treatment:     Diagnosis: Osteoporosis  Assessment and Plan of Treatment:     Diagnosis: Asthma  Assessment and Plan of Treatment:     Diagnosis: Carotid artery stenosis  Assessment and Plan of Treatment:     Diagnosis: CAD (coronary artery disease)  Assessment and Plan of Treatment:      PRINCIPAL DISCHARGE DIAGNOSIS  Diagnosis: Stenosis of right carotid artery  Assessment and Plan of Treatment:       SECONDARY DISCHARGE DIAGNOSES  Diagnosis: Yakbsely-Pvuq-Jrcdlfe syndrome  Assessment and Plan of Treatment:     Diagnosis: HLD (hyperlipidemia)  Assessment and Plan of Treatment:     Diagnosis: HTN (hypertension)  Assessment and Plan of Treatment:     Diagnosis: UTI (urinary tract infection)  Assessment and Plan of Treatment: urinary retention    Diagnosis: Osteoporosis  Assessment and Plan of Treatment:     Diagnosis: Asthma  Assessment and Plan of Treatment:     Diagnosis: Carotid artery stenosis  Assessment and Plan of Treatment:     Diagnosis: CAD (coronary artery disease)  Assessment and Plan of Treatment:

## 2021-04-23 NOTE — PROGRESS NOTE ADULT - SUBJECTIVE AND OBJECTIVE BOX
24 hr events:    SUBJECTIVE:  MEDICATIONS  (STANDING):  aspirin enteric coated 81 milliGRAM(s) Oral daily  atorvastatin 40 milliGRAM(s) Oral at bedtime  ceFAZolin  Injectable. 2000 milliGRAM(s) IV Push every 8 hours  cilostazol 50 milliGRAM(s) Oral two times a day  heparin   Injectable 5000 Unit(s) SubCutaneous every 8 hours  latanoprost 0.005% Ophthalmic Solution 1 Drop(s) Both EYES at bedtime  loratadine 10 milliGRAM(s) Oral daily  melatonin 5 milliGRAM(s) Oral at bedtime  phenylephrine    Infusion 0.1 MICROgram(s)/kG/Min (2.15 mL/Hr) IV Continuous <Continuous>  sodium chloride 0.9%. 1000 milliLiter(s) (100 mL/Hr) IV Continuous <Continuous>    MEDICATIONS  (PRN):  senna 2 Tablet(s) Oral daily PRN Constipation      Medina:	  [ ] None	[ ] Daily Medina Order Placed	   Indication:	  [ ] Strict I and O's    [ ] Obstruction     [ ] Incontinence + Stage 3 or 4 Decubitus  Central Line:  [ ] None	   [ ]  Medication / TPN Administration     [ ] No Peripheral IV     ICU Vital Signs Last 24 Hrs  T(C): 36.4 (22 Apr 2021 22:00), Max: 36.7 (22 Apr 2021 17:15)  T(F): 97.6 (22 Apr 2021 22:00), Max: 98.1 (22 Apr 2021 17:15)  HR: 61 (23 Apr 2021 03:15) (56 - 71)  BP: 111/57 (23 Apr 2021 03:15) (87/54 - 122/59)  BP(mean): 80 (23 Apr 2021 03:15) (67 - 92)  ABP: 48/48 (23 Apr 2021 01:15) (48/48 - 148/72)  ABP(mean): 48 (23 Apr 2021 01:15) (48 - 101)  RR: 16 (23 Apr 2021 03:15) (11 - 21)  SpO2: 96% (23 Apr 2021 03:15) (94% - 100%)      PHYSICAL EXAM  Exam:   Neuro: AOX3, calm, NAD. CN II-XII grossly intact without focal weakness or numbness. normal/symmetric strength and sensation bilaterally.   Gen: WD, WN, appropriately groomed.    HEENT: AT, NC  Neck: No JVD, Normal thyroid, NO carotid bruits bilaterally.   Res: Nml BS, CTAB, no wheezes/rales or rhonchi.   CV: Normal HS, RRR, no MRG  Ankle Edema: No  Varicose Veins: No  Venous Stasis: No  Abd: soft, ND, NT, no masses or organomegaly appreciated  Musculoskeletal: sensation grossly intact, strength 5/5, FROM bilaterally   Skin: no rash      Lines/tubes/drains:    Vent settings:      I&O's Summary    22 Apr 2021 07:01  -  23 Apr 2021 03:55  --------------------------------------------------------  IN: 1168 mL / OUT: 2050 mL / NET: -882 mL        LABS:                        11.2   7.06  )-----------( 226      ( 22 Apr 2021 10:52 )             33.8     04-22    136  |  103  |  21  ----------------------------<  162<H>  4.4   |  23  |  0.86    Ca    8.8      22 Apr 2021 10:52  Phos  3.2     04-22  Mg     1.6     04-22      PT/INR - ( 22 Apr 2021 10:52 )   PT: 12.7 sec;   INR: 1.06          PTT - ( 22 Apr 2021 10:52 )  PTT:32.6 sec    CAPILLARY BLOOD GLUCOSE            Cultures:    Drips:    RADIOLOGY & ADDITIONAL STUDIES:     SUBJECTIVE: Patient feels well without any complaints. No pain currently. Denies headache, dizziness, weakness, nausea/vomiting, fever/chills.      24 hr events:  ON: straight cath @6pm1.4L. repeat TOV @2am Failed - straight cathed again ( 600) will repeat @7am, Diet Advanced to DASH Diet. Madison not functioning- kept in for am blood draw. 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 POC wnl. Off phenylephrine. AGUILA with SSF. Bladder scan 800 in PACU      MEDICATIONS  (STANDING):  aspirin enteric coated 81 milliGRAM(s) Oral daily  atorvastatin 40 milliGRAM(s) Oral at bedtime  ceFAZolin  Injectable. 2000 milliGRAM(s) IV Push every 8 hours  cilostazol 50 milliGRAM(s) Oral two times a day  heparin   Injectable 5000 Unit(s) SubCutaneous every 8 hours  latanoprost 0.005% Ophthalmic Solution 1 Drop(s) Both EYES at bedtime  loratadine 10 milliGRAM(s) Oral daily  melatonin 5 milliGRAM(s) Oral at bedtime  phenylephrine    Infusion 0.1 MICROgram(s)/kG/Min (2.15 mL/Hr) IV Continuous <Continuous>  sodium chloride 0.9%. 1000 milliLiter(s) (100 mL/Hr) IV Continuous <Continuous>    MEDICATIONS  (PRN):  senna 2 Tablet(s) Oral daily PRN Constipation      Medina:	  [ x] None	[ ] Daily Medina Order Placed	   Indication:	  [ ] Strict I and O's    [ ] Obstruction     [ ] Incontinence + Stage 3 or 4 Decubitus  Central Line:  [x ] None	   [ ]  Medication / TPN Administration     [ ] No Peripheral IV     ICU Vital Signs Last 24 Hrs  T(C): 36.4 (22 Apr 2021 22:00), Max: 36.7 (22 Apr 2021 17:15)  T(F): 97.6 (22 Apr 2021 22:00), Max: 98.1 (22 Apr 2021 17:15)  HR: 61 (23 Apr 2021 03:15) (56 - 71)  BP: 111/57 (23 Apr 2021 03:15) (87/54 - 122/59)  BP(mean): 80 (23 Apr 2021 03:15) (67 - 92)  ABP: 48/48 (23 Apr 2021 01:15) (48/48 - 148/72)  ABP(mean): 48 (23 Apr 2021 01:15) (48 - 101)  RR: 16 (23 Apr 2021 03:15) (11 - 21)  SpO2: 96% (23 Apr 2021 03:15) (94% - 100%)      PHYSICAL EXAM  Exam:   Neuro: AOX3, calm, NAD. CN II-XII grossly intact without focal weakness or numbness. normal/symmetric strength and sensation bilaterally.   Gen: WD, WN, appropriately groomed.    HEENT: EOMI  Neck: No JVD, Normal thyroid, NO carotid bruits bilaterally, dressing over R neck incision w minimal ss, AGUILA drain removed, no neck hematoma or hoarseness of voice  Res: Nml BS, CTAB, no wheezes/rales or rhonchi.   CV: Normal HS, RRR, no MRG  Ankle Edema: No  Varicose Veins: No  Venous Stasis: No  Abd: soft, ND, NT, no masses or organomegaly appreciated  Musculoskeletal: sensation grossly intact, strength 5/5, FROM bilaterally   Skin: no rash      Lines/tubes/drains:    Vent settings:      I&O's Summary    22 Apr 2021 07:01  -  23 Apr 2021 03:55  --------------------------------------------------------  IN: 1168 mL / OUT: 2050 mL / NET: -882 mL        LABS:                        11.2   7.06  )-----------( 226      ( 22 Apr 2021 10:52 )             33.8     04-22    136  |  103  |  21  ----------------------------<  162<H>  4.4   |  23  |  0.86    Ca    8.8      22 Apr 2021 10:52  Phos  3.2     04-22  Mg     1.6     04-22      PT/INR - ( 22 Apr 2021 10:52 )   PT: 12.7 sec;   INR: 1.06          PTT - ( 22 Apr 2021 10:52 )  PTT:32.6 sec    CAPILLARY BLOOD GLUCOSE            Cultures:    Drips:    RADIOLOGY & ADDITIONAL STUDIES:         24 hr events:   ON: straight cath @6pm1.4L. repeat TOV @2am Failed - straight cathed again ( 600) will repeat @7am, Diet Advanced to DASH Diet. Madison not functioning- kept in for am blood draw. 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 POC wnl. Off phenylephrine. AGUILA with SSF. Bladder scan 800 in PACU    SUBJECTIVE: Patient feels well without any complaints. No pain currently. Denies headache, dizziness, weakness, nausea/vomiting, fever/chills       MEDICATIONS  (STANDING):  aspirin enteric coated 81 milliGRAM(s) Oral daily  atorvastatin 40 milliGRAM(s) Oral at bedtime  ceFAZolin  Injectable. 2000 milliGRAM(s) IV Push every 8 hours  cilostazol 50 milliGRAM(s) Oral two times a day  heparin   Injectable 5000 Unit(s) SubCutaneous every 8 hours  latanoprost 0.005% Ophthalmic Solution 1 Drop(s) Both EYES at bedtime  loratadine 10 milliGRAM(s) Oral daily  melatonin 5 milliGRAM(s) Oral at bedtime  phenylephrine    Infusion 0.1 MICROgram(s)/kG/Min (2.15 mL/Hr) IV Continuous <Continuous>  sodium chloride 0.9%. 1000 milliLiter(s) (100 mL/Hr) IV Continuous <Continuous>    MEDICATIONS  (PRN):  senna 2 Tablet(s) Oral daily PRN Constipation      Medina:	  [ ] None	[ ] Daily Medina Order Placed	   Indication:	  [ ] Strict I and O's    [ ] Obstruction     [ ] Incontinence + Stage 3 or 4 Decubitus  Central Line:  [x ] None	   [ ]  Medication / TPN Administration     [ ] No Peripheral IV     ICU Vital Signs Last 24 Hrs  T(C): 36.4 (22 Apr 2021 22:00), Max: 36.7 (22 Apr 2021 17:15)  T(F): 97.6 (22 Apr 2021 22:00), Max: 98.1 (22 Apr 2021 17:15)  HR: 61 (23 Apr 2021 03:15) (56 - 71)  BP: 111/57 (23 Apr 2021 03:15) (87/54 - 122/59)  BP(mean): 80 (23 Apr 2021 03:15) (67 - 92)  ABP: 48/48 (23 Apr 2021 01:15) (48/48 - 148/72)  ABP(mean): 48 (23 Apr 2021 01:15) (48 - 101)  RR: 16 (23 Apr 2021 03:15) (11 - 21)  SpO2: 96% (23 Apr 2021 03:15) (94% - 100%)      PHYSICAL EXAM  Exam:   Neuro: AOX3, calm, NAD. CN II-XII grossly intact without focal weakness or numbness. normal/symmetric strength and sensation bilaterally.  (had a right facial droop preop)  Gen: WD, WN, appropriately groomed.    HEENT: EOMI  Neck: No JVD, Normal thyroid, NO carotid bruits bilaterally, dressing over R neck incision w minimal ss, AGUILA drain removed, no neck hematoma or hoarseness of voice  Res: Nml BS, CTAB, no wheezes/rales or rhonchi.   CV: Normal HS, RRR, no MRG  Ankle Edema: No  Varicose Veins: No  Venous Stasis: No  Abd: soft, ND, NT, no masses or organomegaly appreciated  Musculoskeletal: sensation grossly intact, strength 5/5, FROM bilaterally   Skin: no rash      Lines/tubes/drains:    Vent settings:      I&O's Summary    22 Apr 2021 07:01  -  23 Apr 2021 03:55  --------------------------------------------------------  IN: 1168 mL / OUT: 2050 mL / NET: -882 mL        LABS:                                              10.9   9.49  )-----------( 219      ( 23 Apr 2021 06:40 )             33.2   04-23    141  |  109<H>  |  15  ----------------------------<  99  4.8   |  26  |  0.85    Ca    8.6      23 Apr 2021 06:40  Phos  3.5     04-23  Mg     2.2     04-23    PT/INR - ( 22 Apr 2021 10:52 )   PT: 12.7 sec;   INR: 1.06          PTT - ( 22 Apr 2021 10:52 )  PTT:32.6 sec  CAPILLARY BLOOD GLUCOSE            Cultures:    Drips:    RADIOLOGY & ADDITIONAL STUDIES:

## 2021-04-23 NOTE — DISCHARGE NOTE PROVIDER - NSDCMRMEDTOKEN_GEN_ALL_CORE_FT
atorvastatin 40 mg oral tablet: 1 tab(s) orally once a day  cilostazol 50 mg oral tablet: 1 tab(s) orally 2 times a day  desloratadine 5 mg oral tablet: 1 tab(s) orally once a day  Lumigan 0.01% ophthalmic solution: 1 drop(s) to each affected eye once a day (in the evening)  melatonin 5 mg oral tablet: 2 tab(s) orally once a day (at bedtime)  valsartan 160 mg oral tablet: 1 tab(s) orally once a day   aspirin 81 mg oral delayed release tablet: 1 tab(s) orally once a day  atorvastatin 40 mg oral tablet: 1 tab(s) orally once a day  cilostazol 50 mg oral tablet: 1 tab(s) orally 2 times a day  desloratadine 5 mg oral tablet: 1 tab(s) orally once a day  Lumigan 0.01% ophthalmic solution: 1 drop(s) to each affected eye once a day (in the evening)  melatonin 5 mg oral tablet: 2 tab(s) orally once a day (at bedtime)   aspirin 81 mg oral delayed release tablet: 1 tab(s) orally once a day  atorvastatin 40 mg oral tablet: 1 tab(s) orally once a day  cilostazol 50 mg oral tablet: 1 tab(s) orally 2 times a day  desloratadine 5 mg oral tablet: 1 tab(s) orally once a day  Lumigan 0.01% ophthalmic solution: 1 drop(s) to each affected eye once a day (in the evening)  Macrobid 100 mg oral capsule: 1 cap(s) orally 2 times a day   melatonin 5 mg oral tablet: 2 tab(s) orally once a day (at bedtime)

## 2021-04-23 NOTE — DISCHARGE NOTE PROVIDER - CARE PROVIDER_API CALL
May Lopez)  Surgery; Vascular Surgery  [address] [place] [address]  Phone: [phone]  Fax: [phone]  Follow Up Time: 1 week   May Lopez)  Surgery; Vascular Surgery  [address] [place] [address]  Phone: [phone]  Fax: [phone]  Follow Up Time: 1 week    Herbie Montes)  Urology  [address], [address]  [place] [address]  Phone: [phone]  Fax: [phone]  Follow Up Time: 1 week

## 2021-04-23 NOTE — DISCHARGE NOTE PROVIDER - CARE PROVIDERS DIRECT ADDRESSES
,[email or web address]

## 2021-04-23 NOTE — DISCHARGE NOTE PROVIDER - PROVIDER TOKENS
PROVIDER:[TOKEN:[[id number]],FOLLOWUP:[1 week]] PROVIDER:[TOKEN:[[id number]],FOLLOWUP:[1 week]],PROVIDER:[TOKEN:[[id number]],FOLLOWUP:[1 week]]

## 2021-04-23 NOTE — DISCHARGE NOTE PROVIDER - NSDCFUADDINST_GEN_ALL_CORE_FT
FOLLOW UP:Dr. Lopez in 1 week. Your appointment has been made for _______.   Call the office at  with any questions.  WOUND CARE: You may remove dressing tomorrow, you may shower; soap and water over incision sites. Do not scrub. Pat dry when done. ACTIVITY: Ambulate as tolerated, but no heavy lifting (>10lbs) or strenuous exercise.  NO sharp neck turns. NO driving until you see surgeon in office.    DIET: You may resume regular diet. Call the office if you experience increasing pain, redness, swelling or drainage from incision sites, one sided weakness/numbness, headache that does not resolve with Tylenol, chills or temperature >101.4F.     NEW MEDICATIONS: Aspirin 81mg daily    ADDITIONAL FOLLOW UP AFTER DISCHARGE:    DISCHARGE DESTINATION:  FOLLOW UP:Dr. Lopez in 1 week. Your appointment has been made for _______.   Call the office at  with any questions.    Please do not take your home Valsartan until follow up with your Primary Care Physician.     WOUND CARE: You may remove dressing tomorrow, you may shower; soap and water over incision sites. Do not scrub. Pat dry when done. ACTIVITY: Ambulate as tolerated, but no heavy lifting (>10lbs) or strenuous exercise.  NO sharp neck turns. NO driving until you see surgeon in office.    DIET: You may resume regular diet. Call the office if you experience increasing pain, redness, swelling or drainage from incision sites, one sided weakness/numbness, headache that does not resolve with Tylenol, chills or temperature >101.4F.     NEW MEDICATIONS: Aspirin 81mg daily   FOLLOW UP:Dr. Lopez in 1 week. Your surgeon's office will contact you to schedule an appointment.    Call the office at  with any questions.    Please do not take your home Valsartan until follow up with your Primary Care Physician.     WOUND CARE: You may remove dressing tomorrow, you may shower; soap and water over incision sites. Do not scrub. Pat dry when done. ACTIVITY: Ambulate as tolerated, but no heavy lifting (>10lbs) or strenuous exercise.  NO sharp neck turns. NO driving until you see surgeon in office.    DIET: You may resume regular diet. Call the office if you experience increasing pain, redness, swelling or drainage from incision sites, one sided weakness/numbness, headache that does not resolve with Tylenol, chills or temperature >101.4F.     NEW MEDICATIONS: Aspirin 81mg daily   FOLLOW UP:Dr. Lopez in 1 week. Your surgeon's office will contact you to schedule an appointment.    Call the office at  with any questions.    Please do not take your home Valsartan until follow up with your Primary Care Physician.     WOUND CARE: You may remove dressing tomorrow, you may shower; soap and water over incision sites. Do not scrub. Pat dry when done. ACTIVITY: Ambulate as tolerated, but no heavy lifting (>10lbs) or strenuous exercise.  NO sharp neck turns. NO driving until you see surgeon in office.    DIET: You may resume regular diet. Call the office if you experience increasing pain, redness, swelling or drainage from incision sites, one sided weakness/numbness, headache that does not resolve with Tylenol, chills or temperature >101.4F.     NEW MEDICATIONS: Aspirin 81mg daily and Macrobid 100mg twice daily for the next 5 days.    FOLLOW UP:Dr. Lopez in 1 week. Your surgeon's office will contact you to schedule an appointment.    Call the office at  with any questions.    Please do not take your home Valsartan until follow up with your Primary Care Physician.     WOUND CARE: You may remove dressing tomorrow, you may shower; soap and water over incision sites. Do not scrub. Pat dry when done. ACTIVITY: Ambulate as tolerated, but no heavy lifting (>10lbs) or strenuous exercise.  NO sharp neck turns. NO driving until you see surgeon in office.    DIET: You may resume regular diet. Call the office if you experience increasing pain, redness, swelling or drainage from incision sites, one sided weakness/numbness, headache that does not resolve with Tylenol, chills or temperature >101.4F.     NEW MEDICATIONS: Aspirin 81mg daily and Macrobid 100mg twice daily for the next 5 days.     LEG BAG INSTRUCTIONS:  - Keep the drainage bag below the level of your bladder and off the floor at all times.  - Keep the catheter secured to your thigh to prevent it from moving.  - Don’t lie on your catheter or block the flow of urine in the tubing.  - Shower daily to keep the catheter clean.  - Clean your hands before and after touching the catheter or bag.

## 2021-04-23 NOTE — DISCHARGE NOTE PROVIDER - HOSPITAL COURSE
70 F PMH HTN, CAD, PVD PSH L CEA  (10/14/19-Dr. Lopez), now with worsening R carotid stenosis (>70%)  which has been asymptomatic, presenting for R carotid endarterectomy. Day of surgery patient reports feeling well without any complaints. On 4/22/21 patient underwent right carotid endarterectomy without complications. Post op patient required neosynephrine for hypotension and was admitted to SICU. Patient remained asymptomatic and neurologically intact however went into urinary retention and required a straight cath. On POD#1 her AGUILA drain was removed and her blood pressure normalized and she was weaned off the pressors. She tolerated a diet and ______(passed her TOV?)___. 70 F PMH HTN, CAD, PVD PSH L CEA  (10/14/19-Dr. Lopez), now with worsening R carotid stenosis (>70%)  which has been asymptomatic, presenting for R carotid endarterectomy. Day of surgery patient reports feeling well without any complaints. On 4/22/21 patient underwent right carotid endarterectomy without complications. Post op patient required neosynephrine for hypotension and was admitted to SICU. Patient remained asymptomatic and neurologically intact however went into urinary retention and required a straight cath. On POD#1 her AGUILA drain was removed and her blood pressure normalized and she was weaned off the pressors. She tolerated a diet and passed her trial of void. Patient is to follow up outpatient with Dr. Lopez in 1 week. Lisa Mares is a 70F with a pmh of HTN, CAD, PVD and psh of L CEA (10/14/19-Dr. Lopez), now with worsening R carotid stenosis (>70%) which has been asymptomatic who presented to [facility] on 4/22/21 for R carotid endarterectomy. Risks, alternatives to treatment, and benefits were discussed with the patient and she was agreeable to the procedure. Was taken to the OR and underwent right carotid endarterectomy without complications. Post operatively patient required neosynephrine for hypotension and was admitted to SICU. Patient remained asymptomatic and neurologically intact. Post operative course was notable for urinary retention and required straight catheterizations x3. On POD#1 her AGUILA drain was removed and her blood pressure normalized and she was weaned off the pressors. Patient was stepped down to telemetry floor with vitals wnl, however to due failed TOVs, felix catheter was placed and urology consulted with instructions to follow up outpatient with Dr. Montes with outpatient TOV. On day of discharge patient was tolerating regular diet, pain well controlled with oral analgesics, with vitals and labs wnl. UA was obtained and was negative. Patient is to follow up outpatient with Dr. Lopez in 1 week.

## 2021-04-23 NOTE — DISCHARGE NOTE PROVIDER - NSDCFUADDAPPT_GEN_ALL_CORE_FT
Follow up with your PCP 1 week from discharge.  Follow up with your PCP 1 week from discharge.    Please call Dr. Vinicio Montes's office on monday 4/26/21 at [phone] to schedule and appointment.

## 2021-04-24 ENCOUNTER — TRANSCRIPTION ENCOUNTER (OUTPATIENT)
Age: 71
End: 2021-04-24

## 2021-04-24 VITALS — TEMPERATURE: 98 F

## 2021-04-24 LAB
APPEARANCE UR: CLEAR — SIGNIFICANT CHANGE UP
BILIRUB UR-MCNC: NEGATIVE — SIGNIFICANT CHANGE UP
COLOR SPEC: YELLOW — SIGNIFICANT CHANGE UP
DIFF PNL FLD: NEGATIVE — SIGNIFICANT CHANGE UP
GLUCOSE UR QL: NEGATIVE — SIGNIFICANT CHANGE UP
KETONES UR-MCNC: NEGATIVE — SIGNIFICANT CHANGE UP
LEUKOCYTE ESTERASE UR-ACNC: NEGATIVE — SIGNIFICANT CHANGE UP
NITRITE UR-MCNC: NEGATIVE — SIGNIFICANT CHANGE UP
PH UR: 5.5 — SIGNIFICANT CHANGE UP (ref 5–8)
PROT UR-MCNC: NEGATIVE MG/DL — SIGNIFICANT CHANGE UP
SP GR SPEC: 1.01 — SIGNIFICANT CHANGE UP (ref 1–1.03)
UROBILINOGEN FLD QL: 0.2 E.U./DL — SIGNIFICANT CHANGE UP

## 2021-04-24 PROCEDURE — 86900 BLOOD TYPING SEROLOGIC ABO: CPT

## 2021-04-24 PROCEDURE — 86769 SARS-COV-2 COVID-19 ANTIBODY: CPT

## 2021-04-24 PROCEDURE — 88311 DECALCIFY TISSUE: CPT

## 2021-04-24 PROCEDURE — 86850 RBC ANTIBODY SCREEN: CPT

## 2021-04-24 PROCEDURE — 87086 URINE CULTURE/COLONY COUNT: CPT

## 2021-04-24 PROCEDURE — 85027 COMPLETE CBC AUTOMATED: CPT

## 2021-04-24 PROCEDURE — 84100 ASSAY OF PHOSPHORUS: CPT

## 2021-04-24 PROCEDURE — 51703 INSERT BLADDER CATH COMPLEX: CPT

## 2021-04-24 PROCEDURE — C1889: CPT

## 2021-04-24 PROCEDURE — C9399: CPT

## 2021-04-24 PROCEDURE — 36415 COLL VENOUS BLD VENIPUNCTURE: CPT

## 2021-04-24 PROCEDURE — 85730 THROMBOPLASTIN TIME PARTIAL: CPT

## 2021-04-24 PROCEDURE — 80048 BASIC METABOLIC PNL TOTAL CA: CPT

## 2021-04-24 PROCEDURE — 88304 TISSUE EXAM BY PATHOLOGIST: CPT

## 2021-04-24 PROCEDURE — 85610 PROTHROMBIN TIME: CPT

## 2021-04-24 PROCEDURE — 81003 URINALYSIS AUTO W/O SCOPE: CPT

## 2021-04-24 PROCEDURE — 99255 IP/OBS CONSLTJ NEW/EST HI 80: CPT | Mod: GC

## 2021-04-24 PROCEDURE — 86901 BLOOD TYPING SEROLOGIC RH(D): CPT

## 2021-04-24 PROCEDURE — 83735 ASSAY OF MAGNESIUM: CPT

## 2021-04-24 RX ORDER — NITROFURANTOIN MACROCRYSTAL 50 MG
1 CAPSULE ORAL
Qty: 6 | Refills: 0
Start: 2021-04-24 | End: 2021-04-26

## 2021-04-24 RX ADMIN — CILOSTAZOL 50 MILLIGRAM(S): 100 TABLET ORAL at 05:35

## 2021-04-24 RX ADMIN — Medication 81 MILLIGRAM(S): at 11:21

## 2021-04-24 RX ADMIN — Medication 5 MILLIGRAM(S): at 01:17

## 2021-04-24 RX ADMIN — HEPARIN SODIUM 5000 UNIT(S): 5000 INJECTION INTRAVENOUS; SUBCUTANEOUS at 05:35

## 2021-04-24 RX ADMIN — LORATADINE 10 MILLIGRAM(S): 10 TABLET ORAL at 11:21

## 2021-04-24 NOTE — DISCHARGE NOTE NURSING/CASE MANAGEMENT/SOCIAL WORK - PATIENT PORTAL LINK FT
You can access the FollowMyHealth Patient Portal offered by [facility] by registering at the following website: [email or web address]. By joining [facility]’s FollowMyHealth portal, you will also be able to view your health information using other applications (apps) compatible with our system.

## 2021-04-24 NOTE — PROGRESS NOTE ADULT - SUBJECTIVE AND OBJECTIVE BOX
Medicine Consult Note:     HPI:  70 F PMH HTN, CAD, PVD PSH L CEA now with worsening R carotid stenosis (>70%)  which has been asymptomatic, presenting for R carotid endarterectomy performed , seen by me postop prior to discharge. Post op course c/b hypotension briefly needing pressor and persistent urinary retention, now requiring felix catheter placement, urology also following    No pain at CEA surgical site. Patient denies any dysuria/fevers/chills, intact sensation and strength LE, no loss of bowel control     Patient when seen by me otherwise asymptomatic except for urinary retention. No large BM since procedure but otherwise very regular BMs previously, ambulatory at this time without assist. No hx of previous urinary retention     PAST MEDICAL & SURGICAL HISTORY:  Carotid artery stenosis  Asthma  Osteoporosis  UTI (urinary tract infection)  HTN (hypertension)  HLD (hyperlipidemia)  Hoqftuyo-Puoq-Fvjyhjx syndrome  S/P ectopic pregnancy  cervical  S/P tonsillectomy  Surgery, elective  melanoma removal, back and leg  Surgery, elective  cyst removal left breast  Surgery, elective  left carotid    ROS: See HPI    MEDICATIONS:   Cilostazol 50mg BID  Valsartan 160mg daily   Atorvastatin 40mg daily      Allergies: IV contrast dyes, Shellfish (Rash)  penicillin (Rash)      SOCIAL HISTORY:  Smoke: Never Smoker  EtOH: occasional  Lives with family    FAMILY HISTORY:  Uncle with CAD, grandfather with PAD     Vital Signs Last 24 Hrs  T(F): 97.7 (2021 12:57), Max: 99.7 (2021 09:14)  HR: 88 (2021 08:18) (69 - 88)  BP: 127/59 (2021 08:18) (115/54 - 148/67)  RR: 16 (2021 08:18) (16 - 18)  SpO2: 98% (2021 08:18) (95% - 99%)    PHYSICAL EXAM:  GENERAL: pleasant, NAD  NEURO: AOx3, intact strength and sensation UE and LE  HEAD:  Atraumatic, Normocephalic  EYES: EOMI, conjunctiva and sclera clear  HEENT: R neck CEA site c/d/i   CHEST/LUNG: Clear to auscultation bilaterally; No rales, rhonchi, wheezing, or rubs  HEART: Regular rate and rhythm; S1/S2, No murmurs, rubs, or gallops  ABDOMEN: Soft, Nontender, Nondistended, Normal BS  EXT: No sig le edema  : with felix with clear urine, no hematuria     LABS:                        10.9   9.49  )-----------( 219      ( 2021 06:40 )             33.2     04-23    141  |  109  |  15  ----------------------------<  99  4.8   |  26  |  0.85    Ca    8.6      2021 06:40  Phos  3.5     04-23  Mg     2.2     04-23      PT/INR - ( 2021 10:52 )   PT: 12.7 sec;   INR: 1.06          PTT - ( 2021 10:52 )  PTT:32.6 sec  Urinalysis Basic - ( 2021 09:44 )    Color: Yellow / Appearance: Clear / S.010 / pH: x  Gluc: x / Ketone: NEGATIVE  / Bili: Negative / Urobili: 0.2 E.U./dL   Blood: x / Protein: NEGATIVE mg/dL / Nitrite: NEGATIVE   Leuk Esterase: NEGATIVE / RBC: x / WBC x   Sq Epi: x / Non Sq Epi: x / Bacteria: x      ASSESSMENT AND PLAN: 70 F PMH HTN, CAD, PVD PSH L CEA now with worsening R carotid stenosis (>70%)  which has been asymptomatic, presenting for R carotid endarterectomy performed , post op with hypotension now resolving and urinary retention s/p felix  #Post op hypotension, hx of HTN on ARB- reviewed with primary Dr. Pena, hold BP meds until seen this wed by PCP   #Post op urinary retention likely 2/2 anesthesia- no sig constipation/poor mobility driving it- appreciate urology recs, dc with felix and close urology f/u, continue bowel regimen at home and encouraged mobility   #S/p R CEA- pain control per primary team, and dc on asa/statin    #DVT px: SQH  #Diet: Regular  #Dispo: Home today with close PCP, urology, and vascular f/u    Patient was seen and examined by me at bedside    Greater than 60 minutes spent on total encounter; more than 50% of the visit was spent counseling and/or coordinating care by the attending physician.    Edmundo Green MD [phone]

## 2021-04-24 NOTE — CONSULT NOTE ADULT - ASSESSMENT
70 F PMH HTN, CAD, PVD PSH L CEA  (10/14/19-Dr. Lopez) s/p  R carotid endarectomy 4/22 (POD2) with urinary retention since.       Plan:  -discharge home with felix to legbag  -f/u UA/UCx  -f/u Dr. Montes office for TOV  please call [phone] on monday
70 F PMH HTN, CAD, PVD PSH L CEA  (10/14/19-Dr. Lopez), now with worsening R carotid stenosis (>70%)  which has been asymptomatic, Now s/p R carotid endarterectomy with post op Hypotension requiring phenylephrine.     Neuro: None Tylenol prn   heent: Latanoprost, Claritin  CV: Hypotensive cont Phenylephrine  PRN for SBP100-160 NS @100 ASA81 Pletal   Pulm: Satting well on NC  GI: CLD, can advance to regular if tolerated  : Voids TOV @2am   ID: Ancef x3( 4/22-)   Endo: ISS  PPx: SQH , ASA  Lines: [place]( 4/22-) PIV  Wounds: Neck incision AGUILA to wall suction   PT/OT: Not ordered

## 2021-04-24 NOTE — PROGRESS NOTE ADULT - SUBJECTIVE AND OBJECTIVE BOX
O/N: VSS, voided 200 [id number], voided 400 pvr 850, straight cath 1000 revoved; some distention. Mirilax given per pt request along with suppository  pt had small BM this am voided 150 pvr >500                                                      A/P: 70yFemale s/p above procedure      Start ASA  Pain/nausea control  C/w home cilostazol, holding home valsartan  DVT ppx: SQH  Dispo plan: Dc home once voiding with pvr <200 O/N: VSS, voided 200 [id number], voided 400 pvr 850, straight cath 1000 revoved; some distention. Mirilax given per pt request along with suppository  pt had small BM this am voided 150 pvr >500.    Subjective: Seen and evaluated on telemetry floor. Resting comfortably in bed. Denies any HA, changes in vision, dysphagia, or weakness in extremities. Reports concern 2/2 to urinary retention postoperatively requiring straight catheterizations. Denies any h/o retention in the past. Denies increased urgency or dysuria. Endorses BMx2 this AM following suppository.     aspirin enteric coated 81  cilostazol 50  heparin   Injectable 5000      Allergies    IV contrast dyes, Shellfish (Rash)  penicillin (Rash)    Intolerances        Vital Signs Last 24 Hrs  T(C): 37.6 (24 Apr 2021 09:14), Max: 37.6 (24 Apr 2021 09:14)  T(F): 99.7 (24 Apr 2021 09:14), Max: 99.7 (24 Apr 2021 09:14)  HR: 88 (24 Apr 2021 08:18) (69 - 88)  BP: 127/59 (24 Apr 2021 08:18) (115/54 - 148/67)  BP(mean): 97 (23 Apr 2021 19:27) (97 - 102)  RR: 16 (24 Apr 2021 08:18) (16 - 18)  SpO2: 98% (24 Apr 2021 08:18) (95% - 99%)  I&O's Summary    23 Apr 2021 07:01  -  24 Apr 2021 07:00  --------------------------------------------------------  IN: 540 mL / OUT: 3300 mL / NET: -2760 mL    24 Apr 2021 07:01  -  24 Apr 2021 12:31  --------------------------------------------------------  IN: 240 mL / OUT: 1450 mL / NET: -1210 mL        Physical Exam:  Gen: NAD, resting comfortably in bed  C/V: NSR  Neck: Trachea midline. Right neck incision with dressing intact, minimal strikethrough on dressing, soft without surrounding ecchymosis or hematoma. AGUILA to wall suction with minimal SSF.  Pulm: Nonlabored breathing, no respiratory distress  Extrem: WWP, no calf edema, SCDs in place. 5/5  strength in UEs b/l. B/l LE with 5/5 dorsi and plantarflexion.   CN II-XII: Grossly intact. Mild right side facial paralysis.       LABS:                        10.9   9.49  )-----------( 219      ( 23 Apr 2021 06:40 )             33.2     04-23    141  |  109<H>  |  15  ----------------------------<  99  4.8   |  26  |  0.85    Ca    8.6      23 Apr 2021 06:40  Phos  3.5     04-23  Mg     2.2     04-23          Radiology and Additional Studies:

## 2021-04-24 NOTE — PROGRESS NOTE ADULT - ASSESSMENT
70F w/ pmh of HTN, CAD, PVD and PSH L CEA (10/14/19-Dr. Lopez), now with worsening R carotid stenosis (>70%)  which has been asymptomatic, Now s/p R carotid endarterectomy with post op Hypotension requiring phenylephrine.       Start ASA  Pain/nausea control  C/w home cilostazol, holding home valsartan  DVT ppx: SQH  Dispo plan: Dc home once voiding with pvr <200 70F w/ pmh of HTN, CAD, PVD and PSH L CEA (10/14/19-Dr. Lopez), now with worsening R carotid stenosis (>70%) which has been asymptomatic. Now s/p R carotid endarterectomy (4/22/21) with post op hypotension requiring phenylephrine. Postop course also notable for urinary retention.       Regular diet  Strict I&Os  Failed TOV x3- felix placed  Urology c/s- will follow up with Dr. Montes outpatient  Analgesics PRN  Antiemetics PRN  Start ASA  C/w home cilostazol, holding home valsartan  DVT ppx: SQH  Dispo plan: Dc home w/ leg bag

## 2021-04-24 NOTE — PROGRESS NOTE ADULT - REASON FOR ADMISSION
Right carotid endarterectomy

## 2021-04-24 NOTE — CONSULT NOTE ADULT - SUBJECTIVE AND OBJECTIVE BOX
HPI:  70 F PMH HTN, CAD, PVD PSH L CEA  (10/14/19-Dr. Lopez), now with worsening R carotid stenosis (>70%)  which has been asymptomatic, presenting for R carotid endarterectomy today. This morning patient reports feeling well without any complaints. Denies headache, changes in vision or speech, no weakness/numbness or tingling.     Carotid DUS: R:305/52, 3.5 L: 160/35, 0.97  Hgb 13.9  Cr 0.99     note:  Pt is s/p R carotid endarectomy 4/22 (POD2) with urinary retention since. Failed TOV multiple times with straight cath of larger volumes (1.4L is the max). Today she voided 150cc  and felix placed. She states she hasn't been uncomfortable with these large amounts. Has had surgery before, no issues with retention in the past. Has been OOB and had BM overnight.    PAST MEDICAL & SURGICAL HISTORY:  Carotid artery stenosis  Asthma  Osteoporosis  UTI (urinary tract infection)  HTN (hypertension)  HLD (hyperlipidemia)  Rpdilpcf-Aisd-Fqbcpqp syndrome  S/P ectopic pregnancy  cervical  S/P tonsillectomy  Surgery, elective  melanoma removal, back and leg  Surgery, elective  cyst removal left breast  Surgery, elective  left carotid    ROS: See HPI    MEDICATIONS:   Cilostazol 50mg BID  Valsartan 160mg daily   Atorvastatin 40mg daily      Allergies: IV contrast dyes, Shellfish (Rash)  penicillin (Rash)      SOCIAL HISTORY:  Smoke: Never Smoker  EtOH: occasional    FAMILY HISTORY:      PHYSICAL EXAM  Exam:   Neuro: AOX3, calm, NAD. CN II-XII grossly intact without focal weakness or numbness. normal/symmetric strength and sensation bilaterally.   Gen: WD, WN, appropriately groomed.    HEENT: AT, NC  Neck: No JVD, Normal thyroid, NO carotid bruits bilaterally.   Res: Nml BS, CTAB, no wheezes/rhales or rhonchi.   CV: Normal HS, RRR, no MRG  Ankle Edema: No  Varicose Veins: No  Venous Stasis: No  Abd: soft, ND, NT, no masses or organomegaly appreciated  Musculoskeletal: sensation grossly intact, strength 5/5, FROM bilaterally          (22 Apr 2021 07:24)      Vital Signs Last 24 Hrs  T(C): 37.6 (24 Apr 2021 09:14), Max: 37.6 (24 Apr 2021 09:14)  T(F): 99.7 (24 Apr 2021 09:14), Max: 99.7 (24 Apr 2021 09:14)  HR: 88 (24 Apr 2021 08:18) (69 - 88)  BP: 127/59 (24 Apr 2021 08:18) (115/54 - 148/67)  BP(mean): 97 (23 Apr 2021 19:27) (97 - 102)  RR: 16 (24 Apr 2021 08:18) (16 - 18)  SpO2: 98% (24 Apr 2021 08:18) (95% - 99%)  I&O's Summary    23 Apr 2021 07:01  -  24 Apr 2021 07:00  --------------------------------------------------------  IN: 540 mL / OUT: 3300 mL / NET: -2760 mL    24 Apr 2021 07:01  -  24 Apr 2021 12:12  --------------------------------------------------------  IN: 240 mL / OUT: 1450 mL / NET: -1210 mL        PE:  Gen: awake and alert  Abd: nontender, nondistended  : no suprapubic/CVAT. FC intact draining clear urine      LABS:                        10.9   9.49  )-----------( 219      ( 23 Apr 2021 06:40 )             33.2     04-23    141  |  109<H>  |  15  ----------------------------<  99  4.8   |  26  |  0.85    Ca    8.6      23 Apr 2021 06:40  Phos  3.5     04-23  Mg     2.2     04-23        Cultures      A/P

## 2021-04-25 LAB
CULTURE RESULTS: NO GROWTH — SIGNIFICANT CHANGE UP
SPECIMEN SOURCE: SIGNIFICANT CHANGE UP

## 2021-04-26 ENCOUNTER — APPOINTMENT (OUTPATIENT)
Dept: VASCULAR SURGERY | Facility: CLINIC | Age: 71
End: 2021-04-26
Payer: COMMERCIAL

## 2021-04-26 ENCOUNTER — APPOINTMENT (OUTPATIENT)
Dept: UROLOGY | Facility: CLINIC | Age: 71
End: 2021-04-26
Payer: COMMERCIAL

## 2021-04-26 VITALS
HEART RATE: 81 BPM | OXYGEN SATURATION: 94 % | BODY MASS INDEX: 18.51 KG/M2 | TEMPERATURE: 98.6 F | HEIGHT: 69 IN | WEIGHT: 125 LBS | SYSTOLIC BLOOD PRESSURE: 186 MMHG | DIASTOLIC BLOOD PRESSURE: 91 MMHG

## 2021-04-26 PROBLEM — Q87.89 OTHER SPECIFIED CONGENITAL MALFORMATION SYNDROMES, NOT ELSEWHERE CLASSIFIED: Chronic | Status: ACTIVE | Noted: 2021-04-21

## 2021-04-26 PROCEDURE — 99072 ADDL SUPL MATRL&STAF TM PHE: CPT

## 2021-04-26 PROCEDURE — 51702 INSERT TEMP BLADDER CATH: CPT

## 2021-04-26 PROCEDURE — 99204 OFFICE O/P NEW MOD 45 MIN: CPT | Mod: 25

## 2021-04-26 PROCEDURE — 99024 POSTOP FOLLOW-UP VISIT: CPT

## 2021-04-27 NOTE — PHYSICAL EXAM
[General Appearance - Well Developed] : well developed [Normal Appearance] : normal appearance [General Appearance - In No Acute Distress] : no acute distress [Heart Rate And Rhythm] : Heart rate and rhythm were normal [] : no respiratory distress [Abdomen Soft] : soft [Abdomen Tenderness] : non-tender [Urethral Meatus] : normal urethra [External Female Genitalia] : normal external genitalia [Normal Station and Gait] : the gait and station were normal for the patient's age [Skin Color & Pigmentation] : normal skin color and pigmentation [No Focal Deficits] : no focal deficits [Oriented To Time, Place, And Person] : oriented to person, place, and time [FreeTextEntry1] : No cystocele or rectocele with valsalva

## 2021-04-27 NOTE — HISTORY OF PRESENT ILLNESS
[FreeTextEntry1] : 71 yo female s/p right CEA referred for episode of urinary retention in the hospital.  She had surgery on Thursday and had to be catheterized on 2 occasions for PVRs up to 600 cc despite being able to void 200-300 cc on her own.  She denies having any significant issues voiding prior to her surgery.  She does note having mild increased difficulty voiding after her previous surgery 2 years ago.  She notes that she never voided with a strong stream.\par \par She notes having a hx of recurrent UTIs as a younger woman.  \par \par TOV:\par -300 cc sterile saline instilled in bladder\par -Catheter removed in its entirety\par -Patient unable to void\par -14 Fr Medina catheter replaced (please see separate procedure note), Approximately 450 cc drained.

## 2021-04-27 NOTE — ASSESSMENT
[FreeTextEntry1] : 71 yo female with urinary retention after right carotid endarterectomy.  She has a hx of slow voiding for many years.  It is unknown what her residual urine volumes were before she had her surgery last week.  She may have been chronically carrying high residual urine volumes without knowing it.  She has a de-sensate bladder based on the fact that she had no feeling of having > 400 cc in her bladder before the felix was reinserted \par today.  She is also noted that she is constipated.  I suggested that she take a laxative (i.e. miralax) to treat her constipation.  We will leave the felix in place until Friday.  I will plan to fill her with even more fluid during her active trial of void to see if she needs to be very full before her bladder can attempt to void.

## 2021-04-28 ENCOUNTER — APPOINTMENT (OUTPATIENT)
Dept: INTERNAL MEDICINE | Facility: CLINIC | Age: 71
End: 2021-04-28
Payer: COMMERCIAL

## 2021-04-28 ENCOUNTER — LABORATORY RESULT (OUTPATIENT)
Age: 71
End: 2021-04-28

## 2021-04-28 VITALS
SYSTOLIC BLOOD PRESSURE: 130 MMHG | WEIGHT: 125 LBS | BODY MASS INDEX: 18.51 KG/M2 | HEART RATE: 86 BPM | RESPIRATION RATE: 14 BRPM | DIASTOLIC BLOOD PRESSURE: 80 MMHG | TEMPERATURE: 98.2 F | OXYGEN SATURATION: 95 % | HEIGHT: 69 IN

## 2021-04-28 PROCEDURE — 99214 OFFICE O/P EST MOD 30 MIN: CPT

## 2021-04-28 PROCEDURE — 99072 ADDL SUPL MATRL&STAF TM PHE: CPT

## 2021-04-28 NOTE — PLAN
[FreeTextEntry1] : HTN- stable  continue the valsartan 160mg daily,  rx for the extra 80mg pending\par \par Urinary retention - suspect this is her baseline - will f/up with Dr Montes this Friday for felix removal and TOV.\par \par \par will call with BP readings.

## 2021-04-28 NOTE — ADDENDUM
[FreeTextEntry1] : This note was written by Tamela Schulz on 04/26/2021 acting as scribe for May Negron M.D.\par \par I, May Pérez have read and attest that all the information, medical decision making and discharge instructions within are true and accurate.

## 2021-04-28 NOTE — PHYSICAL EXAM
[Respiratory Effort] : normal respiratory effort [Normal Rate and Rhythm] : normal rate and rhythm [Alert] : alert [Oriented to Person] : oriented to person [Oriented to Place] : oriented to place [Oriented to Time] : oriented to time [Calm] : calm [2+] : left 2+ [Ankle Swelling (On Exam)] : not present [Varicose Veins Of Lower Extremities] : not present [] : not present [Abdomen Tenderness] : ~T ~M No abdominal tenderness [de-identified] : Well appearing  [de-identified] : NC/AT  [de-identified] : R sided neck midline incision healing with no active drainage and no signs of infection.  [de-identified] : 200-300 cc of clear urine in drainage bag.  [de-identified] : FROM [de-identified] : see neck.

## 2021-04-28 NOTE — HISTORY OF PRESENT ILLNESS
[de-identified] : 69 yo f with HTN s/p right CEA\par s/p urinary retention- seen by Dr Montes - failed TOV as outpatient, following up \par \par Took valsartan 160mg for past 2 days.   not taking the 240mg

## 2021-04-28 NOTE — DISCUSSION/SUMMARY
[FreeTextEntry1] : 69 y/o F w/PMH of CAD, HTN, HLD, PAD, high grade L ICA stenosis s/p L CEA with roof patch on 10/2019 and high grade R ICA stenosis s/p R CEA post op c/w urinary retention likely secondary to anesthesia s/p urinary requiring Medina catheter placement presents for post op visit. On exam, R sided neck midline incision healing with no active drainage and no signs of infection. \par \par Plan: \par Discussed findings and treatment options. Patient to see Dr. Montes nephrology to determine further need of urine catheter. No vascular surgery interventions recommended at this time. Pt to f/u here in 2 weeks for R CEA surveillance.

## 2021-04-28 NOTE — REASON FOR VISIT
[de-identified] : SWAPNIL BERRY [de-identified] : 4/24/2021 [de-identified] : 71 y/o F w/PMH of CAD, HTN, HLD, PAD, high grade L ICA stenosis s/p L CEA with roof patch on 10/2019 and high grade R ICA stenosis s/p R CEA post op c/w urinary retention likely secondary to anesthesia s/p urinary requiring Medina catheter placement presents for post op visit. Patient c/o with urinary catheter discomfort and notes 200-300 cc of clear urine. She denies any R sided neck pain, fever, chills. Furthermore, patient will be seeing Dr. Montes to determine further need of urinary cath. \par \par Patient is accompanied by her  today.

## 2021-04-29 ENCOUNTER — LABORATORY RESULT (OUTPATIENT)
Age: 71
End: 2021-04-29

## 2021-04-29 DIAGNOSIS — R33.0 DRUG INDUCED RETENTION OF URINE: ICD-10-CM

## 2021-04-29 DIAGNOSIS — I65.21 OCCLUSION AND STENOSIS OF RIGHT CAROTID ARTERY: ICD-10-CM

## 2021-04-29 DIAGNOSIS — Z91.041 RADIOGRAPHIC DYE ALLERGY STATUS: ICD-10-CM

## 2021-04-29 DIAGNOSIS — E78.5 HYPERLIPIDEMIA, UNSPECIFIED: ICD-10-CM

## 2021-04-29 DIAGNOSIS — N99.89 OTHER POSTPROCEDURAL COMPLICATIONS AND DISORDERS OF GENITOURINARY SYSTEM: ICD-10-CM

## 2021-04-29 DIAGNOSIS — Z91.013 ALLERGY TO SEAFOOD: ICD-10-CM

## 2021-04-29 DIAGNOSIS — I25.10 ATHEROSCLEROTIC HEART DISEASE OF NATIVE CORONARY ARTERY WITHOUT ANGINA PECTORIS: ICD-10-CM

## 2021-04-29 DIAGNOSIS — Z88.0 ALLERGY STATUS TO PENICILLIN: ICD-10-CM

## 2021-04-29 DIAGNOSIS — T41.205A ADVERSE EFFECT OF UNSPECIFIED GENERAL ANESTHETICS, INITIAL ENCOUNTER: ICD-10-CM

## 2021-04-29 DIAGNOSIS — I73.9 PERIPHERAL VASCULAR DISEASE, UNSPECIFIED: ICD-10-CM

## 2021-04-29 DIAGNOSIS — F10.10 ALCOHOL ABUSE, UNCOMPLICATED: ICD-10-CM

## 2021-04-29 DIAGNOSIS — Z85.820 PERSONAL HISTORY OF MALIGNANT MELANOMA OF SKIN: ICD-10-CM

## 2021-04-29 DIAGNOSIS — M81.0 AGE-RELATED OSTEOPOROSIS WITHOUT CURRENT PATHOLOGICAL FRACTURE: ICD-10-CM

## 2021-04-29 DIAGNOSIS — I95.81 POSTPROCEDURAL HYPOTENSION: ICD-10-CM

## 2021-04-29 DIAGNOSIS — I10 ESSENTIAL (PRIMARY) HYPERTENSION: ICD-10-CM

## 2021-04-29 DIAGNOSIS — J45.909 UNSPECIFIED ASTHMA, UNCOMPLICATED: ICD-10-CM
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
## 2021-04-29 DIAGNOSIS — Q87.19 OTHER CONGENITAL MALFORMATION SYNDROMES PREDOMINANTLY ASSOCIATED WITH SHORT STATURE: ICD-10-CM

## 2021-04-30 ENCOUNTER — APPOINTMENT (OUTPATIENT)
Dept: UROLOGY | Facility: CLINIC | Age: 71
End: 2021-04-30
Payer: COMMERCIAL

## 2021-04-30 VITALS
HEART RATE: 99 BPM | OXYGEN SATURATION: 95 % | SYSTOLIC BLOOD PRESSURE: 171 MMHG | TEMPERATURE: 98.4 F | DIASTOLIC BLOOD PRESSURE: 99 MMHG

## 2021-04-30 LAB
APPEARANCE: ABNORMAL
BILIRUBIN URINE: NEGATIVE
BLOOD URINE: ABNORMAL
COLOR: YELLOW
GLUCOSE QUALITATIVE U: NEGATIVE
KETONES URINE: NEGATIVE
LEUKOCYTE ESTERASE URINE: ABNORMAL
NITRITE URINE: NEGATIVE
PH URINE: 7
PROTEIN URINE: ABNORMAL
SPECIFIC GRAVITY URINE: 1.01
UROBILINOGEN URINE: NORMAL

## 2021-04-30 PROCEDURE — 99072 ADDL SUPL MATRL&STAF TM PHE: CPT

## 2021-04-30 PROCEDURE — 51702 INSERT TEMP BLADDER CATH: CPT

## 2021-04-30 PROCEDURE — 99214 OFFICE O/P EST MOD 30 MIN: CPT | Mod: 25

## 2021-04-30 NOTE — ASSESSMENT
[FreeTextEntry1] : 69 yo female with urinary retention after recent surgery.  She may have had a hx of large residual urine volumes that was not previously diagnosed.  She notes a long hx of recurrent UTIs and some trouble with urination of the years.  We replaced the felix today and she was found ot be retaining 750 cc of urine.  Her PCP had sent a Ucx earlier this week and it is preliminarily positive.  he called in 7 days of cipro for her which she will start today. She will return next week for a repeat TOV and if she again fails to void we will perform a renal/bladder US and urodynamics.

## 2021-04-30 NOTE — PHYSICAL EXAM
[General Appearance - Well Developed] : well developed [Normal Appearance] : normal appearance [General Appearance - In No Acute Distress] : no acute distress [Abdomen Soft] : soft [Urethral Meatus] : the meatus of the urethra showed no abnormalities [External Female Genitalia] : normal external genitalia [Skin Color & Pigmentation] : normal skin color and pigmentation [Heart Rate And Rhythm] : Heart rate and rhythm were normal [] : no respiratory distress [Oriented To Time, Place, And Person] : oriented to person, place, and time [Normal Station and Gait] : the gait and station were normal for the patient's age [No Focal Deficits] : no focal deficits

## 2021-04-30 NOTE — HISTORY OF PRESENT ILLNESS
[FreeTextEntry1] : Patient returns for second TOV.  She failed to void on Monday.  She is s/p right CEA.\par \par TOV:\par -240 cc sterile saline instilled in bladder\par -Medina catheter removed in its entirety\par -Patient returned to the offic after 5 hours.  SHe was able to void small volumes.  Her PVR > 500 cc

## 2021-05-05 LAB — SURGICAL PATHOLOGY STUDY: SIGNIFICANT CHANGE UP

## 2021-05-06 ENCOUNTER — APPOINTMENT (OUTPATIENT)
Dept: UROLOGY | Facility: CLINIC | Age: 71
End: 2021-05-06

## 2021-05-07 ENCOUNTER — APPOINTMENT (OUTPATIENT)
Dept: UROLOGY | Facility: CLINIC | Age: 71
End: 2021-05-07
Payer: COMMERCIAL

## 2021-05-07 VITALS — SYSTOLIC BLOOD PRESSURE: 165 MMHG | DIASTOLIC BLOOD PRESSURE: 85 MMHG | TEMPERATURE: 97.3 F | HEART RATE: 101 BPM

## 2021-05-07 PROCEDURE — 51702 INSERT TEMP BLADDER CATH: CPT

## 2021-05-07 PROCEDURE — 99213 OFFICE O/P EST LOW 20 MIN: CPT | Mod: 25

## 2021-05-07 PROCEDURE — 99072 ADDL SUPL MATRL&STAF TM PHE: CPT

## 2021-05-07 NOTE — ASSESSMENT
[FreeTextEntry1] : 71 yo female with urinary retention. The felix was removed today.  She will void at home.  She does not want to return for a PVR.  She promises to go to the ED if she is unable to urinate in 8 hours.  She does not want to learn how to perform CIC.  She will return on Monday for a PVR check.  \par \par Addendum:\par \par Patient called later in the day to say she had still not voided.  She returned to clinic and PVR was performed which showed > 500 cc in the bladder.  Felix catheter was replaced and drained 700 cc of clear yellow urine. \par \par She will return Monday for urodynamics

## 2021-05-07 NOTE — PHYSICAL EXAM
[General Appearance - Well Developed] : well developed [Normal Appearance] : normal appearance [General Appearance - In No Acute Distress] : no acute distress [Abdomen Soft] : soft [Urethral Meatus] : normal urethra [External Female Genitalia] : normal external genitalia [Skin Color & Pigmentation] : normal skin color and pigmentation [Heart Rate And Rhythm] : Heart rate and rhythm were normal [] : no respiratory distress [Oriented To Time, Place, And Person] : oriented to person, place, and time [Normal Station and Gait] : the gait and station were normal for the patient's age [No Focal Deficits] : no focal deficits

## 2021-05-08 LAB
APPEARANCE: CLEAR
BACTERIA: NEGATIVE
BILIRUBIN URINE: NEGATIVE
BLOOD URINE: NEGATIVE
COLOR: COLORLESS
GLUCOSE QUALITATIVE U: NEGATIVE
HYALINE CASTS: 0 /LPF
KETONES URINE: NEGATIVE
LEUKOCYTE ESTERASE URINE: NEGATIVE
MICROSCOPIC-UA: NORMAL
NITRITE URINE: NEGATIVE
PH URINE: 6.5
PROTEIN URINE: NEGATIVE
RED BLOOD CELLS URINE: 2 /HPF
SPECIFIC GRAVITY URINE: 1.01
SQUAMOUS EPITHELIAL CELLS: 0 /HPF
UROBILINOGEN URINE: NORMAL
WHITE BLOOD CELLS URINE: 0 /HPF

## 2021-05-10 ENCOUNTER — APPOINTMENT (OUTPATIENT)
Dept: UROLOGY | Facility: CLINIC | Age: 71
End: 2021-05-10
Payer: COMMERCIAL

## 2021-05-10 ENCOUNTER — APPOINTMENT (OUTPATIENT)
Dept: VASCULAR SURGERY | Facility: CLINIC | Age: 71
End: 2021-05-10
Payer: COMMERCIAL

## 2021-05-10 VITALS
SYSTOLIC BLOOD PRESSURE: 149 MMHG | OXYGEN SATURATION: 94 % | DIASTOLIC BLOOD PRESSURE: 83 MMHG | TEMPERATURE: 97.3 F | HEART RATE: 95 BPM

## 2021-05-10 LAB — BACTERIA UR CULT: NORMAL

## 2021-05-10 PROCEDURE — 51784 ANAL/URINARY MUSCLE STUDY: CPT

## 2021-05-10 PROCEDURE — 51798 US URINE CAPACITY MEASURE: CPT | Mod: 59

## 2021-05-10 PROCEDURE — 99213 OFFICE O/P EST LOW 20 MIN: CPT | Mod: 25

## 2021-05-10 PROCEDURE — 51741 ELECTRO-UROFLOWMETRY FIRST: CPT

## 2021-05-10 PROCEDURE — 99072 ADDL SUPL MATRL&STAF TM PHE: CPT

## 2021-05-10 PROCEDURE — 51728 CYSTOMETROGRAM W/VP: CPT

## 2021-05-10 PROCEDURE — 99024 POSTOP FOLLOW-UP VISIT: CPT

## 2021-05-10 PROCEDURE — 51797 INTRAABDOMINAL PRESSURE TEST: CPT

## 2021-05-10 NOTE — PHYSICAL EXAM
[General Appearance - Well Developed] : well developed [Normal Appearance] : normal appearance [General Appearance - In No Acute Distress] : no acute distress [Abdomen Soft] : soft [Abdomen Tenderness] : non-tender [Urethral Meatus] : normal urethra [External Female Genitalia] : normal external genitalia [Skin Color & Pigmentation] : normal skin color and pigmentation [Heart Rate And Rhythm] : Heart rate and rhythm were normal [] : no respiratory distress [Oriented To Time, Place, And Person] : oriented to person, place, and time [Normal Station and Gait] : the gait and station were normal for the patient's age [No Focal Deficits] : no focal deficits

## 2021-05-11 ENCOUNTER — APPOINTMENT (OUTPATIENT)
Dept: UROLOGY | Facility: CLINIC | Age: 71
End: 2021-05-11
Payer: COMMERCIAL

## 2021-05-11 PROCEDURE — 51702 INSERT TEMP BLADDER CATH: CPT

## 2021-05-11 PROCEDURE — 99072 ADDL SUPL MATRL&STAF TM PHE: CPT

## 2021-05-11 PROCEDURE — 99213 OFFICE O/P EST LOW 20 MIN: CPT | Mod: 25

## 2021-05-11 NOTE — ADDENDUM
[FreeTextEntry1] : This note was written by Tamela Schulz on 05/10/2021 acting as scribe for May Negron M.D.\par \par I, May Pérez have read and attest that all the information, medical decision making and discharge instructions within are true and accurate.

## 2021-05-11 NOTE — REASON FOR VISIT
[Spouse] : spouse [de-identified] : SWAPNIL BERRY [de-identified] : 4/24/2021 [de-identified] : 71 y/o F w/PMH of CAD, HTN, HLD, PAD , high grade L ICA stenosis s/p L CEA with roof patch on 10/2019 and high grade R ICA stenosis s/p R CEA on 4/24/2021 w/post op urinary retention requiring urinary catheter presents for routine follow up. Patient reports feeling well overall. She does mention seeing Dr. Montes with removal of urinary catheter with trial of void, however patient reports she was unable to void and returned to see Dr. Montes with new Medina replaced and plans to see Dr. Montes later today for urodynamic studies. Otherwise patient reports no R side neck pain, no drainage from incision and no neck pain, she denies any dizziness or lightheadedness. \par \par She is accompanied by family member today.

## 2021-05-11 NOTE — HISTORY OF PRESENT ILLNESS
[FreeTextEntry1] : 71 yo female with urinary retention following CEA 3 weeks ago. She has had multiple failed void trials. She had been on cipro for a UTI. She had UDS performed with Dr. Montes yesterday that demonstrated bladder atony. She was taught CIC to perform at home. Since yesterday, patient has had difficulty performing CIC and prefers to have indwelling felix catheter for now. She feels urgency to void and is currently uncomfortable. She has voided very small volumes. She was only able to self-cath twice with difficulty. No fevers, chills or hematuria.

## 2021-05-11 NOTE — ASSESSMENT
[FreeTextEntry1] : 70 year old with urinary retention after right CEA. UDS demonstrated bladder atony. She was taught CIC yesterday, but has had difficulty performing and prefers indwelling catheter for now. Medina catheter was replaced today. \par  - F/U with Dr. Montes to discuss next steps.

## 2021-05-11 NOTE — PHYSICAL EXAM
[Alert] : alert [Oriented to Person] : oriented to person [Oriented to Place] : oriented to place [Oriented to Time] : oriented to time [Calm] : calm [2+] : left 2+ [Right Carotid Bruit] : no bruit heard over the right carotid [Left Carotid Bruit] : no bruit heard over the left carotid [Ankle Swelling (On Exam)] : not present [Varicose Veins Of Lower Extremities] : not present [] : not present [Abdomen Tenderness] : ~T ~M No abdominal tenderness [de-identified] : Well appearing, NAD [de-identified] : NC/AT  [de-identified] : R neck side scar healed. No active drainage [de-identified] : Medina present [de-identified] : FROM [de-identified] : See neck section.  [de-identified] : Grossly intact

## 2021-05-11 NOTE — REVIEW OF SYSTEMS
[Negative] : Heme/Lymph [As Noted in HPI] : as noted in HPI [Dysuria] : dysuria [FreeTextEntry2] : Urinary retention

## 2021-05-11 NOTE — HISTORY OF PRESENT ILLNESS
[FreeTextEntry1] : 71 yo female returns for urodynamics to determine the nature of her urinary retention.  She has tolerated the catheter well.

## 2021-05-11 NOTE — DISCUSSION/SUMMARY
[FreeTextEntry1] : 69 y/o F high grade L ICA stenosis s/p L CEA with roof patch on 10/2019 and high grade R ICA stenosis s/p R CEA on 4/24/2021 w/post op urinary retention requiring urinary catheter presents for follow up. On exam, R neck side scar healed. \par \par Plan: \par Discussed findings and treatment options. Patient with persistent urinary retention. She will f/u with Dr. Montes today for urodynamic studies. Patient to communicate findings. \par No vascular surgery interventions recommended at this time. \par She will f/u here with repeat carotid US in 6 months. \par \par \par

## 2021-05-11 NOTE — ASSESSMENT
[FreeTextEntry1] : 69 yo female in urinary retention with no demonstrable bladder function on urodynamics today.  We discussed treatment options including CIC for a period o time to see if she can eventually regain some bladder function.  We also discussed referral to Dr. Griffin for further evaluation of her voiding dysfunction and consideration of treatment with neurostimulation if it is indicated.  She would prefer to try CIC for a a couple of weeks first and see if she is able to void on her own after allowing her bladder to cycle.  She will return next week for follow up. She will continue taking cipro as prescribed by her PCP.  She has been given supplies for CIC and we will have additional catheter supplies sent to her through her insurance.

## 2021-05-18 ENCOUNTER — APPOINTMENT (OUTPATIENT)
Dept: UROLOGY | Facility: CLINIC | Age: 71
End: 2021-05-18
Payer: COMMERCIAL

## 2021-05-18 VITALS — DIASTOLIC BLOOD PRESSURE: 90 MMHG | TEMPERATURE: 98.4 F | SYSTOLIC BLOOD PRESSURE: 160 MMHG | HEART RATE: 79 BPM

## 2021-05-18 PROCEDURE — 99214 OFFICE O/P EST MOD 30 MIN: CPT

## 2021-05-18 PROCEDURE — 99072 ADDL SUPL MATRL&STAF TM PHE: CPT

## 2021-05-18 NOTE — LETTER BODY
[Dear  ___] : Dear  [unfilled], [Courtesy Letter:] : I had the pleasure of seeing your patient, [unfilled], in my office today. [Please see my note below.] : Please see my note below. [Consult Closing:] : Thank you very much for allowing me to participate in the care of this patient.  If you have any questions, please do not hesitate to contact me. [Sincerely,] : Sincerely, [FreeTextEntry3] : Obdulio Perdomo MD

## 2021-05-18 NOTE — ASSESSMENT
[FreeTextEntry1] : 70 year old with urinary retention after right CEA. UDS demonstrated bladder atony. She was taught CIC previously, but had difficulty so catheter was replaced. She presents today for void trial and CIC teaching again. We briefly discussed limited management options if bladder is truly acontractile. Neuromodulation could be attempted, though success is not guaranteed. \par  - Follow up with Dr. Montes or Dr. Griffin to discuss neuromodulation.

## 2021-05-18 NOTE — HISTORY OF PRESENT ILLNESS
[FreeTextEntry1] : 71 yo female with urinary retention following CEA 3 weeks ago. She has had multiple failed void trials. She had been on cipro for a UTI. She had UDS performed with Dr. Montes yesterday that demonstrated bladder atony. She was taught CIC to perform at home. Since yesterday, patient has had difficulty performing CIC and prefers to have indwelling felix catheter for now. She feels urgency to void and is currently uncomfortable. She has voided very small volumes. She was only able to self-cath twice with difficulty. No fevers, chills or hematuria. \par \par Follow up 5/18/21: Presents today because she wants to attempt void trial and CIC again. She has not had any major issues with indwelling catheter, however, prefers to be independent of bag if possible. She denies fevers, chills, hematuria or flank pain.

## 2021-05-18 NOTE — PHYSICAL EXAM
[General Appearance - Well Developed] : well developed [General Appearance - Well Nourished] : well nourished [Normal Appearance] : normal appearance [Well Groomed] : well groomed [General Appearance - In No Acute Distress] : no acute distress [Edema] : no peripheral edema [] : no respiratory distress [Respiration, Rhythm And Depth] : normal respiratory rhythm and effort [Oriented To Time, Place, And Person] : oriented to person, place, and time [Exaggerated Use Of Accessory Muscles For Inspiration] : no accessory muscle use [Affect] : the affect was normal [Mood] : the mood was normal [Not Anxious] : not anxious [Normal Station and Gait] : the gait and station were normal for the patient's age

## 2021-06-22 ENCOUNTER — TRANSCRIPTION ENCOUNTER (OUTPATIENT)
Age: 71
End: 2021-06-22

## 2021-09-02 ENCOUNTER — RX RENEWAL (OUTPATIENT)
Age: 71
End: 2021-09-02

## 2021-09-12 ENCOUNTER — RX RENEWAL (OUTPATIENT)
Age: 71
End: 2021-09-12

## 2021-09-28 ENCOUNTER — APPOINTMENT (OUTPATIENT)
Dept: INTERNAL MEDICINE | Facility: CLINIC | Age: 71
End: 2021-09-28
Payer: COMMERCIAL

## 2021-09-28 PROCEDURE — 90662 IIV NO PRSV INCREASED AG IM: CPT

## 2021-09-28 PROCEDURE — 90471 IMMUNIZATION ADMIN: CPT

## 2021-09-28 PROCEDURE — 99213 OFFICE O/P EST LOW 20 MIN: CPT | Mod: 25

## 2021-09-28 NOTE — HISTORY OF PRESENT ILLNESS
[FreeTextEntry1] : flu vac [de-identified] : 71 yo f with HTN s/p right CEA\par s/p urinary retention- seen by Dr Montes - failed TOV as outpatient, following up \par \par Took valsartan 240mg this morning \par  recent bp 90/70 at times

## 2021-09-28 NOTE — PLAN
[FreeTextEntry1] : HTN with occasional dizzines\par  change to 160mg am and 80mg pm and monitor symptoms\par \par fluvax today

## 2021-11-08 ENCOUNTER — APPOINTMENT (OUTPATIENT)
Dept: VASCULAR SURGERY | Facility: CLINIC | Age: 71
End: 2021-11-08
Payer: COMMERCIAL

## 2021-11-08 PROCEDURE — 93880 EXTRACRANIAL BILAT STUDY: CPT

## 2021-11-08 PROCEDURE — 99213 OFFICE O/P EST LOW 20 MIN: CPT

## 2021-11-08 NOTE — PHYSICAL EXAM
[2+] : left 2+ [Alert] : alert [Oriented to Person] : oriented to person [Oriented to Place] : oriented to place [Oriented to Time] : oriented to time [Calm] : calm [Right Carotid Bruit] : no bruit heard over the right carotid [Left Carotid Bruit] : no bruit heard over the left carotid [Ankle Swelling (On Exam)] : not present [Varicose Veins Of Lower Extremities] : not present [] : not present [Abdomen Tenderness] : ~T ~M No abdominal tenderness [de-identified] : Well appearing, NAD [de-identified] : NC/AT  [de-identified] : R neck side scar healed. No active drainage [de-identified] : Medina present [de-identified] : FROM [de-identified] : See neck section.  [de-identified] : Grossly intact

## 2021-11-08 NOTE — REVIEW OF SYSTEMS
[As Noted in HPI] : as noted in HPI [Dysuria] : dysuria [Negative] : Heme/Lymph [FreeTextEntry2] : Urinary retention

## 2021-11-08 NOTE — DISCUSSION/SUMMARY
[FreeTextEntry1] : 71 y/o F high grade L ICA stenosis s/p L CEA with roof patch on 10/2019 and high grade R ICA stenosis s/p R CEA on 4/24/2021 w/post op urinary retention requiring urinary catheter presents for 6 month follow up. On exam, R neck side scar healed. \par \par Plan: \par 1. Carotid incisions are healing well. R carotid incision is healed with minimal scarring.\par 2. DUS performed in the office demonstrating patent ICA bilaterally with no resnosis bilaterally; will f/u in 6 months for repeat bilateral carotid duplex.\par 3. Patient still having urinary retention, will f/u with urology. \par \par \par

## 2021-11-08 NOTE — REASON FOR VISIT
[Spouse] : spouse [de-identified] : SWAPNIL BERRY [de-identified] : 4/24/2021 [de-identified] : 71 y/o F w/PMH of CAD, HTN, HLD, PAD , high grade L ICA stenosis s/p L CEA with roof patch on 10/2019 and high grade R ICA stenosis s/p R CEA on 4/24/2021. Patient is doing well from R CEA perspective, denies any focal neurological changes and any past or recent symptoms of upper/lower extremity weakness, dysathria, slurred speech. She does note that she is contuining to self-straight cath and has not been able to find an answer from her urologist and all testing has been inconclusive so far.

## 2022-04-25 ENCOUNTER — LABORATORY RESULT (OUTPATIENT)
Age: 72
End: 2022-04-25

## 2022-04-25 ENCOUNTER — APPOINTMENT (OUTPATIENT)
Dept: INTERNAL MEDICINE | Facility: CLINIC | Age: 72
End: 2022-04-25
Payer: COMMERCIAL

## 2022-04-25 VITALS
HEART RATE: 84 BPM | DIASTOLIC BLOOD PRESSURE: 80 MMHG | OXYGEN SATURATION: 98 % | TEMPERATURE: 97.2 F | WEIGHT: 115 LBS | SYSTOLIC BLOOD PRESSURE: 130 MMHG | RESPIRATION RATE: 14 BRPM | HEIGHT: 69 IN | BODY MASS INDEX: 17.03 KG/M2

## 2022-04-25 PROCEDURE — 99397 PER PM REEVAL EST PAT 65+ YR: CPT

## 2022-04-25 PROCEDURE — 36415 COLL VENOUS BLD VENIPUNCTURE: CPT

## 2022-04-25 PROCEDURE — 93000 ELECTROCARDIOGRAM COMPLETE: CPT

## 2022-04-25 NOTE — HISTORY OF PRESENT ILLNESS
[FreeTextEntry1] : wellness [de-identified] : 70 yo f with HTN s/p right CEA\par -she continues to self-catheterize - has been to multiple doctors. \par - no further infections during this time period.\par \par Due for colonoscopy.  \par Took valsartan 240mg this morning    doing well for her \par vascualr appt pending

## 2022-04-25 NOTE — PLAN
[FreeTextEntry1] : wellness complete\par labs today\par  ekg nsr\par BP now well controlled\par Vascular eval pending \par  as needed - for now continue to straight cath\par colonoscopy referral  othrwise hcm uptodate

## 2022-04-26 ENCOUNTER — NON-APPOINTMENT (OUTPATIENT)
Age: 72
End: 2022-04-26

## 2022-04-26 LAB
25(OH)D3 SERPL-MCNC: 43.1 NG/ML
ALBUMIN SERPL ELPH-MCNC: 4.7 G/DL
ALP BLD-CCNC: 226 U/L
ALT SERPL-CCNC: 65 U/L
ANION GAP SERPL CALC-SCNC: 15 MMOL/L
APPEARANCE: ABNORMAL
AST SERPL-CCNC: 59 U/L
BASOPHILS # BLD AUTO: 0.08 K/UL
BASOPHILS NFR BLD AUTO: 1.4 %
BILIRUB SERPL-MCNC: 0.5 MG/DL
BILIRUBIN URINE: NEGATIVE
BLOOD URINE: NEGATIVE
BUN SERPL-MCNC: 27 MG/DL
CALCIUM SERPL-MCNC: 10.4 MG/DL
CHLORIDE SERPL-SCNC: 106 MMOL/L
CHOLEST SERPL-MCNC: 211 MG/DL
CO2 SERPL-SCNC: 20 MMOL/L
COLOR: YELLOW
CREAT SERPL-MCNC: 1 MG/DL
EGFR: 60 ML/MIN/1.73M2
EOSINOPHIL # BLD AUTO: 0.25 K/UL
EOSINOPHIL NFR BLD AUTO: 4.5 %
ESTIMATED AVERAGE GLUCOSE: 117 MG/DL
GLUCOSE QUALITATIVE U: NEGATIVE
GLUCOSE SERPL-MCNC: 95 MG/DL
HBA1C MFR BLD HPLC: 5.7 %
HCT VFR BLD CALC: 42.8 %
HDLC SERPL-MCNC: 128 MG/DL
HGB BLD-MCNC: 13.1 G/DL
IMM GRANULOCYTES NFR BLD AUTO: 0.5 %
KETONES URINE: NEGATIVE
LDLC SERPL CALC-MCNC: 70 MG/DL
LEUKOCYTE ESTERASE URINE: ABNORMAL
LYMPHOCYTES # BLD AUTO: 1.73 K/UL
LYMPHOCYTES NFR BLD AUTO: 30.8 %
MAN DIFF?: NORMAL
MCHC RBC-ENTMCNC: 30.6 GM/DL
MCHC RBC-ENTMCNC: 33.3 PG
MCV RBC AUTO: 108.9 FL
MONOCYTES # BLD AUTO: 0.58 K/UL
MONOCYTES NFR BLD AUTO: 10.3 %
NEUTROPHILS # BLD AUTO: 2.94 K/UL
NEUTROPHILS NFR BLD AUTO: 52.5 %
NITRITE URINE: NEGATIVE
NONHDLC SERPL-MCNC: 83 MG/DL
PH URINE: 5.5
PLATELET # BLD AUTO: 256 K/UL
POTASSIUM SERPL-SCNC: 5.2 MMOL/L
PROT SERPL-MCNC: 7.6 G/DL
PROTEIN URINE: NEGATIVE
RBC # BLD: 3.93 M/UL
RBC # FLD: 15.3 %
SODIUM SERPL-SCNC: 141 MMOL/L
SPECIFIC GRAVITY URINE: 1.02
TRIGL SERPL-MCNC: 62 MG/DL
TSH SERPL-ACNC: 2.62 UIU/ML
UROBILINOGEN URINE: NORMAL
WBC # FLD AUTO: 5.61 K/UL

## 2022-05-06 ENCOUNTER — NON-APPOINTMENT (OUTPATIENT)
Age: 72
End: 2022-05-06

## 2022-05-09 ENCOUNTER — APPOINTMENT (OUTPATIENT)
Dept: VASCULAR SURGERY | Facility: CLINIC | Age: 72
End: 2022-05-09
Payer: COMMERCIAL

## 2022-05-09 ENCOUNTER — APPOINTMENT (OUTPATIENT)
Dept: VASCULAR SURGERY | Facility: CLINIC | Age: 72
End: 2022-05-09

## 2022-05-09 VITALS
WEIGHT: 115 LBS | SYSTOLIC BLOOD PRESSURE: 170 MMHG | HEIGHT: 69 IN | HEART RATE: 85 BPM | DIASTOLIC BLOOD PRESSURE: 80 MMHG | BODY MASS INDEX: 17.03 KG/M2

## 2022-05-09 PROCEDURE — 99212 OFFICE O/P EST SF 10 MIN: CPT

## 2022-05-09 PROCEDURE — 93880 EXTRACRANIAL BILAT STUDY: CPT

## 2022-05-11 NOTE — HISTORY OF PRESENT ILLNESS
[FreeTextEntry1] : 70yoF s/p L CEA w/patch on 10/2019 and R CEA on 4/24/2021 w Dr. Lopez, presents for routine surveillance. She denies any weakness, headaches, vision loss, or blurry vision. She had a recent physical and her health is otherwise well. Pt is on \par \par nonsmoker \par Raynaud's \par \par \par \par

## 2022-05-11 NOTE — PHYSICAL EXAM
[2+] : left 2+ [Alert] : alert [Oriented to Person] : oriented to person [Oriented to Place] : oriented to place [Oriented to Time] : oriented to time [Calm] : calm [Right Carotid Bruit] : no bruit heard over the right carotid [Left Carotid Bruit] : no bruit heard over the left carotid [Ankle Swelling (On Exam)] : not present [Varicose Veins Of Lower Extremities] : not present [] : not present [Abdomen Tenderness] : ~T ~M No abdominal tenderness [de-identified] : Well appearing, NAD [de-identified] : NC/AT  [de-identified] : R neck side scar healed. No active drainage [de-identified] : Medina present [de-identified] : FROM [de-identified] : See neck section.  [de-identified] : Grossly intact

## 2022-05-11 NOTE — ASSESSMENT
[Carotid Endarterectomy] : carotid endarterectomy [FreeTextEntry1] : 70yoF s/p L CEA w/patch on 10/2019 and R CEA on 4/24/2021 w/, presents for routine surveillance which showed no significant stenosis.\par \par Plan: \par 1. Carotid incisions are healed well.\par 2. DUS performed in the office demonstrating patent ICA bilaterally with no restenosis bilaterally; will f/u in 6 months for repeat bilateral carotid duplex.\par

## 2022-05-11 NOTE — ADDENDUM
[FreeTextEntry1] : \par \par \par \par \par \par The documentation for this encounter was entered by Feliciano Jim acting as scribe for Woo Escobar.\par

## 2022-05-11 NOTE — PROCEDURE
[FreeTextEntry1] : 5/9/22: Carotid duplex- L: 112/23, R: 122/29, no hemodynamically significant stenosis\par

## 2022-06-02 ENCOUNTER — APPOINTMENT (OUTPATIENT)
Dept: UROLOGY | Facility: CLINIC | Age: 72
End: 2022-06-02
Payer: COMMERCIAL

## 2022-06-02 VITALS
WEIGHT: 115 LBS | RESPIRATION RATE: 18 BRPM | BODY MASS INDEX: 17.03 KG/M2 | HEIGHT: 69 IN | TEMPERATURE: 98.2 F | HEART RATE: 85 BPM | SYSTOLIC BLOOD PRESSURE: 154 MMHG | DIASTOLIC BLOOD PRESSURE: 89 MMHG

## 2022-06-02 DIAGNOSIS — N31.9 NEUROMUSCULAR DYSFUNCTION OF BLADDER, UNSPECIFIED: ICD-10-CM

## 2022-06-02 LAB
BILIRUB UR QL STRIP: NORMAL
CLARITY UR: CLEAR
COLLECTION METHOD: NORMAL
GLUCOSE UR-MCNC: NORMAL
HCG UR QL: 0.2 EU/DL
HGB UR QL STRIP.AUTO: NORMAL
KETONES UR-MCNC: NORMAL
LEUKOCYTE ESTERASE UR QL STRIP: NORMAL
NITRITE UR QL STRIP: POSITIVE
PH UR STRIP: 5.5
PROT UR STRIP-MCNC: NORMAL
SP GR UR STRIP: 1.01

## 2022-06-02 PROCEDURE — 99213 OFFICE O/P EST LOW 20 MIN: CPT

## 2022-06-02 PROCEDURE — 81003 URINALYSIS AUTO W/O SCOPE: CPT | Mod: QW

## 2022-06-02 NOTE — ASSESSMENT
[FreeTextEntry1] : 72 yo female with an atonic bladder on CIC.  We discussed options for management of atonic bladder including continue CIC vs. consideration of neuromodulation.  She prefers to continue with CIC.  \par \par Plan:\par 1. RTC in 1 year

## 2022-06-02 NOTE — HISTORY OF PRESENT ILLNESS
[FreeTextEntry1] : 5/21/21:\par 69 yo female returns for urodynamics to determine the nature of her urinary retention.  She has tolerated the catheter well.  \par \par ***********\par 6/2/22:\par Patient returns for annual follow up.  She has been successfully performing CIC 4-5 times a day.  She voids minimally on her own.  She is otherwise doing very well and without complaint.

## 2022-07-22 ENCOUNTER — APPOINTMENT (OUTPATIENT)
Dept: INTERNAL MEDICINE | Facility: CLINIC | Age: 72
End: 2022-07-22

## 2022-07-22 VITALS
HEIGHT: 69 IN | BODY MASS INDEX: 17.63 KG/M2 | HEART RATE: 80 BPM | OXYGEN SATURATION: 98 % | RESPIRATION RATE: 18 BRPM | WEIGHT: 119 LBS | TEMPERATURE: 97.8 F | SYSTOLIC BLOOD PRESSURE: 140 MMHG | DIASTOLIC BLOOD PRESSURE: 70 MMHG

## 2022-07-22 PROCEDURE — 99213 OFFICE O/P EST LOW 20 MIN: CPT | Mod: 25

## 2022-07-22 PROCEDURE — 36415 COLL VENOUS BLD VENIPUNCTURE: CPT

## 2022-07-22 NOTE — HISTORY OF PRESENT ILLNESS
[FreeTextEntry1] : flu vac [de-identified] : 70 yo f with HTN s/p right CEA and left CEA\par -she continues to self-catheterize - has been to multiple doctors. \par - no further infections during this time period.\par \par recent URI - resolving but minimal cough residual\par \par here for rpt lfts\par \par

## 2022-07-23 LAB
ALBUMIN SERPL ELPH-MCNC: 4.8 G/DL
ALP BLD-CCNC: 187 U/L
ALT SERPL-CCNC: 51 U/L
AST SERPL-CCNC: 44 U/L
BILIRUB DIRECT SERPL-MCNC: 0.1 MG/DL
BILIRUB INDIRECT SERPL-MCNC: 0.2 MG/DL
BILIRUB SERPL-MCNC: 0.3 MG/DL
HBV SURFACE AB SER QL: NONREACTIVE
HBV SURFACE AG SER QL: NONREACTIVE
HCV AB SER QL: NONREACTIVE
HCV S/CO RATIO: 0.7 S/CO
PROT SERPL-MCNC: 7.5 G/DL

## 2022-09-26 ENCOUNTER — APPOINTMENT (OUTPATIENT)
Dept: INTERNAL MEDICINE | Facility: CLINIC | Age: 72
End: 2022-09-26

## 2022-09-26 PROCEDURE — 90662 IIV NO PRSV INCREASED AG IM: CPT

## 2022-09-26 PROCEDURE — 90471 IMMUNIZATION ADMIN: CPT

## 2022-10-19 ENCOUNTER — NON-APPOINTMENT (OUTPATIENT)
Age: 72
End: 2022-10-19

## 2022-10-20 ENCOUNTER — TRANSCRIPTION ENCOUNTER (OUTPATIENT)
Age: 72
End: 2022-10-20

## 2022-11-21 ENCOUNTER — APPOINTMENT (OUTPATIENT)
Dept: VASCULAR SURGERY | Facility: CLINIC | Age: 72
End: 2022-11-21

## 2022-11-21 VITALS
BODY MASS INDEX: 17.63 KG/M2 | WEIGHT: 119 LBS | HEART RATE: 97 BPM | DIASTOLIC BLOOD PRESSURE: 97 MMHG | HEIGHT: 69 IN | SYSTOLIC BLOOD PRESSURE: 179 MMHG

## 2022-11-21 DIAGNOSIS — I65.29 OCCLUSION AND STENOSIS OF UNSPECIFIED CAROTID ARTERY: ICD-10-CM

## 2022-11-21 PROCEDURE — 99213 OFFICE O/P EST LOW 20 MIN: CPT

## 2022-11-21 PROCEDURE — 93880 EXTRACRANIAL BILAT STUDY: CPT

## 2022-11-21 NOTE — PHYSICAL EXAM
[Right Carotid Bruit] : no bruit heard over the right carotid [Left Carotid Bruit] : no bruit heard over the left carotid [Ankle Swelling (On Exam)] : not present [Varicose Veins Of Lower Extremities] : not present [] : not present [Abdomen Tenderness] : ~T ~M No abdominal tenderness [de-identified] : Well appearing, NAD [de-identified] : NC/AT  [de-identified] : BL neck incisions are well healed.  [de-identified] : Medina present [de-identified] : FROM 5/5x4 [de-identified] : Grossly intact

## 2022-11-21 NOTE — ASSESSMENT
[FreeTextEntry1] : 73 yo F s/p L CEA w/patch on 10/2019 and R CEA on 4/24/2021 w Dr. Lopez, presents for routine surveillance. Patient is feeling well, no neurologic deficits.\par Carotid incisions are healed well, no carotid bruit.\par DUS performed in the office demonstrating patent ICA bilaterally with no restenosis bilaterally; she will f/u in 6 months for repeat bilateral carotid duplex.\par

## 2022-11-21 NOTE — PROCEDURE
[FreeTextEntry1] :  Carotid duplex- patent bilateral CEA, no hemodynamically significant stenosis\par

## 2022-11-21 NOTE — HISTORY OF PRESENT ILLNESS
[FreeTextEntry1] : 71 yo F s/p L CEA w/patch on 10/2019 and R CEA on 4/24/2021 w Dr. Lopez, presents for routine surveillance. She denies any weakness, headaches, vision loss, or blurry vision. She is doing well otherwise. \par \par \par \par \par \par

## 2022-12-18 ENCOUNTER — RX RENEWAL (OUTPATIENT)
Age: 72
End: 2022-12-18

## 2023-01-25 ENCOUNTER — LABORATORY RESULT (OUTPATIENT)
Age: 73
End: 2023-01-25

## 2023-01-28 LAB
APPEARANCE: CLEAR
BILIRUBIN URINE: NEGATIVE
BLOOD URINE: NEGATIVE
COLOR: YELLOW
GLUCOSE QUALITATIVE U: NEGATIVE
KETONES URINE: NORMAL
LEUKOCYTE ESTERASE URINE: ABNORMAL
NITRITE URINE: POSITIVE
PH URINE: 6
PROTEIN URINE: NEGATIVE
SPECIFIC GRAVITY URINE: 1.02
UROBILINOGEN URINE: NORMAL

## 2023-03-31 NOTE — HISTORY OF PRESENT ILLNESS
The Service to podiatry order in workqueue [id number] requested on 3/16/2023 has been removed as, unable to contact. Ordering provider has been notified.    Please contact patient, if further communication is needed.   [de-identified] : \par With PT has developed some right sided bursitis.  -Considering cortisone injection\par BP at home has been very well controlled at home.  \par Walking 3-4 miles a day.    Exercising 5 times a week\par  \par  [FreeTextEntry1] : Cholesterol follow up

## 2023-05-09 ENCOUNTER — APPOINTMENT (OUTPATIENT)
Dept: VASCULAR SURGERY | Facility: CLINIC | Age: 73
End: 2023-05-09
Payer: COMMERCIAL

## 2023-05-09 PROCEDURE — 99213 OFFICE O/P EST LOW 20 MIN: CPT

## 2023-05-09 PROCEDURE — 93880 EXTRACRANIAL BILAT STUDY: CPT

## 2023-05-24 NOTE — ADDENDUM
[FreeTextEntry1] : \par This note was written by Christiane GASCA, acting as a scribe for Dr. Christiano Doe.  I, Dr. Christiano Doe, have read and attest that all the information, medical decision-making, and discharge instructions within are true and accurate.\par \par I, Dr. Christiano Doe, personally performed the evaluation and management (E/M) services for this established patient who presents today with (an) existing condition(s).  That E/M includes conducting the examination, assessing all conditions, and (re)establishing/reinforcing a plan of care.  Today, my ACP, Christiane GASCA, was here to observe my evaluation and management services for this condition to be followed going forward.\par \par \par

## 2023-05-24 NOTE — ASSESSMENT
[Carotid Endarterectomy] : carotid endarterectomy [FreeTextEntry1] : 72yoF with CAD, HTN, HLD, PAD , high grade L ICA stenosis s/p L CEA with roof patch on 10/2019 and high grade R ICA stenosis s/p R CEA on 4/24/2021, presents for routine surveillance. Patient is feeling well, no neurologic deficits.\par Carotid incisions are healed well, no carotid bruit.\par DUS performed in the office demonstrating patent ICA bilaterally with no restenosis bilaterally; she will f/u in 6 months for repeat bilateral carotid duplex. Recommended to stay on ASA and statin.\par

## 2023-05-24 NOTE — PHYSICAL EXAM
[2+] : left 2+ [Alert] : alert [Oriented to Person] : oriented to person [Oriented to Place] : oriented to place [Oriented to Time] : oriented to time [Calm] : calm [Right Carotid Bruit] : no bruit heard over the right carotid [Left Carotid Bruit] : no bruit heard over the left carotid [Ankle Swelling (On Exam)] : not present [Varicose Veins Of Lower Extremities] : not present [] : not present [Abdomen Tenderness] : ~T ~M No abdominal tenderness [de-identified] : Well appearing, NAD [de-identified] : NC/AT  [de-identified] : BL neck incisions are well healed.  [de-identified] : Medina present [de-identified] : FROM 5/5x4 [de-identified] : Grossly intact

## 2023-05-24 NOTE — HISTORY OF PRESENT ILLNESS
[FreeTextEntry1] : 72yoF with CAD, HTN, HLD, PAD , high grade L ICA stenosis s/p L CEA with roof patch on 10/2019 and high grade R ICA stenosis s/p R CEA on 4/24/2021, presents for routine surveillance. She denies any weakness, headaches, vision loss, or blurry vision. She is doing well otherwise. Continues to perform self catheterization due to chronic urinary retention/atonic bladder.\par \par \par \par \par \par

## 2023-06-19 ENCOUNTER — APPOINTMENT (OUTPATIENT)
Dept: INTERNAL MEDICINE | Facility: CLINIC | Age: 73
End: 2023-06-19
Payer: COMMERCIAL

## 2023-06-19 ENCOUNTER — NON-APPOINTMENT (OUTPATIENT)
Age: 73
End: 2023-06-19

## 2023-06-19 VITALS
DIASTOLIC BLOOD PRESSURE: 72 MMHG | RESPIRATION RATE: 16 BRPM | BODY MASS INDEX: 18.83 KG/M2 | TEMPERATURE: 97 F | SYSTOLIC BLOOD PRESSURE: 111 MMHG | HEIGHT: 67 IN | OXYGEN SATURATION: 97 % | HEART RATE: 97 BPM | WEIGHT: 120 LBS

## 2023-06-19 DIAGNOSIS — Z00.00 ENCOUNTER FOR GENERAL ADULT MEDICAL EXAMINATION W/OUT ABNORMAL FINDINGS: ICD-10-CM

## 2023-06-19 PROCEDURE — 99397 PER PM REEVAL EST PAT 65+ YR: CPT | Mod: 25

## 2023-06-19 PROCEDURE — 36415 COLL VENOUS BLD VENIPUNCTURE: CPT

## 2023-06-19 PROCEDURE — 93000 ELECTROCARDIOGRAM COMPLETE: CPT

## 2023-06-19 RX ORDER — ASPIRIN 81 MG/1
81 TABLET, COATED ORAL
Qty: 1 | Refills: 0 | Status: ACTIVE | COMMUNITY
Start: 2023-06-19

## 2023-06-19 RX ORDER — CILOSTAZOL 50 MG/1
50 TABLET ORAL
Qty: 180 | Refills: 3 | Status: DISCONTINUED | COMMUNITY
Start: 2019-05-20 | End: 2023-06-19

## 2023-06-19 NOTE — PLAN
[FreeTextEntry1] : wellness complete\par labs today\par  ekg nsr\par BP now well controlled\par Vascular - routine f/up\par also encourage cardiology follow up\par  as needed - for now continue to straight cath\par colonoscopy for this year

## 2023-06-19 NOTE — HISTORY OF PRESENT ILLNESS
[FreeTextEntry1] : wellness [de-identified] : 72yoF with  HTN, HLD, PAD , high grade L ICA stenosis s/p L CEA with roof patch on 10/2019 and high grade R ICA stenosis s/p R CEA on 4/24/2021. Continues to perform self catheterization due to chronic urinary retention/atonic bladder.\par - vision issues- seeing a new ophthalmologist now.  \par plan for cataract surgery in the fall. \par plan for colonoscopy - this year new doctor. \par - asthma was an issue when we had the smoke\par \par \par \par Mammo\par Colonoscopy

## 2023-06-22 LAB
ALBUMIN SERPL ELPH-MCNC: 4.7 G/DL
ALP BLD-CCNC: 152 U/L
ALT SERPL-CCNC: 51 U/L
ANION GAP SERPL CALC-SCNC: 13 MMOL/L
APPEARANCE: CLEAR
AST SERPL-CCNC: 43 U/L
BILIRUB SERPL-MCNC: 0.6 MG/DL
BILIRUBIN URINE: NEGATIVE
BLOOD URINE: NEGATIVE
BUN SERPL-MCNC: 35 MG/DL
CALCIUM SERPL-MCNC: 10.8 MG/DL
CHLORIDE SERPL-SCNC: 100 MMOL/L
CHOLEST SERPL-MCNC: 219 MG/DL
CO2 SERPL-SCNC: 24 MMOL/L
COLOR: YELLOW
CREAT SERPL-MCNC: 1.13 MG/DL
EGFR: 51 ML/MIN/1.73M2
ESTIMATED AVERAGE GLUCOSE: 120 MG/DL
GLUCOSE QUALITATIVE U: NEGATIVE MG/DL
GLUCOSE SERPL-MCNC: 94 MG/DL
HBA1C MFR BLD HPLC: 5.8 %
HDLC SERPL-MCNC: 124 MG/DL
KETONES URINE: ABNORMAL MG/DL
LDLC SERPL CALC-MCNC: 82 MG/DL
LEUKOCYTE ESTERASE URINE: ABNORMAL
NITRITE URINE: POSITIVE
NONHDLC SERPL-MCNC: 96 MG/DL
PH URINE: 6
POTASSIUM SERPL-SCNC: 5.6 MMOL/L
PROT SERPL-MCNC: 7.9 G/DL
PROTEIN URINE: NEGATIVE MG/DL
SODIUM SERPL-SCNC: 137 MMOL/L
SPECIFIC GRAVITY URINE: 1.01
TRIGL SERPL-MCNC: 69 MG/DL
TSH SERPL-ACNC: 2.23 UIU/ML
UROBILINOGEN URINE: 0.2 MG/DL

## 2023-06-23 ENCOUNTER — APPOINTMENT (OUTPATIENT)
Dept: HEART AND VASCULAR | Facility: CLINIC | Age: 73
End: 2023-06-23

## 2023-07-10 NOTE — HISTORY OF PRESENT ILLNESS
[FreeTextEntry1] : 71 yo female with urinary retention following CEA 2 weeks ago.  She feels strongly today that she would like to have her catheter removed without a trialof void and she would like to go home.  She feels that she is unable to void in the office setting or when she isd artificially filled during an active trial of void.  She has been on cipro for a UTI for the last 7 days and her PCP put her on an additional week of abx.  \par \par Medina removal\par -Catheter balloon deflated and catheter removed in its entirety no

## 2023-07-12 ENCOUNTER — APPOINTMENT (OUTPATIENT)
Dept: INTERNAL MEDICINE | Facility: CLINIC | Age: 73
End: 2023-07-12
Payer: COMMERCIAL

## 2023-07-12 ENCOUNTER — NON-APPOINTMENT (OUTPATIENT)
Age: 73
End: 2023-07-12

## 2023-07-12 VITALS
DIASTOLIC BLOOD PRESSURE: 70 MMHG | OXYGEN SATURATION: 97 % | SYSTOLIC BLOOD PRESSURE: 135 MMHG | RESPIRATION RATE: 16 BRPM | BODY MASS INDEX: 18.83 KG/M2 | HEART RATE: 61 BPM | WEIGHT: 120 LBS | HEIGHT: 67 IN | TEMPERATURE: 97 F

## 2023-07-12 DIAGNOSIS — E83.52 HYPERCALCEMIA: ICD-10-CM

## 2023-07-12 DIAGNOSIS — R79.89 OTHER SPECIFIED ABNORMAL FINDINGS OF BLOOD CHEMISTRY: ICD-10-CM

## 2023-07-12 PROCEDURE — 36415 COLL VENOUS BLD VENIPUNCTURE: CPT

## 2023-07-12 PROCEDURE — 99213 OFFICE O/P EST LOW 20 MIN: CPT | Mod: 25

## 2023-07-12 NOTE — HISTORY OF PRESENT ILLNESS
[FreeTextEntry1] : flu vac [de-identified] : 72 yoF with  HTN, HLD, PAD , high grade L ICA stenosis s/p L CEA with roof patch on 10/2019 and high grade R ICA stenosis s/p R CEA on 4/24/2021. Continues to perform self catheterization due to chronic urinary retention/atonic bladder.\par - here for eval of electrolyte issues - has been drinking more fluids which has in turn made her feel worse, mild nausea. and urinary frequency - even to the point she did not have to self cath.

## 2023-07-12 NOTE — PLAN
[FreeTextEntry1] : Elev pot and Calcium\par check repeat labs\par  for now continue ARB\par dec fluid intake and monitor symptoms of nausea.

## 2023-07-13 ENCOUNTER — NON-APPOINTMENT (OUTPATIENT)
Age: 73
End: 2023-07-13

## 2023-07-13 LAB
ALBUMIN SERPL ELPH-MCNC: 4.7 G/DL
ALP BLD-CCNC: 141 U/L
ALT SERPL-CCNC: 43 U/L
ANION GAP SERPL CALC-SCNC: 11 MMOL/L
AST SERPL-CCNC: 42 U/L
BILIRUB SERPL-MCNC: 0.5 MG/DL
BUN SERPL-MCNC: 25 MG/DL
CALCIUM SERPL-MCNC: 10.5 MG/DL
CALCIUM SERPL-MCNC: 10.5 MG/DL
CHLORIDE SERPL-SCNC: 99 MMOL/L
CO2 SERPL-SCNC: 26 MMOL/L
CREAT SERPL-MCNC: 1.06 MG/DL
EGFR: 55 ML/MIN/1.73M2
GLUCOSE SERPL-MCNC: 103 MG/DL
PARATHYROID HORMONE INTACT: 23 PG/ML
POTASSIUM SERPL-SCNC: 6.1 MMOL/L
PROT SERPL-MCNC: 7.5 G/DL
SODIUM SERPL-SCNC: 136 MMOL/L

## 2023-07-21 ENCOUNTER — APPOINTMENT (OUTPATIENT)
Dept: HEART AND VASCULAR | Facility: CLINIC | Age: 73
End: 2023-07-21

## 2023-07-25 ENCOUNTER — APPOINTMENT (OUTPATIENT)
Dept: INTERNAL MEDICINE | Facility: CLINIC | Age: 73
End: 2023-07-25
Payer: COMMERCIAL

## 2023-07-25 VITALS
RESPIRATION RATE: 16 BRPM | HEART RATE: 79 BPM | HEIGHT: 69 IN | BODY MASS INDEX: 17.77 KG/M2 | WEIGHT: 120 LBS | DIASTOLIC BLOOD PRESSURE: 69 MMHG | SYSTOLIC BLOOD PRESSURE: 122 MMHG | TEMPERATURE: 98.2 F | OXYGEN SATURATION: 97 %

## 2023-07-25 PROCEDURE — 99213 OFFICE O/P EST LOW 20 MIN: CPT

## 2023-07-25 NOTE — HISTORY OF PRESENT ILLNESS
[FreeTextEntry1] : flu vac [de-identified] : 72 yoF with  HTN, HLD, PAD , high grade L ICA stenosis s/p L CEA with roof patch on 10/2019 and high grade R ICA stenosis s/p R CEA on 4/24/2021.\par Nausea has resolved, drinking   1-2 cupse coffee, 2 glasses of water.   1 glass at 10am 1 at lunch and glass of wine.   - some power aid.  1 glass at night.  \par needs repeat labs with recent hyperkalemia\par \par

## 2023-07-25 NOTE — PLAN
[FreeTextEntry1] : hyperkalemia - repeat cmp today. \par HTN- well controlled\par  Nausea reaolved\par \par continue to monitor potasssium intake\par \par

## 2023-07-26 ENCOUNTER — NON-APPOINTMENT (OUTPATIENT)
Age: 73
End: 2023-07-26

## 2023-07-26 LAB
ALBUMIN SERPL ELPH-MCNC: 4.8 G/DL
ALP BLD-CCNC: 143 U/L
ALT SERPL-CCNC: 42 U/L
ANION GAP SERPL CALC-SCNC: 15 MMOL/L
AST SERPL-CCNC: 40 U/L
BILIRUB SERPL-MCNC: 0.4 MG/DL
BUN SERPL-MCNC: 28 MG/DL
CALCIUM SERPL-MCNC: 10.6 MG/DL
CHLORIDE SERPL-SCNC: 97 MMOL/L
CO2 SERPL-SCNC: 22 MMOL/L
CREAT SERPL-MCNC: 1.08 MG/DL
EGFR: 54 ML/MIN/1.73M2
GLUCOSE SERPL-MCNC: 93 MG/DL
POTASSIUM SERPL-SCNC: 5.8 MMOL/L
PROT SERPL-MCNC: 7.8 G/DL
SODIUM SERPL-SCNC: 134 MMOL/L

## 2023-07-26 RX ORDER — SULFAMETHOXAZOLE AND TRIMETHOPRIM 800; 160 MG/1; MG/1
800-160 TABLET ORAL TWICE DAILY
Qty: 10 | Refills: 0 | Status: DISCONTINUED | COMMUNITY
Start: 2019-08-30 | End: 2023-07-26

## 2023-08-02 ENCOUNTER — APPOINTMENT (OUTPATIENT)
Dept: HEART AND VASCULAR | Facility: CLINIC | Age: 73
End: 2023-08-02
Payer: COMMERCIAL

## 2023-08-02 PROCEDURE — 99213 OFFICE O/P EST LOW 20 MIN: CPT | Mod: 25

## 2023-08-02 PROCEDURE — 93306 TTE W/DOPPLER COMPLETE: CPT

## 2023-08-02 NOTE — PHYSICAL EXAM
[Well Developed] : well developed [Well Nourished] : well nourished [No Acute Distress] : no acute distress [Normal Conjunctiva] : normal conjunctiva [Normal Gait] : normal gait [No Edema] : no edema [Moves all extremities] : moves all extremities [No Focal Deficits] : no focal deficits [Normal Speech] : normal speech [Alert and Oriented] : alert and oriented [Normal memory] : normal memory

## 2023-08-21 ENCOUNTER — TRANSCRIPTION ENCOUNTER (OUTPATIENT)
Age: 73
End: 2023-08-21

## 2023-09-17 ENCOUNTER — NON-APPOINTMENT (OUTPATIENT)
Age: 73
End: 2023-09-17

## 2023-09-18 ENCOUNTER — APPOINTMENT (OUTPATIENT)
Dept: INTERNAL MEDICINE | Facility: CLINIC | Age: 73
End: 2023-09-18
Payer: COMMERCIAL

## 2023-09-18 VITALS — SYSTOLIC BLOOD PRESSURE: 124 MMHG | HEART RATE: 64 BPM | RESPIRATION RATE: 16 BRPM | DIASTOLIC BLOOD PRESSURE: 80 MMHG

## 2023-09-18 PROCEDURE — 36415 COLL VENOUS BLD VENIPUNCTURE: CPT

## 2023-09-18 PROCEDURE — 99214 OFFICE O/P EST MOD 30 MIN: CPT | Mod: 25

## 2023-09-18 PROCEDURE — 90662 IIV NO PRSV INCREASED AG IM: CPT

## 2023-09-18 PROCEDURE — G0008: CPT

## 2023-09-19 LAB
ALBUMIN SERPL ELPH-MCNC: 4.9 G/DL
ALP BLD-CCNC: 131 U/L
ALT SERPL-CCNC: 28 U/L
ANION GAP SERPL CALC-SCNC: 12 MMOL/L
AST SERPL-CCNC: 40 U/L
BASOPHILS # BLD AUTO: 0.09 K/UL
BASOPHILS NFR BLD AUTO: 1.4 %
BILIRUB SERPL-MCNC: 0.7 MG/DL
BUN SERPL-MCNC: 15 MG/DL
CALCIUM SERPL-MCNC: 10.4 MG/DL
CHLORIDE SERPL-SCNC: 92 MMOL/L
CO2 SERPL-SCNC: 35 MMOL/L
CREAT SERPL-MCNC: 0.73 MG/DL
EGFR: 87 ML/MIN/1.73M2
EOSINOPHIL # BLD AUTO: 0.59 K/UL
EOSINOPHIL NFR BLD AUTO: 9 %
GLUCOSE SERPL-MCNC: 82 MG/DL
HCT VFR BLD CALC: 44.1 %
HGB BLD-MCNC: 13.9 G/DL
IMM GRANULOCYTES NFR BLD AUTO: 0.2 %
LYMPHOCYTES # BLD AUTO: 1.36 K/UL
LYMPHOCYTES NFR BLD AUTO: 20.7 %
MAN DIFF?: NORMAL
MCHC RBC-ENTMCNC: 31.5 GM/DL
MCHC RBC-ENTMCNC: 32 PG
MCV RBC AUTO: 101.6 FL
MONOCYTES # BLD AUTO: 1.09 K/UL
MONOCYTES NFR BLD AUTO: 16.6 %
NEUTROPHILS # BLD AUTO: 3.42 K/UL
NEUTROPHILS NFR BLD AUTO: 52.1 %
PLATELET # BLD AUTO: 267 K/UL
POTASSIUM SERPL-SCNC: 3.8 MMOL/L
PROT SERPL-MCNC: 7.1 G/DL
RBC # BLD: 4.34 M/UL
RBC # FLD: 14 %
SODIUM SERPL-SCNC: 139 MMOL/L
WBC # FLD AUTO: 6.56 K/UL

## 2023-10-10 ENCOUNTER — APPOINTMENT (OUTPATIENT)
Dept: INTERNAL MEDICINE | Facility: CLINIC | Age: 73
End: 2023-10-10
Payer: COMMERCIAL

## 2023-10-10 VITALS
BODY MASS INDEX: 17.77 KG/M2 | SYSTOLIC BLOOD PRESSURE: 159 MMHG | HEART RATE: 79 BPM | DIASTOLIC BLOOD PRESSURE: 88 MMHG | RESPIRATION RATE: 16 BRPM | WEIGHT: 120 LBS | TEMPERATURE: 98.2 F | OXYGEN SATURATION: 96 % | HEIGHT: 69 IN

## 2023-10-10 DIAGNOSIS — R05.2 SUBACUTE COUGH: ICD-10-CM

## 2023-10-10 PROCEDURE — 99213 OFFICE O/P EST LOW 20 MIN: CPT

## 2023-10-17 ENCOUNTER — APPOINTMENT (OUTPATIENT)
Dept: INTERNAL MEDICINE | Facility: CLINIC | Age: 73
End: 2023-10-17
Payer: COMMERCIAL

## 2023-10-17 VITALS
OXYGEN SATURATION: 95 % | WEIGHT: 120 LBS | RESPIRATION RATE: 15 BRPM | TEMPERATURE: 97.5 F | BODY MASS INDEX: 17.77 KG/M2 | SYSTOLIC BLOOD PRESSURE: 176 MMHG | HEIGHT: 69 IN | HEART RATE: 81 BPM | DIASTOLIC BLOOD PRESSURE: 99 MMHG

## 2023-10-17 PROCEDURE — 36415 COLL VENOUS BLD VENIPUNCTURE: CPT

## 2023-10-17 PROCEDURE — 99214 OFFICE O/P EST MOD 30 MIN: CPT | Mod: 25

## 2023-10-18 ENCOUNTER — NON-APPOINTMENT (OUTPATIENT)
Age: 73
End: 2023-10-18

## 2023-10-18 LAB
ALBUMIN SERPL ELPH-MCNC: 4.5 G/DL
ALP BLD-CCNC: 163 U/L
ALT SERPL-CCNC: 29 U/L
ANION GAP SERPL CALC-SCNC: 12 MMOL/L
AST SERPL-CCNC: 27 U/L
BASOPHILS # BLD AUTO: 0.09 K/UL
BASOPHILS NFR BLD AUTO: 1.2 %
BILIRUB SERPL-MCNC: 0.4 MG/DL
BUN SERPL-MCNC: 33 MG/DL
CALCIUM SERPL-MCNC: 9.8 MG/DL
CHLORIDE SERPL-SCNC: 100 MMOL/L
CO2 SERPL-SCNC: 24 MMOL/L
CREAT SERPL-MCNC: 1.04 MG/DL
EGFR: 57 ML/MIN/1.73M2
EOSINOPHIL # BLD AUTO: 0.72 K/UL
EOSINOPHIL NFR BLD AUTO: 9.6 %
GLUCOSE SERPL-MCNC: 95 MG/DL
HCT VFR BLD CALC: 42.5 %
HGB BLD-MCNC: 13.2 G/DL
IMM GRANULOCYTES NFR BLD AUTO: 0.3 %
LYMPHOCYTES # BLD AUTO: 1.78 K/UL
LYMPHOCYTES NFR BLD AUTO: 23.8 %
MAN DIFF?: NORMAL
MCHC RBC-ENTMCNC: 31.1 GM/DL
MCHC RBC-ENTMCNC: 32 PG
MCV RBC AUTO: 102.9 FL
MONOCYTES # BLD AUTO: 1 K/UL
MONOCYTES NFR BLD AUTO: 13.4 %
NEUTROPHILS # BLD AUTO: 3.88 K/UL
NEUTROPHILS NFR BLD AUTO: 51.7 %
PLATELET # BLD AUTO: 263 K/UL
POTASSIUM SERPL-SCNC: 5.7 MMOL/L
PROT SERPL-MCNC: 7.4 G/DL
RBC # BLD: 4.13 M/UL
RBC # FLD: 14.4 %
SODIUM SERPL-SCNC: 135 MMOL/L
WBC # FLD AUTO: 7.49 K/UL

## 2023-10-25 ENCOUNTER — APPOINTMENT (OUTPATIENT)
Dept: INTERNAL MEDICINE | Facility: CLINIC | Age: 73
End: 2023-10-25
Payer: COMMERCIAL

## 2023-10-25 VITALS
BODY MASS INDEX: 17.77 KG/M2 | HEIGHT: 69 IN | DIASTOLIC BLOOD PRESSURE: 88 MMHG | WEIGHT: 120 LBS | TEMPERATURE: 98.7 F | RESPIRATION RATE: 14 BRPM | HEART RATE: 71 BPM | OXYGEN SATURATION: 94 % | SYSTOLIC BLOOD PRESSURE: 158 MMHG

## 2023-10-25 PROCEDURE — 99213 OFFICE O/P EST LOW 20 MIN: CPT | Mod: 25

## 2023-10-25 PROCEDURE — 36415 COLL VENOUS BLD VENIPUNCTURE: CPT

## 2023-10-25 RX ORDER — FLUTICASONE PROPIONATE 50 UG/1
50 SPRAY, METERED NASAL DAILY
Qty: 1 | Refills: 3 | Status: ACTIVE | COMMUNITY
Start: 2023-10-25 | End: 1900-01-01

## 2023-10-26 LAB
ANION GAP SERPL CALC-SCNC: 12 MMOL/L
BUN SERPL-MCNC: 29 MG/DL
CALCIUM SERPL-MCNC: 10.3 MG/DL
CHLORIDE SERPL-SCNC: 97 MMOL/L
CO2 SERPL-SCNC: 26 MMOL/L
CREAT SERPL-MCNC: 1.11 MG/DL
EGFR: 52 ML/MIN/1.73M2
GLUCOSE SERPL-MCNC: 85 MG/DL
POTASSIUM SERPL-SCNC: 5.2 MMOL/L
SODIUM SERPL-SCNC: 136 MMOL/L

## 2023-11-07 ENCOUNTER — APPOINTMENT (OUTPATIENT)
Dept: VASCULAR SURGERY | Facility: CLINIC | Age: 73
End: 2023-11-07
Payer: COMMERCIAL

## 2023-11-07 VITALS
SYSTOLIC BLOOD PRESSURE: 163 MMHG | DIASTOLIC BLOOD PRESSURE: 73 MMHG | HEART RATE: 89 BPM | WEIGHT: 120 LBS | HEIGHT: 69 IN | BODY MASS INDEX: 17.77 KG/M2

## 2023-11-07 PROCEDURE — 93880 EXTRACRANIAL BILAT STUDY: CPT

## 2023-11-07 PROCEDURE — 99213 OFFICE O/P EST LOW 20 MIN: CPT

## 2023-11-14 ENCOUNTER — APPOINTMENT (OUTPATIENT)
Dept: INTERNAL MEDICINE | Facility: CLINIC | Age: 73
End: 2023-11-14
Payer: COMMERCIAL

## 2023-11-14 VITALS
WEIGHT: 120 LBS | BODY MASS INDEX: 17.77 KG/M2 | HEART RATE: 74 BPM | RESPIRATION RATE: 14 BRPM | HEIGHT: 69 IN | OXYGEN SATURATION: 97 % | TEMPERATURE: 98.1 F | DIASTOLIC BLOOD PRESSURE: 64 MMHG | SYSTOLIC BLOOD PRESSURE: 120 MMHG

## 2023-11-14 DIAGNOSIS — R33.9 RETENTION OF URINE, UNSPECIFIED: ICD-10-CM

## 2023-11-14 PROCEDURE — 99213 OFFICE O/P EST LOW 20 MIN: CPT

## 2023-12-05 ENCOUNTER — RX RENEWAL (OUTPATIENT)
Age: 73
End: 2023-12-05

## 2023-12-05 RX ORDER — ATORVASTATIN CALCIUM 40 MG/1
40 TABLET, FILM COATED ORAL
Qty: 90 | Refills: 3 | Status: ACTIVE | COMMUNITY
Start: 2019-06-27 | End: 1900-01-01

## 2023-12-15 NOTE — BRIEF OPERATIVE NOTE - OPERATION/FINDINGS
Infectious disease progress note    Patient: Theodore Naranjo               Sex: male            MRN:  [id number]      YOB: 1976      Age:  47 year old        Subjective  Feeling about the same today as he did yesterday.  Still feels like he is having looser stools.  No back pain.  Some continued gurgling in his stomach without any nausea or vomiting.    Visit Vitals  /73 (BP Location: RUE - Right upper extremity, Patient Position: Supine)   Pulse 92   Temp 98.2 °F (36.8 °C) (Oral)   Resp 18   Ht 5' 6\" (1.676 m)   Wt 98.1 kg (216 lb 4.8 oz)   SpO2 97%   BMI 34.91 kg/m²     General comfortable, nontoxic  CV regular rate rhythm  Pulm nonlabored and clear bilaterally  GI nontender and nondistended, soft; stool loose but somewhat formed/soft  Ext no edema  MSK surgical wound with slight serous discharge but overall CDI    Recent Labs     12/13/23 0444 12/14/23 0435 12/15/23  0452   WBC 14.9* 16.3* 16.5*   RBC 3.29* 3.09* 3.14*   HCT 27.6* 25.6* 26.1*   HGB 8.7* 8.2* 8.4*   * 806* 862*       Recent Labs     12/13/23 0444 12/14/23 0435 12/15/23  0452   SODIUM 135 136 137   POTASSIUM 4.3  4.2 4.1 3.9   CHLORIDE 103 101 103   GLUCOSE 87 90 89   CALCIUM 7.9* 7.9* 8.1*   CO2 27 28 30   BUN 10 14 13   CREATININE 0.32* 0.28* 0.31*   AST  --  100* 96*   GPT  --  87* 90*   ALKPT  --  181* 184*   BILIRUBIN  --  0.3 0.3       Impression  #Sepsis on arrival, due to below  #Acute C. difficile colitis, improving  #E. coli bacteremia on admission  -Likely gut translocation considering below  -Completed 14-day course of antibiotics  #Persistent leukocytosis, likely multifactorial  -Surgical wound CDI  #Recently diagnosed metastatic colon cancer with spinal mets status post decompressive laminectomy and fusion 12/5    Plan  Continue oral vancomycin 125 mg 4 times daily, day 6/10  -Will transition to fidaxomicin for an additional 10 days with clinical deterioration or worsening symptoms  Reviewed counts,  chemistries, cultures, and imaging  Oncology and neurosurgery following, appreciate input  Okay from ID perspective to move forward with any oncologic treatment  Discussed with Dr. Teofilo Coffey, [facility] [place]  [address]  [place]  [address]  Pager: [phone]  T [phone]  F [phone]    This note was partially or completely constructed using Dragon dictation technology. Errors in transcription can occur and may not be corrected prior to electronic signing. Please reach out directly with any concerns regarding these errors or questions regarding the note's content. Thank you.    Procedure: right carotid endarterectomy with patch angioplasty with Cryolife Photofix, performed under continuous EEG monitoring without any changes during ICA clamping. Patient was extubated and was moving all four extremities without neurodeficits postop.   Indication: asymptomatic significant right carotid artery stenosis.   IVF: 700cc  EBL: 100cc

## 2024-03-06 ENCOUNTER — APPOINTMENT (OUTPATIENT)
Dept: INTERNAL MEDICINE | Facility: CLINIC | Age: 74
End: 2024-03-06
Payer: COMMERCIAL

## 2024-03-06 ENCOUNTER — NON-APPOINTMENT (OUTPATIENT)
Age: 74
End: 2024-03-06

## 2024-03-06 VITALS
SYSTOLIC BLOOD PRESSURE: 157 MMHG | DIASTOLIC BLOOD PRESSURE: 74 MMHG | WEIGHT: 120 LBS | HEIGHT: 69 IN | RESPIRATION RATE: 14 BRPM | OXYGEN SATURATION: 100 % | HEART RATE: 58 BPM | TEMPERATURE: 97.7 F | BODY MASS INDEX: 17.77 KG/M2

## 2024-03-06 PROCEDURE — 93000 ELECTROCARDIOGRAM COMPLETE: CPT

## 2024-03-06 PROCEDURE — 36415 COLL VENOUS BLD VENIPUNCTURE: CPT

## 2024-03-06 PROCEDURE — 99214 OFFICE O/P EST MOD 30 MIN: CPT | Mod: 25

## 2024-03-06 RX ORDER — LORATADINE 10 MG/1
10 TABLET ORAL
Qty: 20 | Refills: 1 | Status: DISCONTINUED | COMMUNITY
Start: 2023-10-10 | End: 2024-03-06

## 2024-03-06 RX ORDER — PREDNISONE 50 MG/1
50 TABLET ORAL
Qty: 3 | Refills: 0 | Status: DISCONTINUED | COMMUNITY
Start: 2019-09-30 | End: 2024-03-06

## 2024-03-06 RX ORDER — GUAIFENESIN AND CODEINE PHOSPHATE 10; 100 MG/5ML; MG/5ML
100-10 SOLUTION ORAL
Qty: 120 | Refills: 0 | Status: DISCONTINUED | COMMUNITY
Start: 2023-10-10 | End: 2024-03-06

## 2024-03-06 RX ORDER — BENZONATATE 100 MG/1
100 CAPSULE ORAL
Qty: 30 | Refills: 4 | Status: DISCONTINUED | COMMUNITY
Start: 2023-09-20 | End: 2024-03-06

## 2024-03-06 RX ORDER — CAMPHOR 0.45 %
25 GEL (GRAM) TOPICAL
Qty: 2 | Refills: 0 | Status: DISCONTINUED | COMMUNITY
Start: 2019-09-30 | End: 2024-03-06

## 2024-03-06 RX ORDER — HYDROCHLOROTHIAZIDE 12.5 MG/1
12.5 TABLET ORAL DAILY
Qty: 90 | Refills: 3 | Status: DISCONTINUED | COMMUNITY
Start: 2023-10-17 | End: 2024-03-06

## 2024-03-06 RX ORDER — CIPROFLOXACIN HYDROCHLORIDE 500 MG/1
500 TABLET, FILM COATED ORAL
Qty: 14 | Refills: 0 | Status: DISCONTINUED | COMMUNITY
Start: 2019-09-03 | End: 2024-03-06

## 2024-03-07 LAB
ALBUMIN SERPL ELPH-MCNC: 4.5 G/DL
ALP BLD-CCNC: 137 U/L
ALT SERPL-CCNC: 45 U/L
ANION GAP SERPL CALC-SCNC: 14 MMOL/L
AST SERPL-CCNC: 38 U/L
BASOPHILS # BLD AUTO: 0.1 K/UL
BASOPHILS NFR BLD AUTO: 1.5 %
BILIRUB SERPL-MCNC: 0.4 MG/DL
BUN SERPL-MCNC: 32 MG/DL
CALCIUM SERPL-MCNC: 10.3 MG/DL
CHLORIDE SERPL-SCNC: 103 MMOL/L
CO2 SERPL-SCNC: 23 MMOL/L
CREAT SERPL-MCNC: 1.06 MG/DL
EGFR: 55 ML/MIN/1.73M2
EOSINOPHIL # BLD AUTO: 0.37 K/UL
EOSINOPHIL NFR BLD AUTO: 5.5 %
GLUCOSE SERPL-MCNC: 86 MG/DL
HCT VFR BLD CALC: 41.5 %
HGB BLD-MCNC: 13.2 G/DL
IMM GRANULOCYTES NFR BLD AUTO: 0.1 %
LYMPHOCYTES # BLD AUTO: 1.86 K/UL
LYMPHOCYTES NFR BLD AUTO: 27.5 %
MAN DIFF?: NORMAL
MCHC RBC-ENTMCNC: 31.8 GM/DL
MCHC RBC-ENTMCNC: 32 PG
MCV RBC AUTO: 100.5 FL
MONOCYTES # BLD AUTO: 0.76 K/UL
MONOCYTES NFR BLD AUTO: 11.2 %
NEUTROPHILS # BLD AUTO: 3.66 K/UL
NEUTROPHILS NFR BLD AUTO: 54.2 %
PLATELET # BLD AUTO: 255 K/UL
POTASSIUM SERPL-SCNC: 5.1 MMOL/L
PROT SERPL-MCNC: 7.3 G/DL
RBC # BLD: 4.13 M/UL
RBC # FLD: 14.3 %
SODIUM SERPL-SCNC: 140 MMOL/L
WBC # FLD AUTO: 6.76 K/UL

## 2024-03-07 NOTE — HISTORY OF PRESENT ILLNESS
[No Pertinent Cardiac History] : no history of aortic stenosis, atrial fibrillation, coronary artery disease, recent myocardial infarction, or implantable device/pacemaker [No Pertinent Pulmonary History] : no history of asthma, COPD, sleep apnea, or smoking [No Adverse Anesthesia Reaction] : no adverse anesthesia reaction in self or family member [(Patient denies any chest pain, claudication, dyspnea on exertion, orthopnea, palpitations or syncope)] : Patient denies any chest pain, claudication, dyspnea on exertion, orthopnea, palpitations or syncope [Excellent (>10 METs)] : Excellent (>10 METs) [FreeTextEntry1] : cataract surgery [FreeTextEntry2] : 3/18 and 4/5 [FreeTextEntry3] : Dr Conti [FreeTextEntry4] : 73 yoF with HTN, HLD, PAD , high grade L ICA stenosis s/p L CEA with roof patch on 10/2019 and high-grade R ICA stenosis s/p R CEA on 4/24/2021.  bp very well controlled, no dizziness.      Coughing persists, worse lying down Not sleeping well.  -  taking melatonin- - now up to 20mg.

## 2024-03-12 ENCOUNTER — OUTPATIENT (OUTPATIENT)
Dept: OUTPATIENT SERVICES | Facility: HOSPITAL | Age: 74
LOS: 1 days | End: 2024-03-12
Payer: COMMERCIAL

## 2024-03-12 DIAGNOSIS — Z41.9 ENCOUNTER FOR PROCEDURE FOR PURPOSES OTHER THAN REMEDYING HEALTH STATE, UNSPECIFIED: Chronic | ICD-10-CM

## 2024-03-12 DIAGNOSIS — Z90.89 ACQUIRED ABSENCE OF OTHER ORGANS: Chronic | ICD-10-CM

## 2024-03-12 DIAGNOSIS — Z87.59 PERSONAL HISTORY OF OTHER COMPLICATIONS OF PREGNANCY, CHILDBIRTH AND THE PUERPERIUM: Chronic | ICD-10-CM

## 2024-03-12 PROCEDURE — 71046 X-RAY EXAM CHEST 2 VIEWS: CPT

## 2024-03-12 PROCEDURE — 71046 X-RAY EXAM CHEST 2 VIEWS: CPT | Mod: 26

## 2024-03-19 ENCOUNTER — APPOINTMENT (OUTPATIENT)
Dept: OTOLARYNGOLOGY | Facility: CLINIC | Age: 74
End: 2024-03-19
Payer: COMMERCIAL

## 2024-03-19 VITALS
DIASTOLIC BLOOD PRESSURE: 84 MMHG | OXYGEN SATURATION: 94 % | HEART RATE: 80 BPM | WEIGHT: 120 LBS | BODY MASS INDEX: 17.77 KG/M2 | HEIGHT: 69 IN | TEMPERATURE: 97.3 F | SYSTOLIC BLOOD PRESSURE: 194 MMHG

## 2024-03-19 DIAGNOSIS — R49.0 DYSPHONIA: ICD-10-CM

## 2024-03-19 DIAGNOSIS — J34.89 OTHER SPECIFIED DISORDERS OF NOSE AND NASAL SINUSES: ICD-10-CM

## 2024-03-19 DIAGNOSIS — J38.3 OTHER DISEASES OF VOCAL CORDS: ICD-10-CM

## 2024-03-19 DIAGNOSIS — R09.82 POSTNASAL DRIP: ICD-10-CM

## 2024-03-19 DIAGNOSIS — K21.9 GASTRO-ESOPHAGEAL REFLUX DISEASE W/OUT ESOPHAGITIS: ICD-10-CM

## 2024-03-19 PROCEDURE — 31231 NASAL ENDOSCOPY DX: CPT | Mod: 59

## 2024-03-19 PROCEDURE — 31579 LARYNGOSCOPY TELESCOPIC: CPT

## 2024-03-19 PROCEDURE — 99205 OFFICE O/P NEW HI 60 MIN: CPT | Mod: 25

## 2024-03-19 RX ORDER — AZELASTINE HYDROCHLORIDE 137 UG/1
137 SPRAY, METERED NASAL DAILY
Qty: 3 | Refills: 1 | Status: ACTIVE | COMMUNITY
Start: 2024-03-19 | End: 1900-01-01

## 2024-03-19 NOTE — ASSESSMENT
[FreeTextEntry1] : - 72 y/o F presents with cough since 9/23. She reports she has intermittent cough throughout the day, it can be dry and also productive. She has known asthma and has shortness of breath on exertion. She has not seen a pulmonologist. She saw Dr. Rogers who diagnosed her with chronic cough after COVID. She had a recent CXR which revealed 1.8 cm left perihilar lung nodule and she is scheduled for upcoming chest CT. I am recommending consultation with pulmonologist and that she continues workup with Dr. Rogers. Based on history and physical exam, I do believe there is a component of laryngopharyngeal reflux. At this time I am recommending dietary and behavioral change to reduce acid in the diet and cough avoidance. I am also recommending omeprazole as well famotidine for a 3 months trial. Follow up in 3 months, if no improvement consider treatment for neurogenic cough at next visit.    She also has postnasal drip and rhinorrhea for the past 4 weeks. She has sinus pressure in her forehead area for the past day. On physical exam/ nasal endoscopy she was found to have mild inflammation I am recommending nasal saline rinses and nasal antihistamines as long she is clear from her ophthalmologist given her history of glaucoma.   -Consultation with pulmonologist, eval and optimize asthma -Dietary and behavioral modification to reduce acid reflux, handout given, cough avoidance  -Omeprazole qd as well as Famotidine qhs -Voice hygiene, increase hydration, sips of water throughout the day, avoid throat clearing -Nasal saline rinses and nasal antihistamines -Follow up in 3 months  -Continue to follow up with Dr. Rogers fu CT chest

## 2024-03-19 NOTE — PROCEDURE
[FreeTextEntry6] : -  Pre-operative Diagnosis: Postnasal drip  Post-operative Diagnosis: Mild inflammation  Anesthesia: Topical Procedure: Bilateral nasal endoscopy Procedure Details:   After topical anesthesia and decongestant, the patient was placed in the supine position. The telescope was passed along the left nasal floor to the nasopharynx. It was then passed into the region of the middle meatus, middle turbinate, and the sphenoethmoid region. An identical procedure was performed on the right side.   Findings: Mucosa:   normal Nasal septum: normal Discharge:  none Turbinates:  normal Adenoid:   normal Posterior choanae:  normal Eustachian tubes:  normal  Mucous stranding:  normal   Lesions:   Not present   Comments:   Condition: Stable. Patient tolerated procedure well. [de-identified] : -   Pre-operative Diagnosis: Dysphonia, Throat discomfort Post-operative Diagnosis: laryngopharyngeal reflux, right true vocal fold ectasia Anesthesia: Topical - 1 % Lidocaine/Phenylephrine Procedure: Flexible Laryngoscopy with Stroboscopy - CPT 85581   Procedure Details: The patient was placed in the sitting position. After decongestant and anesthesia were applied the laryngoscope was passed. The nasal cavities, nasopharynx, oropharynx, hypopharynx, and larynx were all examined. Vocal folds were examined during respiration and phonation. The following findings were noted:   Findings: Nose: Septum is midline, turbinates are normal, nasal airways patent, mucosa normal Nasopharynx: Adenoids normal, no masses, eustachian tube normal Oropharynx: Pharyngeal walls symmetric and without lesion. Tonsils/fossae symmetric Hypopharynx: Hypopharynx and pyriform sinuses without lesion. No masses or asymmetry. + pooling of secretions. Larynx: Epiglottis and aryepiglottic folds were sharp and crisp bilaterally. Bilateral false vocal folds normal appearance. Airway was widely patent. + postcricoid edema, erythema of the vocal process, right true vocal fold ectasia   Strobe Exam Ratings   TVF Appearance: right true vocal fold ectasia,  mild erythema of the vocal process TVF Mobility: normal Edema/hypertrophy: normal, + postcricoid edema Mucus on TVF: normal Glottic Closure: normal/adequate Mucosal Wave: normal Amplitude of Vibration: normal Phase: symmetric Supraglottic Hyperfunction: none Other Findings: none   Condition: Stable. Patient tolerated procedure well.   Complications: None

## 2024-03-19 NOTE — PHYSICAL EXAM
[Normal] : lingual tonsils are normal [Midline] : trachea located in midline position [FreeTextEntry1] : good projection/ range

## 2024-03-19 NOTE — HISTORY OF PRESENT ILLNESS
[de-identified] : 3/19/24: 74 y/o F presents with cough since 9/23. She reports she has intermittent cough throughout the day, it can be dry and also productive. No issues eating, chewing, or swallowing. She has known asthma and has shortness of breath on exertion. She has not seen a pulmonologist. No regurgitation of food. She saw Dr. Rogers who diagnosed her with chronic cough after COVID. She had a recent CXR which revealed 1.8 cm left perihilar lung nodule and she is scheduled for upcoming chest CT.  For treatment she has tried Tessalon Perles, Codeine cough suppressant, and an inhaler. She has secondhand smoke exposure from her father smoking. Immediately after cough her voice is hoarse, and when she stops coughing it resolves. Retired, used to work in fashion industry. No significant voice demands.  Diet: +coffee, tomato, garlic, onion, blueberry -tea, soda, chocolate, mint, citrus, spicy food, carbonated beverages water intake: 4 glasses late night eating: none  She also has postnasal drip and rhinorrhea for the past 4 weeks. She has sinus pressure in her forehead area for the past day. She denies changes in smell or taste, or nasal congestion. For treatments she used Flonase for 10 days (she has glaucoma and noticed no difference in this). She has been diagnosed with seasonal allergies and has seen an allergist in the past.

## 2024-03-20 ENCOUNTER — APPOINTMENT (OUTPATIENT)
Dept: CT IMAGING | Facility: HOSPITAL | Age: 74
End: 2024-03-20

## 2024-03-20 ENCOUNTER — OUTPATIENT (OUTPATIENT)
Dept: OUTPATIENT SERVICES | Facility: HOSPITAL | Age: 74
LOS: 1 days | End: 2024-03-20
Payer: COMMERCIAL

## 2024-03-20 DIAGNOSIS — Z90.89 ACQUIRED ABSENCE OF OTHER ORGANS: Chronic | ICD-10-CM

## 2024-03-20 DIAGNOSIS — Z41.9 ENCOUNTER FOR PROCEDURE FOR PURPOSES OTHER THAN REMEDYING HEALTH STATE, UNSPECIFIED: Chronic | ICD-10-CM

## 2024-03-20 DIAGNOSIS — Z87.59 PERSONAL HISTORY OF OTHER COMPLICATIONS OF PREGNANCY, CHILDBIRTH AND THE PUERPERIUM: Chronic | ICD-10-CM

## 2024-03-20 PROCEDURE — 71250 CT THORAX DX C-: CPT

## 2024-03-20 PROCEDURE — 71250 CT THORAX DX C-: CPT | Mod: 26

## 2024-03-22 ENCOUNTER — NON-APPOINTMENT (OUTPATIENT)
Age: 74
End: 2024-03-22

## 2024-03-22 NOTE — BRIEF OPERATIVE NOTE - ANESTHESIOLOGIST NAME
PT CALLED BACK AND SAID SHE DOES NOT NEED CARDIAC CLEARANCE, JUST F/U. SCHEDULED FIRST AVAILABLE APPT   Meka

## 2024-03-25 ENCOUNTER — NON-APPOINTMENT (OUTPATIENT)
Age: 74
End: 2024-03-25

## 2024-04-03 ENCOUNTER — OUTPATIENT (OUTPATIENT)
Dept: OUTPATIENT SERVICES | Facility: HOSPITAL | Age: 74
LOS: 1 days | End: 2024-04-03
Payer: COMMERCIAL

## 2024-04-03 ENCOUNTER — APPOINTMENT (OUTPATIENT)
Dept: NUCLEAR MEDICINE | Facility: HOSPITAL | Age: 74
End: 2024-04-03

## 2024-04-03 ENCOUNTER — NON-APPOINTMENT (OUTPATIENT)
Age: 74
End: 2024-04-03

## 2024-04-03 DIAGNOSIS — Z41.9 ENCOUNTER FOR PROCEDURE FOR PURPOSES OTHER THAN REMEDYING HEALTH STATE, UNSPECIFIED: Chronic | ICD-10-CM

## 2024-04-03 DIAGNOSIS — Z90.89 ACQUIRED ABSENCE OF OTHER ORGANS: Chronic | ICD-10-CM

## 2024-04-03 DIAGNOSIS — Z87.59 PERSONAL HISTORY OF OTHER COMPLICATIONS OF PREGNANCY, CHILDBIRTH AND THE PUERPERIUM: Chronic | ICD-10-CM

## 2024-04-03 LAB — GLUCOSE BLDC GLUCOMTR-MCNC: 83 MG/DL — SIGNIFICANT CHANGE UP (ref 70–99)

## 2024-04-03 PROCEDURE — A9552: CPT

## 2024-04-03 PROCEDURE — 82962 GLUCOSE BLOOD TEST: CPT

## 2024-04-03 PROCEDURE — 78815 PET IMAGE W/CT SKULL-THIGH: CPT

## 2024-04-03 PROCEDURE — 78815 PET IMAGE W/CT SKULL-THIGH: CPT | Mod: 26,PI

## 2024-04-10 ENCOUNTER — APPOINTMENT (OUTPATIENT)
Dept: PULMONOLOGY | Facility: CLINIC | Age: 74
End: 2024-04-10
Payer: COMMERCIAL

## 2024-04-10 VITALS
OXYGEN SATURATION: 94 % | HEIGHT: 69 IN | TEMPERATURE: 98 F | SYSTOLIC BLOOD PRESSURE: 120 MMHG | WEIGHT: 120 LBS | DIASTOLIC BLOOD PRESSURE: 82 MMHG | HEART RATE: 76 BPM | BODY MASS INDEX: 17.77 KG/M2

## 2024-04-10 PROCEDURE — 94727 GAS DIL/WSHOT DETER LNG VOL: CPT

## 2024-04-10 PROCEDURE — 94060 EVALUATION OF WHEEZING: CPT

## 2024-04-10 PROCEDURE — 99205 OFFICE O/P NEW HI 60 MIN: CPT | Mod: 25

## 2024-04-10 PROCEDURE — 95012 NITRIC OXIDE EXP GAS DETER: CPT

## 2024-04-10 PROCEDURE — 94729 DIFFUSING CAPACITY: CPT

## 2024-04-11 ENCOUNTER — APPOINTMENT (OUTPATIENT)
Dept: PULMONOLOGY | Facility: CLINIC | Age: 74
End: 2024-04-11
Payer: COMMERCIAL

## 2024-04-11 DIAGNOSIS — R05.3 CHRONIC COUGH: ICD-10-CM

## 2024-04-11 PROCEDURE — 99442: CPT | Mod: 93

## 2024-04-15 ENCOUNTER — TRANSCRIPTION ENCOUNTER (OUTPATIENT)
Age: 74
End: 2024-04-15

## 2024-04-17 ENCOUNTER — APPOINTMENT (OUTPATIENT)
Dept: INTERNAL MEDICINE | Facility: CLINIC | Age: 74
End: 2024-04-17
Payer: COMMERCIAL

## 2024-04-17 VITALS
OXYGEN SATURATION: 94 % | HEART RATE: 84 BPM | HEIGHT: 69 IN | BODY MASS INDEX: 17.77 KG/M2 | TEMPERATURE: 98.2 F | SYSTOLIC BLOOD PRESSURE: 124 MMHG | RESPIRATION RATE: 14 BRPM | WEIGHT: 120 LBS | DIASTOLIC BLOOD PRESSURE: 68 MMHG

## 2024-04-17 PROCEDURE — 99214 OFFICE O/P EST MOD 30 MIN: CPT

## 2024-04-17 PROCEDURE — 36415 COLL VENOUS BLD VENIPUNCTURE: CPT

## 2024-04-18 ENCOUNTER — TRANSCRIPTION ENCOUNTER (OUTPATIENT)
Age: 74
End: 2024-04-18

## 2024-04-18 NOTE — HISTORY OF PRESENT ILLNESS
[No Pertinent Cardiac History] : no history of aortic stenosis, atrial fibrillation, coronary artery disease, recent myocardial infarction, or implantable device/pacemaker [No Pertinent Pulmonary History] : no history of asthma, COPD, sleep apnea, or smoking [No Adverse Anesthesia Reaction] : no adverse anesthesia reaction in self or family member [Good (7-10 METs)] : Good (7-10 METs) [FreeTextEntry1] : Bronchoscopy [FreeTextEntry2] : 4/19/24 [FreeTextEntry3] : Dr Garcia [FreeTextEntry4] : 72 yo F with HTN, HLD, PAD , high grade L ICA stenosis s/p L CEA with roof patch on 10/2019 and high-grade R ICA stenosis s/p R CEA on 4/24/2021.  bp very well controlled, no dizziness.   Here today for pre-operative evaluation for a bronchoscopy, part of the workup of a lung nodule. URI - symptoms - for the past 4-5 days.  productive cough, mucous, no fever or chills,  nose is clogged.   ECHO 8/23: EF 63% without significant structural abnormalities EKG 3/24: NSR @ 71bpm

## 2024-04-19 ENCOUNTER — RESULT REVIEW (OUTPATIENT)
Age: 74
End: 2024-04-19

## 2024-04-19 ENCOUNTER — APPOINTMENT (OUTPATIENT)
Dept: PULMONOLOGY | Facility: HOSPITAL | Age: 74
End: 2024-04-19

## 2024-04-19 ENCOUNTER — OUTPATIENT (OUTPATIENT)
Dept: OUTPATIENT SERVICES | Facility: HOSPITAL | Age: 74
LOS: 1 days | Discharge: HOME | End: 2024-04-19
Payer: COMMERCIAL

## 2024-04-19 ENCOUNTER — NON-APPOINTMENT (OUTPATIENT)
Age: 74
End: 2024-04-19

## 2024-04-19 VITALS — WEIGHT: 125.66 LBS | HEIGHT: 69 IN

## 2024-04-19 DIAGNOSIS — Z41.9 ENCOUNTER FOR PROCEDURE FOR PURPOSES OTHER THAN REMEDYING HEALTH STATE, UNSPECIFIED: Chronic | ICD-10-CM

## 2024-04-19 DIAGNOSIS — Z87.59 PERSONAL HISTORY OF OTHER COMPLICATIONS OF PREGNANCY, CHILDBIRTH AND THE PUERPERIUM: Chronic | ICD-10-CM

## 2024-04-19 DIAGNOSIS — Z90.89 ACQUIRED ABSENCE OF OTHER ORGANS: Chronic | ICD-10-CM

## 2024-04-19 LAB
ALBUMIN SERPL ELPH-MCNC: 4.2 G/DL
ALP BLD-CCNC: 170 U/L
ALT SERPL-CCNC: 28 U/L
ANION GAP SERPL CALC-SCNC: 15 MMOL/L
APTT BLD: 30.3 SEC
AST SERPL-CCNC: 27 U/L
BASOPHILS # BLD AUTO: 0.07 K/UL
BASOPHILS NFR BLD AUTO: 0.7 %
BILIRUB SERPL-MCNC: 0.4 MG/DL
BUN SERPL-MCNC: 28 MG/DL
CALCIUM SERPL-MCNC: 9.2 MG/DL
CHLORIDE SERPL-SCNC: 99 MMOL/L
CO2 SERPL-SCNC: 20 MMOL/L
CREAT SERPL-MCNC: 1.13 MG/DL
EGFR: 51 ML/MIN/1.73M2
EOSINOPHIL # BLD AUTO: 0.15 K/UL
EOSINOPHIL NFR BLD AUTO: 1.4 %
GLUCOSE SERPL-MCNC: 126 MG/DL
HCT VFR BLD CALC: 38.6 %
HGB BLD-MCNC: 12.2 G/DL
IMM GRANULOCYTES NFR BLD AUTO: 0.4 %
INR PPP: 0.92 RATIO
LYMPHOCYTES # BLD AUTO: 1.49 K/UL
LYMPHOCYTES NFR BLD AUTO: 13.9 %
MAN DIFF?: NORMAL
MCHC RBC-ENTMCNC: 31.2 PG
MCHC RBC-ENTMCNC: 31.6 GM/DL
MCV RBC AUTO: 98.7 FL
MONOCYTES # BLD AUTO: 1.08 K/UL
MONOCYTES NFR BLD AUTO: 10.1 %
NEUTROPHILS # BLD AUTO: 7.87 K/UL
NEUTROPHILS NFR BLD AUTO: 73.5 %
PLATELET # BLD AUTO: 301 K/UL
POTASSIUM SERPL-SCNC: 5.1 MMOL/L
PROT SERPL-MCNC: 7.1 G/DL
PT BLD: 10.5 SEC
RBC # BLD: 3.91 M/UL
RBC # FLD: 14 %
SODIUM SERPL-SCNC: 133 MMOL/L
WBC # FLD AUTO: 10.7 K/UL

## 2024-04-19 PROCEDURE — 88341 IMHCHEM/IMCYTCHM EA ADD ANTB: CPT

## 2024-04-19 PROCEDURE — 88112 CYTOPATH CELL ENHANCE TECH: CPT | Mod: 26,59

## 2024-04-19 PROCEDURE — 88342 IMHCHEM/IMCYTCHM 1ST ANTB: CPT | Mod: 26

## 2024-04-19 PROCEDURE — 88173 CYTOPATH EVAL FNA REPORT: CPT | Mod: 26

## 2024-04-19 PROCEDURE — 88305 TISSUE EXAM BY PATHOLOGIST: CPT | Mod: 26

## 2024-04-19 PROCEDURE — 88341 IMHCHEM/IMCYTCHM EA ADD ANTB: CPT | Mod: 26

## 2024-04-19 PROCEDURE — 88173 CYTOPATH EVAL FNA REPORT: CPT

## 2024-04-19 PROCEDURE — 88360 TUMOR IMMUNOHISTOCHEM/MANUAL: CPT

## 2024-04-19 PROCEDURE — 31645 BRNCHSC W/THER ASPIR 1ST: CPT

## 2024-04-19 PROCEDURE — 31653 BRONCH EBUS SAMPLNG 3/> NODE: CPT | Mod: GC

## 2024-04-19 PROCEDURE — 88342 IMHCHEM/IMCYTCHM 1ST ANTB: CPT

## 2024-04-19 PROCEDURE — 31624 DX BRONCHOSCOPE/LAVAGE: CPT | Mod: GC

## 2024-04-19 PROCEDURE — 88112 CYTOPATH CELL ENHANCE TECH: CPT

## 2024-04-19 PROCEDURE — 31653 BRONCH EBUS SAMPLNG 3/> NODE: CPT

## 2024-04-19 PROCEDURE — 71045 X-RAY EXAM CHEST 1 VIEW: CPT

## 2024-04-19 PROCEDURE — 71045 X-RAY EXAM CHEST 1 VIEW: CPT | Mod: 26

## 2024-04-19 PROCEDURE — 31627 NAVIGATIONAL BRONCHOSCOPY: CPT | Mod: GC

## 2024-04-19 PROCEDURE — 31624 DX BRONCHOSCOPE/LAVAGE: CPT

## 2024-04-19 PROCEDURE — 31628 BRONCHOSCOPY/LUNG BX EACH: CPT | Mod: GC

## 2024-04-19 PROCEDURE — S2900: CPT

## 2024-04-19 PROCEDURE — 88305 TISSUE EXAM BY PATHOLOGIST: CPT

## 2024-04-19 PROCEDURE — 31629 BRONCHOSCOPY/NEEDLE BX EACH: CPT | Mod: GC

## 2024-04-19 PROCEDURE — 31629 BRONCHOSCOPY/NEEDLE BX EACH: CPT

## 2024-04-23 LAB — SURGICAL PATHOLOGY STUDY: SIGNIFICANT CHANGE UP

## 2024-04-24 LAB
NON-GYNECOLOGICAL CYTOLOGY STUDY: SIGNIFICANT CHANGE UP

## 2024-04-26 RX ORDER — TRAZODONE HYDROCHLORIDE 50 MG/1
50 TABLET ORAL
Qty: 90 | Refills: 3 | Status: ACTIVE | COMMUNITY
Start: 2024-03-06 | End: 1900-01-01

## 2024-05-07 ENCOUNTER — OUTPATIENT (OUTPATIENT)
Dept: OUTPATIENT SERVICES | Facility: HOSPITAL | Age: 74
LOS: 1 days | End: 2024-05-07
Payer: COMMERCIAL

## 2024-05-07 DIAGNOSIS — Z41.9 ENCOUNTER FOR PROCEDURE FOR PURPOSES OTHER THAN REMEDYING HEALTH STATE, UNSPECIFIED: Chronic | ICD-10-CM

## 2024-05-07 DIAGNOSIS — Z90.89 ACQUIRED ABSENCE OF OTHER ORGANS: Chronic | ICD-10-CM

## 2024-05-07 DIAGNOSIS — D3A.090 BENIGN CARCINOID TUMOR OF THE BRONCHUS AND LUNG: ICD-10-CM

## 2024-05-07 DIAGNOSIS — Z87.59 PERSONAL HISTORY OF OTHER COMPLICATIONS OF PREGNANCY, CHILDBIRTH AND THE PUERPERIUM: Chronic | ICD-10-CM

## 2024-05-07 PROCEDURE — 94621 CARDIOPULM EXERCISE TESTING: CPT | Mod: 26

## 2024-05-07 RX ORDER — ALBUTEROL 90 UG/1
2.5 AEROSOL, METERED ORAL ONCE
Refills: 0 | Status: DISCONTINUED | OUTPATIENT
Start: 2024-05-07 | End: 2024-05-21

## 2024-05-08 ENCOUNTER — NON-APPOINTMENT (OUTPATIENT)
Age: 74
End: 2024-05-08

## 2024-05-08 RX ORDER — FLUTICASONE FUROATE, UMECLIDINIUM BROMIDE AND VILANTEROL TRIFENATATE 100; 62.5; 25 UG/1; UG/1; UG/1
100-62.5-25 POWDER RESPIRATORY (INHALATION)
Qty: 3 | Refills: 3 | Status: ACTIVE | COMMUNITY
Start: 2024-04-11 | End: 1900-01-01

## 2024-05-09 ENCOUNTER — OUTPATIENT (OUTPATIENT)
Dept: OUTPATIENT SERVICES | Facility: HOSPITAL | Age: 74
LOS: 1 days | End: 2024-05-09

## 2024-05-09 ENCOUNTER — NON-APPOINTMENT (OUTPATIENT)
Age: 74
End: 2024-05-09

## 2024-05-09 ENCOUNTER — APPOINTMENT (OUTPATIENT)
Dept: THORACIC SURGERY | Facility: CLINIC | Age: 74
End: 2024-05-09
Payer: COMMERCIAL

## 2024-05-09 VITALS
DIASTOLIC BLOOD PRESSURE: 70 MMHG | SYSTOLIC BLOOD PRESSURE: 157 MMHG | HEIGHT: 69 IN | WEIGHT: 122 LBS | BODY MASS INDEX: 18.07 KG/M2 | TEMPERATURE: 96.9 F | HEART RATE: 70 BPM | OXYGEN SATURATION: 98 %

## 2024-05-09 DIAGNOSIS — Z41.9 ENCOUNTER FOR PROCEDURE FOR PURPOSES OTHER THAN REMEDYING HEALTH STATE, UNSPECIFIED: Chronic | ICD-10-CM

## 2024-05-09 DIAGNOSIS — Z87.09 PERSONAL HISTORY OF OTHER DISEASES OF THE RESPIRATORY SYSTEM: ICD-10-CM

## 2024-05-09 DIAGNOSIS — Z87.59 PERSONAL HISTORY OF OTHER COMPLICATIONS OF PREGNANCY, CHILDBIRTH AND THE PUERPERIUM: Chronic | ICD-10-CM

## 2024-05-09 DIAGNOSIS — D3A.090 BENIGN CARCINOID TUMOR OF THE BRONCHUS AND LUNG: ICD-10-CM

## 2024-05-09 DIAGNOSIS — Z01.818 ENCOUNTER FOR OTHER PREPROCEDURAL EXAMINATION: ICD-10-CM

## 2024-05-09 DIAGNOSIS — Z90.89 ACQUIRED ABSENCE OF OTHER ORGANS: Chronic | ICD-10-CM

## 2024-05-09 DIAGNOSIS — Z85.820 PERSONAL HISTORY OF MALIGNANT MELANOMA OF SKIN: ICD-10-CM

## 2024-05-09 DIAGNOSIS — R91.1 SOLITARY PULMONARY NODULE: ICD-10-CM

## 2024-05-09 LAB
ALBUMIN SERPL ELPH-MCNC: 4.3 G/DL — SIGNIFICANT CHANGE UP (ref 3.3–5)
ALP SERPL-CCNC: 130 U/L — HIGH (ref 40–120)
ALT FLD-CCNC: 24 U/L — SIGNIFICANT CHANGE UP (ref 10–45)
ANION GAP SERPL CALC-SCNC: 13 MMOL/L — SIGNIFICANT CHANGE UP (ref 5–17)
APPEARANCE UR: CLEAR — SIGNIFICANT CHANGE UP
APTT BLD: 29.9 SEC — SIGNIFICANT CHANGE UP (ref 24.5–35.6)
AST SERPL-CCNC: 28 U/L — SIGNIFICANT CHANGE UP (ref 10–40)
BACTERIA # UR AUTO: ABNORMAL /HPF
BASOPHILS # BLD AUTO: 0.04 K/UL — SIGNIFICANT CHANGE UP (ref 0–0.2)
BASOPHILS NFR BLD AUTO: 0.6 % — SIGNIFICANT CHANGE UP (ref 0–2)
BILIRUB SERPL-MCNC: 0.6 MG/DL — SIGNIFICANT CHANGE UP (ref 0.2–1.2)
BILIRUB UR-MCNC: NEGATIVE — SIGNIFICANT CHANGE UP
BLD GP AB SCN SERPL QL: NEGATIVE — SIGNIFICANT CHANGE UP
BUN SERPL-MCNC: 32 MG/DL — HIGH (ref 7–23)
CALCIUM SERPL-MCNC: 9.8 MG/DL — SIGNIFICANT CHANGE UP (ref 8.4–10.5)
CHLORIDE SERPL-SCNC: 99 MMOL/L — SIGNIFICANT CHANGE UP (ref 96–108)
CO2 SERPL-SCNC: 24 MMOL/L — SIGNIFICANT CHANGE UP (ref 22–31)
COLOR SPEC: YELLOW — SIGNIFICANT CHANGE UP
CREAT SERPL-MCNC: 1.24 MG/DL — SIGNIFICANT CHANGE UP (ref 0.5–1.3)
DIFF PNL FLD: NEGATIVE — SIGNIFICANT CHANGE UP
EGFR: 46 ML/MIN/1.73M2 — LOW
EOSINOPHIL # BLD AUTO: 0.21 K/UL — SIGNIFICANT CHANGE UP (ref 0–0.5)
EOSINOPHIL NFR BLD AUTO: 3.4 % — SIGNIFICANT CHANGE UP (ref 0–6)
GLUCOSE SERPL-MCNC: 95 MG/DL — SIGNIFICANT CHANGE UP (ref 70–99)
GLUCOSE UR QL: NEGATIVE MG/DL — SIGNIFICANT CHANGE UP
HCT VFR BLD CALC: 38.5 % — SIGNIFICANT CHANGE UP (ref 34.5–45)
HGB BLD-MCNC: 13 G/DL — SIGNIFICANT CHANGE UP (ref 11.5–15.5)
IMM GRANULOCYTES NFR BLD AUTO: 0.2 % — SIGNIFICANT CHANGE UP (ref 0–0.9)
INR BLD: 0.83 — LOW (ref 0.85–1.18)
KETONES UR-MCNC: NEGATIVE MG/DL — SIGNIFICANT CHANGE UP
LEUKOCYTE ESTERASE UR-ACNC: ABNORMAL
LYMPHOCYTES # BLD AUTO: 1.23 K/UL — SIGNIFICANT CHANGE UP (ref 1–3.3)
LYMPHOCYTES # BLD AUTO: 19.8 % — SIGNIFICANT CHANGE UP (ref 13–44)
MCHC RBC-ENTMCNC: 32.4 PG — SIGNIFICANT CHANGE UP (ref 27–34)
MCHC RBC-ENTMCNC: 33.8 GM/DL — SIGNIFICANT CHANGE UP (ref 32–36)
MCV RBC AUTO: 96 FL — SIGNIFICANT CHANGE UP (ref 80–100)
MONOCYTES # BLD AUTO: 0.64 K/UL — SIGNIFICANT CHANGE UP (ref 0–0.9)
MONOCYTES NFR BLD AUTO: 10.3 % — SIGNIFICANT CHANGE UP (ref 2–14)
NEUTROPHILS # BLD AUTO: 4.09 K/UL — SIGNIFICANT CHANGE UP (ref 1.8–7.4)
NEUTROPHILS NFR BLD AUTO: 65.7 % — SIGNIFICANT CHANGE UP (ref 43–77)
NITRITE UR-MCNC: NEGATIVE — SIGNIFICANT CHANGE UP
NRBC # BLD: 0 /100 WBCS — SIGNIFICANT CHANGE UP (ref 0–0)
PH UR: 6 — SIGNIFICANT CHANGE UP (ref 5–8)
PLATELET # BLD AUTO: 242 K/UL — SIGNIFICANT CHANGE UP (ref 150–400)
POTASSIUM SERPL-MCNC: 4.7 MMOL/L — SIGNIFICANT CHANGE UP (ref 3.5–5.3)
POTASSIUM SERPL-SCNC: 4.7 MMOL/L — SIGNIFICANT CHANGE UP (ref 3.5–5.3)
PROT SERPL-MCNC: 7.4 G/DL — SIGNIFICANT CHANGE UP (ref 6–8.3)
PROT UR-MCNC: NEGATIVE MG/DL — SIGNIFICANT CHANGE UP
PROTHROM AB SERPL-ACNC: 9.5 SEC — SIGNIFICANT CHANGE UP (ref 9.5–13)
RBC # BLD: 4.01 M/UL — SIGNIFICANT CHANGE UP (ref 3.8–5.2)
RBC # FLD: 14.2 % — SIGNIFICANT CHANGE UP (ref 10.3–14.5)
RBC CASTS # UR COMP ASSIST: 1 /HPF — SIGNIFICANT CHANGE UP (ref 0–4)
RH IG SCN BLD-IMP: POSITIVE — SIGNIFICANT CHANGE UP
SODIUM SERPL-SCNC: 136 MMOL/L — SIGNIFICANT CHANGE UP (ref 135–145)
SP GR SPEC: 1.01 — SIGNIFICANT CHANGE UP (ref 1–1.03)
SQUAMOUS # UR AUTO: 1 /HPF — SIGNIFICANT CHANGE UP (ref 0–5)
UROBILINOGEN FLD QL: 0.2 MG/DL — SIGNIFICANT CHANGE UP (ref 0.2–1)
WBC # BLD: 6.22 K/UL — SIGNIFICANT CHANGE UP (ref 3.8–10.5)
WBC # FLD AUTO: 6.22 K/UL — SIGNIFICANT CHANGE UP (ref 3.8–10.5)
WBC UR QL: 20 /HPF — HIGH (ref 0–5)

## 2024-05-09 PROCEDURE — 36415 COLL VENOUS BLD VENIPUNCTURE: CPT

## 2024-05-09 PROCEDURE — 86850 RBC ANTIBODY SCREEN: CPT

## 2024-05-09 PROCEDURE — 86901 BLOOD TYPING SEROLOGIC RH(D): CPT

## 2024-05-09 PROCEDURE — 94621 CARDIOPULM EXERCISE TESTING: CPT

## 2024-05-09 PROCEDURE — 81001 URINALYSIS AUTO W/SCOPE: CPT

## 2024-05-09 PROCEDURE — 85610 PROTHROMBIN TIME: CPT

## 2024-05-09 PROCEDURE — 99205 OFFICE O/P NEW HI 60 MIN: CPT

## 2024-05-09 PROCEDURE — 85025 COMPLETE CBC W/AUTO DIFF WBC: CPT

## 2024-05-09 PROCEDURE — 86900 BLOOD TYPING SEROLOGIC ABO: CPT

## 2024-05-09 PROCEDURE — 80053 COMPREHEN METABOLIC PANEL: CPT

## 2024-05-09 PROCEDURE — 85730 THROMBOPLASTIN TIME PARTIAL: CPT

## 2024-05-10 PROBLEM — Z87.09 HISTORY OF CHRONIC OBSTRUCTIVE LUNG DISEASE: Status: RESOLVED | Noted: 2024-05-10 | Resolved: 2024-05-10

## 2024-05-10 PROBLEM — Z85.820 HISTORY OF MALIGNANT MELANOMA: Status: RESOLVED | Noted: 2024-05-10 | Resolved: 2024-05-10

## 2024-05-10 RX ORDER — DIPHENHYDRAMINE HCL 25 MG/1
25 TABLET ORAL
Qty: 2 | Refills: 0 | Status: ACTIVE | COMMUNITY
Start: 2024-03-25 | End: 1900-01-01

## 2024-05-10 RX ORDER — FAMOTIDINE 20 MG/1
20 TABLET, FILM COATED ORAL DAILY
Qty: 30 | Refills: 3 | Status: DISCONTINUED | COMMUNITY
Start: 2024-03-19 | End: 2024-05-10

## 2024-05-10 RX ORDER — OMEPRAZOLE 40 MG/1
40 CAPSULE, DELAYED RELEASE ORAL
Qty: 30 | Refills: 3 | Status: DISCONTINUED | COMMUNITY
Start: 2024-03-19 | End: 2024-05-10

## 2024-05-10 RX ORDER — IPRATROPIUM BROMIDE 17 UG/1
17 AEROSOL, METERED RESPIRATORY (INHALATION) 4 TIMES DAILY
Qty: 1 | Refills: 11 | Status: DISCONTINUED | COMMUNITY
Start: 2023-10-17 | End: 2024-05-10

## 2024-05-10 RX ORDER — PREDNISONE 50 MG/1
50 TABLET ORAL
Qty: 3 | Refills: 0 | Status: ACTIVE | COMMUNITY
Start: 2024-03-25 | End: 1900-01-01

## 2024-05-10 NOTE — CONSULT LETTER
[FreeTextEntry2] : DR. MARYSOL GRIFFIN [FreeTextEntry3] : Otoniel Rader MD   Attending Thoracic Surgeon  Department of Cardiovascular and Thoracic Surgery   of Cardiothoracic Surgery  [employer] [facility], Division of Thoracic Surgery  [address] [address]  Tel: [phone]  Fax: [phone]

## 2024-05-10 NOTE — HISTORY OF PRESENT ILLNESS
[FreeTextEntry1] : Patient is a 72 yo female, never smoker, with a PMHx of HTN, HLD, PAD, high grade L ICA stenosis s/p L CEA with roof patch on 10/2019 and high-grade R ICA stenosis s/p R CEA on 4/24/2021. Additionally, she has a hx of cancer melanoma 2x: 10+years ago.  She is referred by Dr. Kaelyn Harris for surgical evaluation of a neuroendocrine tumor, confirmed through robotic assisted bronchoscopy, ebus, tbna on 04/19 with Dr. Garcia .    Results are as follows;  Pathology 4/19/24: Lung, lingula, biopsy:-  Neuroendocrine tumor, favor grade 2 LYMPH NODE, 11L, FNA: NEGATIVE FOR MALIGNANT CELLS LYMPH NODE, 4L, FNA: NEGATIVE FOR MALIGNANT CELL LYMPH NODE, 7, EBUS-GUIDED FNA: NEGATIVE FOR MALIGNANT CELLS. LYMPH NODE, 11RS, FNA: NEGATIVE FOR MALIGNANT CELLS LUNG, LINGULA, BAL: NEGATIVE FOR MALIGNANT CELLS LUNG, LINGULA, FNA: POSITIVE FOR MALIGNANT CELLS  PFT done on 4/10/24 showed FEV1 = 1.41, 56% of predicted value, FVC =2.38, 71% of predicted value, PEF =3.24, 54% of predicted value and DLCO =7.44, 32% of predicted value.   PET/CT 4/3/24: 1. Hypermetabolic 16 mm nodule at the posterior left upper lobe, SUV max 7.4, suspicious for malignancy. Biopsy may be of benefit. Otherwise, close follow-up is recommended to evaluate for persistence. 2. Scattered FDG avid nodular and airspace opacities, some new, consistent with an infectious process (large airway and small airway disease). Follow-up CT chest in 3 months is recommended to ensure resolution.  CT chest 3/20/24: 1.  Indeterminate 1.9 cm left upper lobe nodule, corresponding to the nodule noted on the recent chest x-ray. Recommend PET/CT for further evaluation to exclude malignancy. Cannot exclude a vascular malformation without intravenous contrast. 2.   Right middle lobe focal atelectasis with consolidative appearance; acute versus chronic. 3.  Right upper lobe indeterminate area of focal consolidative lung markings. Cannot exclude pneumonia. 4.  Nonspecific consolidative change in the lingula. Cannot exclude pneumonia. 5.  Multiple areas of primarily peripheral bronchiectasis

## 2024-05-10 NOTE — SOCIAL HISTORY
[TextEntry] : Lung TB history: denies   COVID hx/vaccination: fully vaccinated   Occupation: employed as model/consultant  Patient lives with     Patient denies any major mental health history   Alcohol Consumption: social   Recreational Drug use: denies   Restrictions against blood products?: no

## 2024-05-10 NOTE — ASSESSMENT
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 She has undergone PET scan and EBUS with no other sites of disease. She is referred by Dr. Harris.   Diagnosis and staging were discussed with the patient. It measures 1.7 cm on CT scan and other staging work up is negative for joshua and distant metastases. Clinical stage is [id number], stage IA2. Resection is the gold standard treatment. Her FEV1 is 58% predicted and her DLCO is 32% predicted. VO2 max from cardiopulmonary exercise testing is 15.7 mL/kg/min. Based on the location, the patient will need a lingular segmentectomy. She should be able to tolerate this resection based on her VO2 max. Discussed this with the patient. Will also order a quantitative V/Q scan to better predict effect of surgery on postoperative lung function. Patient understood.   We will have her evaluated by cardiology preoperatively. Will obtain a CT scan with IV contrast for preoperative planning for segmentectomy. She has a contrast allergy and will be premedicated. Discussed left VATS robotic assisted lingulectomy and lymph node dissection for curative treatment of stage I neuroendocrine tumor. Discussed expected hospital stay and recovery. Discussed risks, benefits and alternatives. Patient understood and wishes to proceed with surgery. Tentative date is set for May 29, 2024.   PLAN 1.  Quantitative V/Q scan 2.  CT chest with IV contrast, segmentectomy protocol - prescan treatment for contrast allergy 3.  Cardiac evaluation 4.  Labs 5.  Bronchoscopy, left VATS robotic assisted lingular segmentectomy, mediastinal lymph node dissection on May 29, 2024.   I, Dr. Rader, personally performed the evaluation and management (E/M) services for this new patient. That E/M includes conducting the clinically appropriate initial history &/or exam, assessing all conditions, and establishing the plan of care. Today, my DEENA, Leora VasquezCineCoup, was here to observe my evaluation and management service for this patient & follow plan of care established by me going forward.

## 2024-05-10 NOTE — PHYSICAL EXAM
[Fully active, able to carry on all pre-disease performance without restriction] : Status 0 - Fully active, able to carry on all pre-disease performance without restriction [General Appearance - Alert] : alert [General Appearance - In No Acute Distress] : in no acute distress [Sclera] : the sclera and conjunctiva were normal [Outer Ear] : the ears and nose were normal in appearance [Neck Appearance] : the appearance of the neck was normal [Neck Cervical Mass (___cm)] : no neck mass was observed [Respiration, Rhythm And Depth] : normal respiratory rhythm and effort [Exaggerated Use Of Accessory Muscles For Inspiration] : no accessory muscle use [Heart Rate And Rhythm] : heart rate was normal and rhythm regular [Heart Sounds] : normal S1 and S2 [Examination Of The Chest] : the chest was normal in appearance [Chest Visual Inspection Thoracic Asymmetry] : no chest asymmetry [Bowel Sounds] : normal bowel sounds [Abdomen Soft] : soft [Abdomen Tenderness] : non-tender [Cervical Lymph Nodes Enlarged Posterior Bilaterally] : posterior cervical [Cervical Lymph Nodes Enlarged Anterior Bilaterally] : anterior cervical [Supraclavicular Lymph Nodes Enlarged Bilaterally] : supraclavicular [No CVA Tenderness] : no ~M costovertebral angle tenderness [No Spinal Tenderness] : no spinal tenderness [Abnormal Walk] : normal gait [Nail Clubbing] : no clubbing  or cyanosis of the fingernails [Musculoskeletal - Swelling] : no joint swelling seen [Skin Color & Pigmentation] : normal skin color and pigmentation [Skin Turgor] : normal skin turgor [] : no rash [Deep Tendon Reflexes (DTR)] : deep tendon reflexes were 2+ and symmetric [Sensation] : the sensory exam was normal to light touch and pinprick [No Focal Deficits] : no focal deficits [Oriented To Time, Place, And Person] : oriented to person, place, and time [Impaired Insight] : insight and judgment were intact [Affect] : the affect was normal

## 2024-05-13 ENCOUNTER — APPOINTMENT (OUTPATIENT)
Dept: HEART AND VASCULAR | Facility: CLINIC | Age: 74
End: 2024-05-13
Payer: COMMERCIAL

## 2024-05-13 ENCOUNTER — NON-APPOINTMENT (OUTPATIENT)
Age: 74
End: 2024-05-13

## 2024-05-13 VITALS
DIASTOLIC BLOOD PRESSURE: 70 MMHG | TEMPERATURE: 97.7 F | SYSTOLIC BLOOD PRESSURE: 132 MMHG | WEIGHT: 120.99 LBS | HEIGHT: 69 IN | BODY MASS INDEX: 17.92 KG/M2

## 2024-05-13 DIAGNOSIS — Z01.818 ENCOUNTER FOR OTHER PREPROCEDURAL EXAMINATION: ICD-10-CM

## 2024-05-13 PROCEDURE — 93000 ELECTROCARDIOGRAM COMPLETE: CPT | Mod: NC

## 2024-05-13 PROCEDURE — 99214 OFFICE O/P EST MOD 30 MIN: CPT | Mod: 25

## 2024-05-13 NOTE — ASSESSMENT
[Patient Optimized for Surgery] : Patient optimized for surgery [No Further Testing Recommended] : no further testing recommended [High Risk Surgery - Intraperitoneal, Intrathoracic or Supringuinal Vascular Procedures] : High Risk Surgery - Intraperitoneal, Intrathoracic or Supringuinal Vascular Procedures - Yes (1) [Ischemic Heart Disease] : Ischemic Heart Disease - No (0) [Congestive Heart Failure] : Congestive Heart Failure - No (0) [Prior Cerebrovascular Accident or TIA] : Prior Cerebrovascular Accident or TIA - No (0) [Creatinine >= 2mg/dL (1 Point)] : Creatinine >= 2mg/dL - No (0) [Insulin-dependent Diabetic (1 Point)] : Insulin-dependent Diabetic - No (0) [1] : 1 , RCRI Class: II, Risk of Post-Op Cardiac Complications: 6.0%, 95% CI for Risk Estimate: 4.9% - 7.4% [Modify anti-platelet treatment prior to procedure] : Modify anti-platelet treatment prior to procedure [FreeTextEntry6] : hold ASA 5 days prior to procedure

## 2024-05-13 NOTE — HISTORY OF PRESENT ILLNESS
[No Pertinent Cardiac History] : no history of aortic stenosis, atrial fibrillation, coronary artery disease, recent myocardial infarction, or implantable device/pacemaker [No Pertinent Pulmonary History] : no history of asthma, COPD, sleep apnea, or smoking [No Adverse Anesthesia Reaction] : no adverse anesthesia reaction in self or family member [Good (7-10 METs)] : Good (7-10 METs) [COPD] : COPD [FreeTextEntry1] : Bronchoscopy with Left VATS/RA lingular segmentectomy +mLND [FreeTextEntry2] : 5/29/24 [FreeTextEntry3] : Dr Garcia [FreeTextEntry4] : 74 yo F with HTN, HLD, PAD , high grade L ICA stenosis s/p L CEA with roof patch on 10/2019 and high-grade R ICA stenosis s/p R CEA on 4/24/2021.   Here today for pre-operative evaluation for Bronchoscopy with Left VATS/RA lingular segmentectomy + mLND.  bp well controlled, no dizziness. no exertional sxs. breathing has significantly improved with trelegy. able to walk up >2 flights of stairs without SOB or chest pressure.   ECHO 8/23: EF 63% without significant structural abnormalities EKG 5/13: NSR @ 71bpm

## 2024-05-15 PROBLEM — Z01.818 PRE-OP EVALUATION: Status: ACTIVE | Noted: 2019-10-21

## 2024-05-20 ENCOUNTER — APPOINTMENT (OUTPATIENT)
Dept: CT IMAGING | Facility: HOSPITAL | Age: 74
End: 2024-05-20

## 2024-05-20 ENCOUNTER — OUTPATIENT (OUTPATIENT)
Dept: OUTPATIENT SERVICES | Facility: HOSPITAL | Age: 74
LOS: 1 days | End: 2024-05-20
Payer: COMMERCIAL

## 2024-05-20 DIAGNOSIS — Z41.9 ENCOUNTER FOR PROCEDURE FOR PURPOSES OTHER THAN REMEDYING HEALTH STATE, UNSPECIFIED: Chronic | ICD-10-CM

## 2024-05-20 DIAGNOSIS — Z87.59 PERSONAL HISTORY OF OTHER COMPLICATIONS OF PREGNANCY, CHILDBIRTH AND THE PUERPERIUM: Chronic | ICD-10-CM

## 2024-05-20 DIAGNOSIS — Z90.89 ACQUIRED ABSENCE OF OTHER ORGANS: Chronic | ICD-10-CM

## 2024-05-20 LAB — POCT ISTAT CREATININE: 1.2 MG/DL — SIGNIFICANT CHANGE UP (ref 0.5–1.3)

## 2024-05-20 PROCEDURE — 71275 CT ANGIOGRAPHY CHEST: CPT

## 2024-05-20 PROCEDURE — 82565 ASSAY OF CREATININE: CPT

## 2024-05-20 PROCEDURE — 71275 CT ANGIOGRAPHY CHEST: CPT | Mod: 26

## 2024-05-21 ENCOUNTER — OUTPATIENT (OUTPATIENT)
Dept: OUTPATIENT SERVICES | Facility: HOSPITAL | Age: 74
LOS: 1 days | End: 2024-05-21

## 2024-05-21 ENCOUNTER — APPOINTMENT (OUTPATIENT)
Dept: NUCLEAR MEDICINE | Facility: HOSPITAL | Age: 74
End: 2024-05-21
Payer: COMMERCIAL

## 2024-05-21 DIAGNOSIS — Z87.59 PERSONAL HISTORY OF OTHER COMPLICATIONS OF PREGNANCY, CHILDBIRTH AND THE PUERPERIUM: Chronic | ICD-10-CM

## 2024-05-21 DIAGNOSIS — Z41.9 ENCOUNTER FOR PROCEDURE FOR PURPOSES OTHER THAN REMEDYING HEALTH STATE, UNSPECIFIED: Chronic | ICD-10-CM

## 2024-05-21 DIAGNOSIS — Z90.89 ACQUIRED ABSENCE OF OTHER ORGANS: Chronic | ICD-10-CM

## 2024-05-21 PROCEDURE — A9540: CPT

## 2024-05-21 PROCEDURE — 78598 LUNG PERF&VENTILAT DIFERENTL: CPT | Mod: 26

## 2024-05-21 PROCEDURE — A9567: CPT

## 2024-05-21 PROCEDURE — 78598 LUNG PERF&VENTILAT DIFERENTL: CPT

## 2024-05-21 NOTE — DISCHARGE NOTE PROVIDER - NSCORESITESY/N_GEN_A_CORE_RD
[facility]- Outpatient Rehabilitation and Therapy  [address], [place] [address] office: [phone] fax: [phone]     Physical Therapy Initial Evaluation Certification      Dear Yesi Mcrae, *,    We had the pleasure of evaluating the following patient for physical therapy services at [facility] Out[facility] Physical [facility].  A summary of our findings can be found in the initial assessment below.  This includes our plan of care.  If you have any questions or concerns regarding these findings, please do not hesitate to contact me at the office phone number listed above.  Thank you for the referral.     Physician Signature:_______________________________Date:__________________  By signing above (or electronic signature), therapist’s plan is approved by physician       Physical Therapy: TREATMENT/PROGRESS NOTE   Patient: Suly Rojas (37 y.o. female)   Examination Date: 2024   :  1986 MRN: [id number]   Visit #: 2   Insurance Allowable Auth Needed   ? []Yes    []No    Insurance: Payor: [employer] / Plan: [employer] [place] MEDICAID / Product Type: *No Product type* /   Insurance ID: [id number] - (Medicaid Managed)  Secondary Insurance (if applicable):    Treatment Diagnosis:   Cervical DDD M50.32   Medical Diagnosis:  Neck pain [M54.2]   Referring Physician: Yesi Mcrae, *  PCP: Yesi Mcrae, APRN - CNP     Plan of care signed (Y/N):     Date of Patient follow up with Physician:      Progress Report/POC: NO  POC update due: (10 visits /OR AUTH LIMITS, whichever is less)  2024                                             Precautions/ Contra-indications:           Latex allergy:  NO  Pacemaker:    NO  Contraindications for Manipulation: None  Date of Surgery: n/a  Other:    Red Flags:  None    C-SSRS Triggered by Intake questionnaire:   Patient answered 'NO' to both behavioral questions on intake.  No further screening  No

## 2024-05-24 NOTE — H&P ADULT - ASSESSMENT
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 She has undergone PET scan and EBUS with no other sites of disease. She is referred by Dr. Harris.  ?  Diagnosis and staging were discussed with the patient. It measures 1.7 cm on CT scan and other staging work up is negative for joshua and distant metastases. Clinical stage is [id number], stage IA2. Resection is the gold standard treatment. Her FEV1 is 58% predicted and her DLCO is 32% predicted. VO2 max from cardiopulmonary exercise testing is 15.7 mL/kg/min. Based on the location, the patient will need a lingular segmentectomy. She should be able to tolerate this resection based on her VO2 max. Discussed this with the patient. Will also order a quantitative V/Q scan to better predict effect of surgery on postoperative lung function. Patient understood.  ?  We will have her evaluated by cardiology preoperatively. Will obtain a CT scan with IV contrast for preoperative planning for segmentectomy. She has a contrast allergy and will be premedicated. Discussed left VATS robotic assisted lingulectomy and lymph node dissection for curative treatment of stage I neuroendocrine tumor. Discussed expected hospital stay and recovery. Discussed risks, benefits and alternatives. Patient understood and wishes to proceed with surgery.  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 She has undergone PET scan and EBUS with no other sites of disease. She is referred by Dr. Harris.  ?  Diagnosis and staging were discussed with the patient. It measures 1.7 cm on CT scan and other staging work up is negative for joshua and distant metastases. Clinical stage is [id number], stage IA2. Resection is the gold standard treatment. Her FEV1 is 58% predicted and her DLCO is 32% predicted. VO2 max from cardiopulmonary exercise testing is 15.7 mL/kg/min. Based on the location, the patient will need a lingular segmentectomy. She should be able to tolerate this resection based on her VO2 max. Discussed this with the patient. Will also order a quantitative V/Q scan to better predict effect of surgery on postoperative lung function. Patient understood.  ?  We will have her evaluated by cardiology preoperatively. Will obtain a CT scan with IV contrast for preoperative planning for segmentectomy. She has a contrast allergy and will be premedicated. Discussed left VATS robotic assisted lingulectomy and lymph node dissection for curative treatment of stage I neuroendocrine tumor. Discussed expected hospital stay and recovery. Discussed risks, benefits and alternatives. Patient understood and wishes to proceed with surgery.     Admit under Dr. Rader via same day surgery. Consent signed, placed on chart.  Risks/benefits reviewed, patient understands and agrees. T&S ordered and blood products placed on hold for OR.  To 9 L post-op.

## 2024-05-24 NOTE — H&P ADULT - NSICDXFAMILYHX_GEN_ALL_CORE_FT
FAMILY HISTORY:  Mother  Still living? Unknown  FH: osteoporosis, Age at diagnosis: Age Unknown  FH: pancreatic cancer, Age at diagnosis: Age Unknown

## 2024-05-24 NOTE — H&P ADULT - NSICDXPASTMEDICALHX_GEN_ALL_CORE_FT
PAST MEDICAL HISTORY:  Asthma     Carotid artery stenosis     HLD (hyperlipidemia)     HTN (hypertension)     Osteoporosis     [id number]-Jara-Lbvkkpt syndrome     UTI (urinary tract infection)

## 2024-05-24 NOTE — H&P ADULT - NSHPPHYSICALEXAM_GEN_ALL_CORE
weight = 120 lbs    ECOG Performance Status: Status 0 - Fully active, able to carry on all pre-disease performance without restriction.     Constitutional: alert and in no acute distress.  Eyes: the sclera and conjunctiva were normal.  ENT: the ears and nose were normal in appearance.  Neck: the appearance of the neck was normal and no neck mass was observed.  Pulmonary: no respiratory distress, normal respiratory rhythm and effort and no accessory muscle use.  Heart: heart rate was normal and rhythm regular and normal S1 and S2.  Chest: the chest was normal in appearance and no chest asymmetry.  Abdomen: normal bowel sounds, soft and non-tender.  Lymphatics: The posterior cervical, anterior cervical and supraclavicular nodes were non-tender and normal size.  Back: no costovertebral angle tenderness and no spinal tenderness.  Musculoskeletal: normal gait, no clubbing or cyanosis of the fingernails and no joint swelling seen.  Skin: normal skin color and pigmentation, normal skin turgor and no rash.  Neurological: deep tendon reflexes were 2+ and symmetric, the sensory exam was normal to light touch and pinprick and no focal deficits.  Psychiatric: oriented to person, place, and time, insight and judgment were intact and the affect was normal.

## 2024-05-24 NOTE — H&P ADULT - HISTORY OF PRESENT ILLNESS
Patient is a 74 yo female, never smoker, with a PMHx of HTN, HLD, PAD, high grade L ICA stenosis s/p L CEA with roof patch on 10/2019 and high-grade R ICA stenosis s/p R CEA on 4/24/2021. Additionally, she has a hx of cancer melanoma 2x: 10+years ago.  ?  She is referred by Dr. Kaelyn Harris for surgical evaluation of a neuroendocrine tumor, confirmed through robotic assisted bronchoscopy, ebus, tbna on 04/19 with Dr. Garcia .  ?  ? Patient is a 74 yo female, never smoker, with a PMHx of HTN, HLD, PAD, high grade L ICA stenosis s/p L CEA with roof patch on 10/2019 and high-grade R ICA stenosis s/p R CEA on 4/24/2021. Additionally, she has a hx of cancer melanoma 2x: 10+years ago.  ?  She is referred by Dr. Kaelyn Harris for surgical evaluation of a neuroendocrine tumor, confirmed through robotic assisted bronchoscopy, ebus, tbna on 04/19 with Dr. Garcia .    Patient seen in same day holding area; Reports no changes to PMHx or medications since last seen by our team. Denies acute or current SOB, chest pain, palpitation, N/V/D, fever/chills, recent illness, or any other concerning symptoms.   ?  ?

## 2024-05-24 NOTE — H&P ADULT - NSHPLABSRESULTS_GEN_ALL_CORE
Pathology 4/19/24:  Lung, lingula, biopsy:- Neuroendocrine tumor, favor grade 2  LYMPH NODE, 11L, FNA: NEGATIVE FOR MALIGNANT CELLS  LYMPH NODE, 4L, FNA: NEGATIVE FOR MALIGNANT CELL  LYMPH NODE, 7, EBUS-GUIDED FNA: NEGATIVE FOR MALIGNANT CELLS.  LYMPH NODE, 11RS, FNA: NEGATIVE FOR MALIGNANT CELLS  LUNG, LINGULA, BAL: NEGATIVE FOR MALIGNANT CELLS  LUNG, LINGULA, FNA: POSITIVE FOR MALIGNANT CELLS  ?  PFT done on 4/10/24 showed FEV1 = 1.41, 56% of predicted value, FVC =2.38, 71% of predicted value, PEF =3.24, 54% of predicted value and DLCO =7.44, 32% of predicted value.  ?  ?  PET/CT 4/3/24:  1. Hypermetabolic 16 mm nodule at the posterior left upper lobe, SUV max 7.4, suspicious for malignancy. Biopsy may be of benefit. Otherwise, close follow-up is recommended to evaluate for persistence.  2. Scattered FDG avid nodular and airspace opacities, some new, consistent with an infectious process (large airway and small airway disease). Follow-up CT chest in 3 months is recommended to ensure resolution.  ?  CT chest 3/20/24:  1. Indeterminate 1.9 cm left upper lobe nodule, corresponding to the nodule noted on the recent chest x-ray. Recommend PET/CT for further evaluation to exclude malignancy. Cannot exclude a vascular malformation without intravenous contrast.  2. Right middle lobe focal atelectasis with consolidative appearance; acute versus chronic.  3. Right upper lobe indeterminate area of focal consolidative lung markings. Cannot exclude pneumonia.  4. Nonspecific consolidative change in the lingula. Cannot exclude pneumonia.  5. Multiple areas of primarily peripheral bronchiectasis  ?

## 2024-05-28 ENCOUNTER — TRANSCRIPTION ENCOUNTER (OUTPATIENT)
Age: 74
End: 2024-05-28

## 2024-05-28 ENCOUNTER — NON-APPOINTMENT (OUTPATIENT)
Age: 74
End: 2024-05-28

## 2024-05-28 VITALS
WEIGHT: 125.44 LBS | HEART RATE: 74 BPM | HEIGHT: 69 IN | DIASTOLIC BLOOD PRESSURE: 79 MMHG | SYSTOLIC BLOOD PRESSURE: 128 MMHG | OXYGEN SATURATION: 96 % | RESPIRATION RATE: 16 BRPM | TEMPERATURE: 98 F

## 2024-05-28 RX ORDER — BIMATOPROST 0.3 MG/ML
1 SOLUTION/ DROPS OPHTHALMIC
Qty: 0 | Refills: 0 | DISCHARGE

## 2024-05-28 RX ORDER — DESLORATADINE 5 MG/1
1 TABLET, FILM COATED ORAL
Qty: 0 | Refills: 0 | DISCHARGE

## 2024-05-28 RX ORDER — CILOSTAZOL 100 MG/1
1 TABLET ORAL
Qty: 0 | Refills: 0 | DISCHARGE

## 2024-05-28 NOTE — PATIENT PROFILE ADULT - FALL HARM RISK - HARM RISK INTERVENTIONS

## 2024-05-28 NOTE — PRE-OP CHECKLIST - BSA (M2)
Daily Note     Today's date: 2018  Patient name: Narciso Knowles  : 1963  MRN: [id number]  Referring provider: Amparo Ovalle MD  Dx:   Encounter Diagnosis     ICD-10-CM    1  Chronic neck pain M54 2     G89 29      1 on 1 with PT for entire session             Subjective: Patient stated significant stiffness in her C/S prior to treatment session        Objective: See treatment diary below  Precautions: N/A      Specialty Daily Treatment Diary      Manual      [facility]   Towel Cervical Distraction     [facility]   Cervical MET            manual isometrics            OA mob [facility]   Cervical ROM         First rib mob                        Exercise Diary      Chin Tucks on FR NP  NP       Chin Tucks on FR with Rot NP  NP       3 Finger Rot  5" x 15  Seated on pball  5" x 15 seated on pball  5" x 15 seated on pball 5" x 15  Seated on pball 5" x 15  Seated on pball 5" x 15   MWM Ext NP         MWM Rot NP         FR Protocol  NP          TB Rows GTB  5" x 15  W/ deep neck flexion  GTB  5" x15  W/ deep neck flexion GTB  5" x 15  Watch forward neck GTB  5" x 15   GTB  5"x 15 GTB 5" x 15    TB low rows GTB  5" x 15  W/ deep neck flexion  GTB  5" x 15  W/ deep neck flexion GTB  5" x 15  Watch forward neck GTB  5" x 15 GTB  5" x 15 GTB 5"x 15     TB W's NP          deep neck flexor iso  5" x 15  5" x 15 5" x 15 5" x 15 5" x 15 5" x 15    deep neck flexor with UT elevation  5" x 15  5" x 15 Wall slide Wall slide  5" x 15 Wall slide  5" x 15 Wall slide    5" x 15    Deep neck flexion seated on Pball  5" x 15  5" x 15 5" x 15 5" x 15 5" x 15 5" x 15    TB ER  bilateral  OTB  5" x 15  W/ chin tuck   bilateral  OTB  5" x 15  W/ chin tuck Bilateral  GTB  5" x 15  W/ chin tuck Bilateral  GTB  5" x 15  W/ chin tuck Bilateral  GTB  5" x 15  W/ chin tuck Bilateral GTB 5" x 15 w/chin tuck    scap punches w/ chin tuck    5#  2 x 10 5#  2 x 10 5#  2 x 10 5#  2 x 15 5# 2x15    Butterflies     5" x 15 5" x 15 5" x 15 5" x 15   Thoracic extension     5" x 15 5" x 15 5" x 15 5" x 15    Towel SNAGs for bilat  Rot         10 ea                                              Modalities 6/28 7/2 7/5 7/9 7/12 7/26   MH 10'   10'  10 mins  10'  10' 10'                                           Assessment: Patient demonstrated improved bilateral C/S rotation after completion of towel SNAGs; positive response to manual intervention; patient instructed to attempt towel SNAGs at home to improve C/S rotation  Plan: Continue per plan of care  1.69

## 2024-05-29 ENCOUNTER — INPATIENT (INPATIENT)
Facility: HOSPITAL | Age: 74
LOS: 7 days | Discharge: HOME | End: 2024-06-06
Attending: THORACIC SURGERY (CARDIOTHORACIC VASCULAR SURGERY) | Admitting: THORACIC SURGERY (CARDIOTHORACIC VASCULAR SURGERY)
Payer: COMMERCIAL

## 2024-05-29 ENCOUNTER — APPOINTMENT (OUTPATIENT)
Dept: THORACIC SURGERY | Facility: HOSPITAL | Age: 74
End: 2024-05-29

## 2024-05-29 ENCOUNTER — RESULT REVIEW (OUTPATIENT)
Age: 74
End: 2024-05-29

## 2024-05-29 DIAGNOSIS — Z87.59 PERSONAL HISTORY OF OTHER COMPLICATIONS OF PREGNANCY, CHILDBIRTH AND THE PUERPERIUM: Chronic | ICD-10-CM

## 2024-05-29 DIAGNOSIS — Z41.9 ENCOUNTER FOR PROCEDURE FOR PURPOSES OTHER THAN REMEDYING HEALTH STATE, UNSPECIFIED: Chronic | ICD-10-CM

## 2024-05-29 DIAGNOSIS — Z90.89 ACQUIRED ABSENCE OF OTHER ORGANS: Chronic | ICD-10-CM

## 2024-05-29 LAB
A1C WITH ESTIMATED AVERAGE GLUCOSE RESULT: 6.1 % — HIGH (ref 4–5.6)
ADD ON TEST-SPECIMEN IN LAB: SIGNIFICANT CHANGE UP
ANION GAP SERPL CALC-SCNC: 9 MMOL/L — SIGNIFICANT CHANGE UP (ref 5–17)
BASOPHILS # BLD AUTO: 0.03 K/UL — SIGNIFICANT CHANGE UP (ref 0–0.2)
BASOPHILS NFR BLD AUTO: 0.3 % — SIGNIFICANT CHANGE UP (ref 0–2)
BLD GP AB SCN SERPL QL: NEGATIVE — SIGNIFICANT CHANGE UP
BUN SERPL-MCNC: 28 MG/DL — HIGH (ref 7–23)
CALCIUM SERPL-MCNC: 9 MG/DL — SIGNIFICANT CHANGE UP (ref 8.4–10.5)
CHLORIDE SERPL-SCNC: 101 MMOL/L — SIGNIFICANT CHANGE UP (ref 96–108)
CO2 SERPL-SCNC: 21 MMOL/L — LOW (ref 22–31)
CREAT SERPL-MCNC: 1.02 MG/DL — SIGNIFICANT CHANGE UP (ref 0.5–1.3)
EGFR: 58 ML/MIN/1.73M2 — LOW
EOSINOPHIL # BLD AUTO: 0.04 K/UL — SIGNIFICANT CHANGE UP (ref 0–0.5)
EOSINOPHIL NFR BLD AUTO: 0.4 % — SIGNIFICANT CHANGE UP (ref 0–6)
ESTIMATED AVERAGE GLUCOSE: 128 MG/DL — HIGH (ref 68–114)
GLUCOSE SERPL-MCNC: 169 MG/DL — HIGH (ref 70–99)
HCT VFR BLD CALC: 33.6 % — LOW (ref 34.5–45)
HGB BLD-MCNC: 11.3 G/DL — LOW (ref 11.5–15.5)
IMM GRANULOCYTES NFR BLD AUTO: 0.7 % — SIGNIFICANT CHANGE UP (ref 0–0.9)
LYMPHOCYTES # BLD AUTO: 0.65 K/UL — LOW (ref 1–3.3)
LYMPHOCYTES # BLD AUTO: 6.1 % — LOW (ref 13–44)
MCHC RBC-ENTMCNC: 32.4 PG — SIGNIFICANT CHANGE UP (ref 27–34)
MCHC RBC-ENTMCNC: 33.6 GM/DL — SIGNIFICANT CHANGE UP (ref 32–36)
MCV RBC AUTO: 96.3 FL — SIGNIFICANT CHANGE UP (ref 80–100)
MONOCYTES # BLD AUTO: 0.26 K/UL — SIGNIFICANT CHANGE UP (ref 0–0.9)
MONOCYTES NFR BLD AUTO: 2.4 % — SIGNIFICANT CHANGE UP (ref 2–14)
NEUTROPHILS # BLD AUTO: 9.68 K/UL — HIGH (ref 1.8–7.4)
NEUTROPHILS NFR BLD AUTO: 90.1 % — HIGH (ref 43–77)
NRBC # BLD: 0 /100 WBCS — SIGNIFICANT CHANGE UP (ref 0–0)
PLATELET # BLD AUTO: 215 K/UL — SIGNIFICANT CHANGE UP (ref 150–400)
POTASSIUM SERPL-MCNC: 5.1 MMOL/L — SIGNIFICANT CHANGE UP (ref 3.5–5.3)
POTASSIUM SERPL-SCNC: 5.1 MMOL/L — SIGNIFICANT CHANGE UP (ref 3.5–5.3)
RBC # BLD: 3.49 M/UL — LOW (ref 3.8–5.2)
RBC # FLD: 14.4 % — SIGNIFICANT CHANGE UP (ref 10.3–14.5)
RH IG SCN BLD-IMP: POSITIVE — SIGNIFICANT CHANGE UP
SODIUM SERPL-SCNC: 131 MMOL/L — LOW (ref 135–145)
TSH SERPL-MCNC: 3.82 UIU/ML — SIGNIFICANT CHANGE UP (ref 0.27–4.2)
WBC # BLD: 10.74 K/UL — HIGH (ref 3.8–10.5)
WBC # FLD AUTO: 10.74 K/UL — HIGH (ref 3.8–10.5)

## 2024-05-29 PROCEDURE — 32674 THORACOSCOPY LYMPH NODE EXC: CPT

## 2024-05-29 PROCEDURE — 92287 ANT SGM IMG IR FLRSCN ANGRPH: CPT | Mod: 26

## 2024-05-29 PROCEDURE — 99222 1ST HOSP IP/OBS MODERATE 55: CPT | Mod: GC

## 2024-05-29 PROCEDURE — 88341 IMHCHEM/IMCYTCHM EA ADD ANTB: CPT | Mod: 26

## 2024-05-29 PROCEDURE — S2900 ROBOTIC SURGICAL SYSTEM: CPT | Mod: NC,80

## 2024-05-29 PROCEDURE — 88342 IMHCHEM/IMCYTCHM 1ST ANTB: CPT | Mod: 26

## 2024-05-29 PROCEDURE — 88305 TISSUE EXAM BY PATHOLOGIST: CPT | Mod: 26

## 2024-05-29 PROCEDURE — 32669 THORACOSCOPY REMOVE SEGMENT: CPT | Mod: LT

## 2024-05-29 PROCEDURE — 88309 TISSUE EXAM BY PATHOLOGIST: CPT | Mod: 26

## 2024-05-29 PROCEDURE — S2900 ROBOTIC SURGICAL SYSTEM: CPT | Mod: NC

## 2024-05-29 PROCEDURE — 71045 X-RAY EXAM CHEST 1 VIEW: CPT | Mod: 26

## 2024-05-29 DEVICE — XI STAPLER ENDOWRIST RELOAD 30MM WHITE: Type: IMPLANTABLE DEVICE | Site: LEFT | Status: FUNCTIONAL

## 2024-05-29 DEVICE — XI STAPLER SUREFORM RELOAD 45 BLACK: Type: IMPLANTABLE DEVICE | Site: LEFT | Status: FUNCTIONAL

## 2024-05-29 DEVICE — STAPLER COVIDIEN TRI-STAPLE CURVED 30MM TAN RELOAD: Type: IMPLANTABLE DEVICE | Site: LEFT | Status: FUNCTIONAL

## 2024-05-29 DEVICE — XI STAPLER ENDOWRIST RELOAD 30MM BLUE: Type: IMPLANTABLE DEVICE | Site: LEFT | Status: FUNCTIONAL

## 2024-05-29 DEVICE — XI STAPLER SUREFORM RELOAD 45 WHITE: Type: IMPLANTABLE DEVICE | Site: LEFT | Status: FUNCTIONAL

## 2024-05-29 DEVICE — CHEST DRAIN THORACIC PVC 24FR STRAIGHT: Type: IMPLANTABLE DEVICE | Site: LEFT | Status: FUNCTIONAL

## 2024-05-29 DEVICE — SURGCEL 4 X 8": Type: IMPLANTABLE DEVICE | Site: LEFT | Status: FUNCTIONAL

## 2024-05-29 RX ORDER — SENNA PLUS 8.6 MG/1
2 TABLET ORAL AT BEDTIME
Refills: 0 | Status: DISCONTINUED | OUTPATIENT
Start: 2024-05-29 | End: 2024-06-06

## 2024-05-29 RX ORDER — SODIUM CHLORIDE 9 MG/ML
1000 INJECTION, SOLUTION INTRAVENOUS
Refills: 0 | Status: DISCONTINUED | OUTPATIENT
Start: 2024-05-29 | End: 2024-06-06

## 2024-05-29 RX ORDER — NETARSUDIL 0.2 MG/ML
1 SOLUTION/ DROPS OPHTHALMIC; TOPICAL
Refills: 0 | DISCHARGE

## 2024-05-29 RX ORDER — OXYCODONE HYDROCHLORIDE 5 MG/1
5 TABLET ORAL EVERY 6 HOURS
Refills: 0 | Status: DISCONTINUED | OUTPATIENT
Start: 2024-05-29 | End: 2024-05-29

## 2024-05-29 RX ORDER — TAFLUPROST 0 MG/.3ML
1 SOLUTION/ DROPS OPHTHALMIC
Refills: 0 | DISCHARGE

## 2024-05-29 RX ORDER — PANTOPRAZOLE SODIUM 20 MG/1
40 TABLET, DELAYED RELEASE ORAL
Refills: 0 | Status: DISCONTINUED | OUTPATIENT
Start: 2024-05-29 | End: 2024-06-06

## 2024-05-29 RX ORDER — CEFAZOLIN SODIUM 1 G
2000 VIAL (EA) INJECTION EVERY 8 HOURS
Refills: 0 | Status: COMPLETED | OUTPATIENT
Start: 2024-05-29 | End: 2024-05-30

## 2024-05-29 RX ORDER — ACETAMINOPHEN 500 MG
975 TABLET ORAL EVERY 6 HOURS
Refills: 0 | Status: DISCONTINUED | OUTPATIENT
Start: 2024-05-29 | End: 2024-05-29

## 2024-05-29 RX ORDER — ENOXAPARIN SODIUM 100 MG/ML
40 INJECTION SUBCUTANEOUS EVERY 24 HOURS
Refills: 0 | Status: DISCONTINUED | OUTPATIENT
Start: 2024-05-30 | End: 2024-06-06

## 2024-05-29 RX ORDER — HEPARIN SODIUM 5000 [USP'U]/ML
5000 INJECTION INTRAVENOUS; SUBCUTANEOUS ONCE
Refills: 0 | Status: COMPLETED | OUTPATIENT
Start: 2024-05-29 | End: 2024-05-29

## 2024-05-29 RX ORDER — ACETAMINOPHEN 500 MG
975 TABLET ORAL ONCE
Refills: 0 | Status: COMPLETED | OUTPATIENT
Start: 2024-05-29 | End: 2024-05-29

## 2024-05-29 RX ORDER — ACETAMINOPHEN 500 MG
1000 TABLET ORAL ONCE
Refills: 0 | Status: COMPLETED | OUTPATIENT
Start: 2024-05-29 | End: 2024-05-29

## 2024-05-29 RX ORDER — TRAZODONE HCL 50 MG
0 TABLET ORAL
Refills: 0 | DISCHARGE

## 2024-05-29 RX ORDER — VALSARTAN 80 MG/1
120 TABLET ORAL DAILY
Refills: 0 | Status: DISCONTINUED | OUTPATIENT
Start: 2024-05-29 | End: 2024-05-30

## 2024-05-29 RX ORDER — HYDROMORPHONE HYDROCHLORIDE 2 MG/ML
0.2 INJECTION INTRAMUSCULAR; INTRAVENOUS; SUBCUTANEOUS
Refills: 0 | Status: DISCONTINUED | OUTPATIENT
Start: 2024-05-29 | End: 2024-06-01

## 2024-05-29 RX ORDER — OXYCODONE HYDROCHLORIDE 5 MG/1
5 TABLET ORAL ONCE
Refills: 0 | Status: DISCONTINUED | OUTPATIENT
Start: 2024-05-29 | End: 2024-05-29

## 2024-05-29 RX ORDER — ATORVASTATIN CALCIUM 80 MG/1
40 TABLET, FILM COATED ORAL AT BEDTIME
Refills: 0 | Status: DISCONTINUED | OUTPATIENT
Start: 2024-05-29 | End: 2024-06-06

## 2024-05-29 RX ORDER — AZELASTINE 137 UG/1
2 SPRAY, METERED NASAL
Refills: 0 | DISCHARGE

## 2024-05-29 RX ORDER — LIDOCAINE 4 G/100G
1 CREAM TOPICAL DAILY
Refills: 0 | Status: DISCONTINUED | OUTPATIENT
Start: 2024-05-29 | End: 2024-06-06

## 2024-05-29 RX ORDER — ATORVASTATIN CALCIUM 80 MG/1
1 TABLET, FILM COATED ORAL
Qty: 0 | Refills: 0 | DISCHARGE

## 2024-05-29 RX ORDER — LATANOPROST 0.05 MG/ML
1 SOLUTION/ DROPS OPHTHALMIC; TOPICAL AT BEDTIME
Refills: 0 | Status: DISCONTINUED | OUTPATIENT
Start: 2024-05-29 | End: 2024-06-02

## 2024-05-29 RX ORDER — ACETAMINOPHEN 500 MG
975 TABLET ORAL EVERY 6 HOURS
Refills: 0 | Status: DISCONTINUED | OUTPATIENT
Start: 2024-05-29 | End: 2024-05-30

## 2024-05-29 RX ORDER — FLUTICASONE FUROATE, UMECLIDINIUM BROMIDE AND VILANTEROL TRIFENATATE 200; 62.5; 25 UG/1; UG/1; UG/1
1 POWDER RESPIRATORY (INHALATION)
Refills: 0 | DISCHARGE

## 2024-05-29 RX ORDER — OXYCODONE HYDROCHLORIDE 5 MG/1
10 TABLET ORAL EVERY 6 HOURS
Refills: 0 | Status: DISCONTINUED | OUTPATIENT
Start: 2024-05-29 | End: 2024-06-01

## 2024-05-29 RX ADMIN — ATORVASTATIN CALCIUM 40 MILLIGRAM(S): 80 TABLET, FILM COATED ORAL at 21:02

## 2024-05-29 RX ADMIN — OXYCODONE HYDROCHLORIDE 5 MILLIGRAM(S): 5 TABLET ORAL at 22:30

## 2024-05-29 RX ADMIN — HYDROMORPHONE HYDROCHLORIDE 0.2 MILLIGRAM(S): 2 INJECTION INTRAMUSCULAR; INTRAVENOUS; SUBCUTANEOUS at 14:15

## 2024-05-29 RX ADMIN — LATANOPROST 1 DROP(S): 0.05 SOLUTION/ DROPS OPHTHALMIC; TOPICAL at 22:22

## 2024-05-29 RX ADMIN — Medication 975 MILLIGRAM(S): at 06:46

## 2024-05-29 RX ADMIN — Medication 5 MILLIGRAM(S): at 21:03

## 2024-05-29 RX ADMIN — OXYCODONE HYDROCHLORIDE 5 MILLIGRAM(S): 5 TABLET ORAL at 21:02

## 2024-05-29 RX ADMIN — LIDOCAINE 1 PATCH: 4 CREAM TOPICAL at 13:59

## 2024-05-29 RX ADMIN — OXYCODONE HYDROCHLORIDE 5 MILLIGRAM(S): 5 TABLET ORAL at 19:00

## 2024-05-29 RX ADMIN — Medication 400 MILLIGRAM(S): at 14:15

## 2024-05-29 RX ADMIN — HYDROMORPHONE HYDROCHLORIDE 0.2 MILLIGRAM(S): 2 INJECTION INTRAMUSCULAR; INTRAVENOUS; SUBCUTANEOUS at 19:12

## 2024-05-29 RX ADMIN — SODIUM CHLORIDE 50 MILLILITER(S): 9 INJECTION, SOLUTION INTRAVENOUS at 12:11

## 2024-05-29 RX ADMIN — HEPARIN SODIUM 5000 UNIT(S): 5000 INJECTION INTRAVENOUS; SUBCUTANEOUS at 06:51

## 2024-05-29 RX ADMIN — VALSARTAN 120 MILLIGRAM(S): 80 TABLET ORAL at 19:49

## 2024-05-29 RX ADMIN — Medication 1000 MILLIGRAM(S): at 16:37

## 2024-05-29 RX ADMIN — Medication 100 MILLIGRAM(S): at 16:21

## 2024-05-29 RX ADMIN — OXYCODONE HYDROCHLORIDE 5 MILLIGRAM(S): 5 TABLET ORAL at 19:12

## 2024-05-29 RX ADMIN — LIDOCAINE 1 PATCH: 4 CREAM TOPICAL at 19:11

## 2024-05-29 NOTE — PROGRESS NOTE ADULT - ASSESSMENT
72 y/o F, never smoker, with a PMHx of HTN, HLD, PAD, high grade L ICA stenosis s/p L CEA with roof patch on 10/2019 and high-grade R ICA stenosis s/p R CEA on 4/24/2021. Additionally, she has a hx of cancer melanoma 2x: 10+years ago. Pt referred by Dr. Kaelyn Harris for surgical evaluation of a neuroendocrine tumor, confirmed through robotic assisted bronchoscopy, ebus, tbna on 04/19 with Dr. Garcia. Deemed a good surgical candidate and on 5/29/24 pt underwent L VATS RA Lingulectomy MLND. OR course uncomplicated. Arrived to PACU with 1 CT in place, on suction, no air leak. Recovering well and pain well controlled.    Plan:    Neurovascular:   -Pain well controlled with current regimen. PRN's:    Cardiovascular:   -Hemodynamically stable.   -Monitor: BP, HR, tele    Respiratory:   -Oxygenating well on room air  -Encourage continued use of IS 10x/hr and frequent ambulation  -CXR:    GI:  -GI PPX:  -PO Diet  -Bowel Regimen:     Renal / :  -Continue to monitor renal function: BUN/Cr  -Monitor I/O's daily     Endocrine:    -No hx of DM or thyroid dx  -A1c:  -TSH:    Hematologic:  -CBC: H/H-  -Coagulation Panel.    ID:  -Temperature:   -CBC: WBC-  -Continue to observe for SIRS/Sepsis Syndrome.    Prophylaxis:  -DVT prophylaxis with 5000 SubQ Heparin q8h.  -Continue with SCD's b/l while patient is at rest     Disposition:  -Discharge home once patient is medically ready     72 y/o F, never smoker, with a PMHx of HTN, HLD, PAD, high grade L ICA stenosis s/p L CEA with roof patch on 10/2019 and high-grade R ICA stenosis s/p R CEA on 4/24/2021. Additionally, she has a hx of cancer melanoma 2x: 10+years ago. Pt referred by Dr. Kaelyn Harris for surgical evaluation of a neuroendocrine tumor, confirmed through robotic assisted bronchoscopy, ebus, tbna on 04/19 with Dr. Garcia. Deemed a good surgical candidate and on 5/29/24 pt underwent L VATS RA Lingulectomy MLND. OR course uncomplicated. Arrived to PACU with 1 CT in place, on suction, no air leak. Recovering well and pain well controlled.    Plan:  Neurovascular:   -Pain well controlled with current regimen. PRN's: acetaminophen, dilaudid, oxy 5-10   -lidocaine patch     HEENT:  -continue home latanoprost     Cardiovascular:   -HLD: statin 40mg daily   -Hemodynamically stable.   -Monitor: BP, HR, tele    Respiratory:   -POD0 L VATS RA Lingulectomy MLND     -CT in place, on suction, no air leak   -Oxygenating well on room air  -Encourage continued use of IS 10x/hr and frequent ambulation  -CXR: no acute pathology     GI:  -GI PPX: pantoprazole 40mg daily   -PO Diet: clear liquid   -Bowel Regimen: senna     Renal / :  -Continue to monitor renal function: BUN/Cr 28/1.02   -Monitor I/O's daily     Endocrine:    -No hx of DM or thyroid dx  -A1c: pending   -TSH: pending     Hematologic:  -CBC: H/H- 11/33  -Coagulation Panel: WNL     ID:  -Temperature: afebrile   -CBC: WBC- 10.7   -Continue to observe for SIRS/Sepsis Syndrome.    Prophylaxis:  -DVT prophylaxis with lovenox 40mg Q24 hours   -Continue with SCD's b/l while patient is at rest     Disposition:  -chest tube management

## 2024-05-29 NOTE — BRIEF OPERATIVE NOTE - COMMENTS
I first assisted for the entirety of the case, including but not limited to opening, port placement/docking, robotic instrumentation, chest tube placement, and closure.

## 2024-05-29 NOTE — PROGRESS NOTE ADULT - SUBJECTIVE AND OBJECTIVE BOX
Patient discussed on morning rounds with Dr. Rader     OPERATION & DATE: 5/29/24 -- L VATS RA Lingulectomy MLND     SUBJECTIVE ASSESSMENT: Pt is feeling well in the PACU, pain is well controlled. Denies any chest pain, palpitations, orthopnea, dyspnea on exertion, shortness of breath, wheezing, abd pain, nausea, vomiting, constipation, lightheadedness, headaches, fevers, or chills.    VITAL SIGNS:  Vital Signs Last 24 Hrs  T(C): 36.3 (29 May 2024 14:50), Max: 36.8 (29 May 2024 07:00)  T(F): 97.3 (29 May 2024 14:50), Max: 97.3 (29 May 2024 11:57)  HR: 58 (29 May 2024 14:37) (57 - 75)  BP: 115/58 (29 May 2024 14:37) (109/55 - 158/82)  BP(mean): 83 (29 May 2024 14:37) (78 - 112)  RR: 14 (29 May 2024 14:37) (11 - 20)  SpO2: 98% (29 May 2024 14:37) (96% - 100%)    Parameters below as of 29 May 2024 13:37  Patient On (Oxygen Delivery Method): nasal cannula  O2 Flow (L/min): 2    I&O's Detail    29 May 2024 07:01  -  29 May 2024 15:20  --------------------------------------------------------  IN:    Lactated Ringers: 100 mL    Oral Fluid: 100 mL  Total IN: 200 mL    OUT:    Chest Tube (mL): 90 mL  Total OUT: 90 mL    Total NET: 110 mL    CHEST TUBE:  yes   LOREN DRAIN:  no  EPICARDIAL WIRES: no  STITCHES: no  STAPLES: no  WEAVER:  no  CENTRAL LINE: no  MIDLINE/PICC: no  WOUND VAC: no    PHYSICAL EXAM:  General: well appearing resting in bed in NAD   HEENT: normocephalic, atraumatic   Cardio: normal s1/s2  Pulm: lungs cta b/l  GI: +BS 4 quadrants, soft  Extremities: no edema, no calf tenderness  Vascular: dp 2+ b/l, radial 2+ b/l   Incisions: left VATS incisions without erythema, purulence or ecchymosis.     LABS:                        11.3   10.74 )-----------( 215      ( 29 May 2024 12:10 )             33.6     05-29    131<L>  |  101  |  28<H>  ----------------------------<  169<H>  5.1   |  21<L>  |  1.02    Ca    9.0      29 May 2024 12:10      Urinalysis Basic - ( 29 May 2024 12:10 )    Color: x / Appearance: x / SG: x / pH: x  Gluc: 169 mg/dL / Ketone: x  / Bili: x / Urobili: x   Blood: x / Protein: x / Nitrite: x   Leuk Esterase: x / RBC: x / WBC x   Sq Epi: x / Non Sq Epi: x / Bacteria: x      MEDICATIONS  (STANDING):  atorvastatin 40 milliGRAM(s) Oral at bedtime  bisacodyl 5 milliGRAM(s) Oral at bedtime  ceFAZolin   IVPB 2000 milliGRAM(s) IV Intermittent every 8 hours  lactated ringers. 1000 milliLiter(s) (50 mL/Hr) IV Continuous <Continuous>  latanoprost 0.005% Ophthalmic Solution 1 Drop(s) Both EYES at bedtime  lidocaine   4% Patch 1 Patch Transdermal daily  pantoprazole    Tablet 40 milliGRAM(s) Oral before breakfast  senna 2 Tablet(s) Oral at bedtime    MEDICATIONS  (PRN):  acetaminophen     Tablet .. 975 milliGRAM(s) Oral every 6 hours PRN Mild Pain (1 - 3)  HYDROmorphone  Injectable 0.2 milliGRAM(s) IV Push every 3 hours PRN breakthrough pain  oxyCODONE    IR 5 milliGRAM(s) Oral every 6 hours PRN Moderate Pain (4 - 6)  oxyCODONE    IR 10 milliGRAM(s) Oral every 6 hours PRN Severe Pain (7 - 10)    RADIOLOGY & ADDITIONAL TESTS:  postop cxr stable, no acute pathology, no PTX

## 2024-05-29 NOTE — CONSULT NOTE ADULT - SUBJECTIVE AND OBJECTIVE BOX
PULMONARY SERVICE INITIAL CONSULT NOTE    HPI:  Patient is a 74 yo female, never smoker, with a PMHx of HTN, HLD, PAD, high grade L ICA stenosis s/p L CEA with roof patch on 10/2019 and high-grade R ICA stenosis s/p R CEA on 4/24/2021. Additionally, she has a hx of cancer melanoma 2x: 10+years ago.  She is referred by Dr. Kaelyn Harris for surgical evaluation of a neuroendocrine tumor, confirmed through robotic assisted bronchoscopy, ebus, tbna on 04/19 with Dr. Garcia . Patient seen in same day holding area; Reports no changes to PMHx or medications since last seen by our team. Denies acute or current SOB, chest pain, palpitation, N/V/D, fever/chills, recent illness, or any other concerning symptoms.   ?      REVIEW OF SYSTEMS:  Constitutional: No fever, weight loss or fatigue  Eyes: No eye pain, visual disturbances, or discharge  ENMT:  No difficulty hearing, tinnitus, vertigo; No sinus or throat pain  Neck: No pain, stiffness or neck swelling  Respiratory: see HPI  Cardiovascular: No chest pain, palpitations, dizziness or leg swelling  Gastrointestinal: No abdominal or epigastric pain. No nausea, vomiting or hematemesis; No diarrhea or constipation. No melena or hematochezia.  Genitourinary: No dysuria, frequency, hematuria or incontinence  Neurological: No headaches, memory loss, loss of strength, numbness or tremors  Skin: No itching, burning, rashes or lesions   Lymph Nodes: No enlarged glands  Endocrine: No heat or cold intolerance; No hair loss  Musculoskeletal: No joint pain or swelling; No muscle, back or extremity pain  Psychiatric: No depression, anxiety, mood swings or difficulty sleeping  Heme/Lymph: No easy bruising or bleeding gums  Allergy and Immunologic: No hives or eczema    PAST MEDICAL & SURGICAL HISTORY:  Carotid artery stenosis      Asthma      Osteoporosis      UTI (urinary tract infection)      HTN (hypertension)      HLD (hyperlipidemia)      Nbjgaqsu-Twgk-Tenvkyo syndrome      Seasonal allergies      S/P ectopic pregnancy  cervical 1984      S/P tonsillectomy      Surgery, elective  melanoma removal, back and leg      Surgery, elective  cyst removal left breast      Surgery, elective  carotid B/L          FAMILY HISTORY:  FH: pancreatic cancer (Mother)    FH: osteoporosis (Mother)        SOCIAL HISTORY:  Smoking Status: [ ] Current, [ ] Former, [ ] Never  Pack Years:    MEDICATIONS:  Pulmonary:    Antimicrobials:  ceFAZolin   IVPB 2000 milliGRAM(s) IV Intermittent every 8 hours    Anticoagulants:    Onc:    GI/:  bisacodyl 5 milliGRAM(s) Oral at bedtime  pantoprazole    Tablet 40 milliGRAM(s) Oral before breakfast  senna 2 Tablet(s) Oral at bedtime    Endocrine:  atorvastatin 40 milliGRAM(s) Oral at bedtime    Cardiac:    Other Medications:  acetaminophen     Tablet .. 975 milliGRAM(s) Oral every 6 hours PRN  HYDROmorphone  Injectable 0.2 milliGRAM(s) IV Push every 3 hours PRN  lactated ringers. 1000 milliLiter(s) IV Continuous <Continuous>  latanoprost 0.005% Ophthalmic Solution 1 Drop(s) Both EYES at bedtime  lidocaine   4% Patch 1 Patch Transdermal daily  oxyCODONE    IR 5 milliGRAM(s) Oral every 6 hours PRN  oxyCODONE    IR 10 milliGRAM(s) Oral every 6 hours PRN      Allergies    IV contrast dyes, Shellfish (Rash)  penicillin (Rash)    Intolerances        Vital Signs Last 24 Hrs  T(C): 36.1 (29 May 2024 17:45), Max: 36.8 (29 May 2024 07:00)  T(F): 97 (29 May 2024 17:45), Max: 97.3 (29 May 2024 11:57)  HR: 55 (29 May 2024 15:24) (55 - 75)  BP: 150/67 (29 May 2024 15:24) (109/55 - 158/82)  BP(mean): 96 (29 May 2024 15:24) (78 - 112)  RR: 18 (29 May 2024 15:24) (11 - 20)  SpO2: 100% (29 May 2024 15:24) (96% - 100%)    Parameters below as of 29 May 2024 13:37  Patient On (Oxygen Delivery Method): nasal cannula  O2 Flow (L/min): 2 05-29 @ 07:01  -  05-29 @ 18:30  --------------------------------------------------------  IN: 200 mL / OUT: 120 mL / NET: 80 mL          PHYSICAL EXAM:  General: well appearing resting in bed in NAD   HEENT: normocephalic, atraumatic   Cardio: normal s1/s2  Pulm: Chest tube site c/d/i  GI: +BS 4 quadrants, soft  Extremities: no edema, no calf tenderness  Vascular: 2+ DP  Incisions: left VATS incisions without erythema, purulence or ecchymosis.     LABS:      CBC Full  -  ( 29 May 2024 12:10 )  WBC Count : 10.74 K/uL  RBC Count : 3.49 M/uL  Hemoglobin : 11.3 g/dL  Hematocrit : 33.6 %  Platelet Count - Automated : 215 K/uL  Mean Cell Volume : 96.3 fl  Mean Cell Hemoglobin : 32.4 pg  Mean Cell Hemoglobin Concentration : 33.6 gm/dL  Auto Neutrophil # : 9.68 K/uL  Auto Lymphocyte # : 0.65 K/uL  Auto Monocyte # : 0.26 K/uL  Auto Eosinophil # : 0.04 K/uL  Auto Basophil # : 0.03 K/uL  Auto Neutrophil % : 90.1 %  Auto Lymphocyte % : 6.1 %  Auto Monocyte % : 2.4 %  Auto Eosinophil % : 0.4 %  Auto Basophil % : 0.3 %    05-29    131<L>  |  101  |  28<H>  ----------------------------<  169<H>  5.1   |  21<L>  |  1.02    Ca    9.0      29 May 2024 12:10            Urinalysis Basic - ( 29 May 2024 12:10 )    Color: x / Appearance: x / SG: x / pH: x  Gluc: 169 mg/dL / Ketone: x  / Bili: x / Urobili: x   Blood: x / Protein: x / Nitrite: x   Leuk Esterase: x / RBC: x / WBC x   Sq Epi: x / Non Sq Epi: x / Bacteria: x                RADIOLOGY & ADDITIONAL STUDIES:

## 2024-05-29 NOTE — CONSULT NOTE ADULT - ASSESSMENT
73 year old never smoker patient of Dr. Kaelyn Harris sent for surgical evaluation of a neuroendocrine tumor, confirmed through robotic assisted bronchoscopy, ebus, tbna on 04/19 with Dr. Garcia, now s/p L VATS RA Lingulectomy.     #Post-operative state    Known to our service. Seen in room and examined-is doing well.     Continue excellent post operative care per primary team    should follow up with Dr. Kaelyn Harris

## 2024-05-30 LAB
ANION GAP SERPL CALC-SCNC: 9 MMOL/L — SIGNIFICANT CHANGE UP (ref 5–17)
BASOPHILS # BLD AUTO: 0.01 K/UL — SIGNIFICANT CHANGE UP (ref 0–0.2)
BASOPHILS NFR BLD AUTO: 0.1 % — SIGNIFICANT CHANGE UP (ref 0–2)
BUN SERPL-MCNC: 21 MG/DL — SIGNIFICANT CHANGE UP (ref 7–23)
CALCIUM SERPL-MCNC: 9.5 MG/DL — SIGNIFICANT CHANGE UP (ref 8.4–10.5)
CHLORIDE SERPL-SCNC: 98 MMOL/L — SIGNIFICANT CHANGE UP (ref 96–108)
CO2 SERPL-SCNC: 25 MMOL/L — SIGNIFICANT CHANGE UP (ref 22–31)
CREAT SERPL-MCNC: 1.12 MG/DL — SIGNIFICANT CHANGE UP (ref 0.5–1.3)
EGFR: 52 ML/MIN/1.73M2 — LOW
EOSINOPHIL # BLD AUTO: 0.01 K/UL — SIGNIFICANT CHANGE UP (ref 0–0.5)
EOSINOPHIL NFR BLD AUTO: 0.1 % — SIGNIFICANT CHANGE UP (ref 0–6)
GLUCOSE SERPL-MCNC: 124 MG/DL — HIGH (ref 70–99)
HCT VFR BLD CALC: 35.1 % — SIGNIFICANT CHANGE UP (ref 34.5–45)
HGB BLD-MCNC: 11.6 G/DL — SIGNIFICANT CHANGE UP (ref 11.5–15.5)
IMM GRANULOCYTES NFR BLD AUTO: 0.3 % — SIGNIFICANT CHANGE UP (ref 0–0.9)
LYMPHOCYTES # BLD AUTO: 1.09 K/UL — SIGNIFICANT CHANGE UP (ref 1–3.3)
LYMPHOCYTES # BLD AUTO: 10.8 % — LOW (ref 13–44)
MCHC RBC-ENTMCNC: 32.5 PG — SIGNIFICANT CHANGE UP (ref 27–34)
MCHC RBC-ENTMCNC: 33 GM/DL — SIGNIFICANT CHANGE UP (ref 32–36)
MCV RBC AUTO: 98.3 FL — SIGNIFICANT CHANGE UP (ref 80–100)
MONOCYTES # BLD AUTO: 1.27 K/UL — HIGH (ref 0–0.9)
MONOCYTES NFR BLD AUTO: 12.6 % — SIGNIFICANT CHANGE UP (ref 2–14)
NEUTROPHILS # BLD AUTO: 7.7 K/UL — HIGH (ref 1.8–7.4)
NEUTROPHILS NFR BLD AUTO: 76.1 % — SIGNIFICANT CHANGE UP (ref 43–77)
NRBC # BLD: 0 /100 WBCS — SIGNIFICANT CHANGE UP (ref 0–0)
PLATELET # BLD AUTO: 222 K/UL — SIGNIFICANT CHANGE UP (ref 150–400)
POTASSIUM SERPL-MCNC: 5 MMOL/L — SIGNIFICANT CHANGE UP (ref 3.5–5.3)
POTASSIUM SERPL-SCNC: 5 MMOL/L — SIGNIFICANT CHANGE UP (ref 3.5–5.3)
RBC # BLD: 3.57 M/UL — LOW (ref 3.8–5.2)
RBC # FLD: 14.4 % — SIGNIFICANT CHANGE UP (ref 10.3–14.5)
SODIUM SERPL-SCNC: 132 MMOL/L — LOW (ref 135–145)
WBC # BLD: 10.11 K/UL — SIGNIFICANT CHANGE UP (ref 3.8–10.5)
WBC # FLD AUTO: 10.11 K/UL — SIGNIFICANT CHANGE UP (ref 3.8–10.5)

## 2024-05-30 PROCEDURE — 71045 X-RAY EXAM CHEST 1 VIEW: CPT | Mod: 26

## 2024-05-30 RX ORDER — POLYETHYLENE GLYCOL 3350 17 G/17G
17 POWDER, FOR SOLUTION ORAL DAILY
Refills: 0 | Status: DISCONTINUED | OUTPATIENT
Start: 2024-05-30 | End: 2024-06-06

## 2024-05-30 RX ORDER — VALSARTAN 80 MG/1
240 TABLET ORAL DAILY
Refills: 0 | Status: DISCONTINUED | OUTPATIENT
Start: 2024-05-31 | End: 2024-06-03

## 2024-05-30 RX ORDER — ACETAMINOPHEN 500 MG
975 TABLET ORAL EVERY 6 HOURS
Refills: 0 | Status: DISCONTINUED | OUTPATIENT
Start: 2024-05-30 | End: 2024-06-06

## 2024-05-30 RX ORDER — FLUTICASONE FUROATE, UMECLIDINIUM BROMIDE AND VILANTEROL TRIFENATATE 200; 62.5; 25 UG/1; UG/1; UG/1
1 POWDER RESPIRATORY (INHALATION) DAILY
Refills: 0 | Status: DISCONTINUED | OUTPATIENT
Start: 2024-05-30 | End: 2024-06-06

## 2024-05-30 RX ORDER — VALSARTAN 80 MG/1
120 TABLET ORAL ONCE
Refills: 0 | Status: COMPLETED | OUTPATIENT
Start: 2024-05-30 | End: 2024-05-30

## 2024-05-30 RX ADMIN — OXYCODONE HYDROCHLORIDE 10 MILLIGRAM(S): 5 TABLET ORAL at 15:12

## 2024-05-30 RX ADMIN — Medication 975 MILLIGRAM(S): at 00:45

## 2024-05-30 RX ADMIN — LIDOCAINE 1 PATCH: 4 CREAM TOPICAL at 18:50

## 2024-05-30 RX ADMIN — Medication 975 MILLIGRAM(S): at 19:00

## 2024-05-30 RX ADMIN — OXYCODONE HYDROCHLORIDE 10 MILLIGRAM(S): 5 TABLET ORAL at 09:10

## 2024-05-30 RX ADMIN — ATORVASTATIN CALCIUM 40 MILLIGRAM(S): 80 TABLET, FILM COATED ORAL at 21:21

## 2024-05-30 RX ADMIN — Medication 5 MILLIGRAM(S): at 21:20

## 2024-05-30 RX ADMIN — FLUTICASONE FUROATE, UMECLIDINIUM BROMIDE AND VILANTEROL TRIFENATATE 1 PUFF(S): 200; 62.5; 25 POWDER RESPIRATORY (INHALATION) at 08:58

## 2024-05-30 RX ADMIN — Medication 100 MILLIGRAM(S): at 07:37

## 2024-05-30 RX ADMIN — Medication 975 MILLIGRAM(S): at 13:28

## 2024-05-30 RX ADMIN — OXYCODONE HYDROCHLORIDE 10 MILLIGRAM(S): 5 TABLET ORAL at 21:45

## 2024-05-30 RX ADMIN — VALSARTAN 120 MILLIGRAM(S): 80 TABLET ORAL at 05:39

## 2024-05-30 RX ADMIN — LIDOCAINE 1 PATCH: 4 CREAM TOPICAL at 11:20

## 2024-05-30 RX ADMIN — LATANOPROST 1 DROP(S): 0.05 SOLUTION/ DROPS OPHTHALMIC; TOPICAL at 21:28

## 2024-05-30 RX ADMIN — Medication 975 MILLIGRAM(S): at 14:10

## 2024-05-30 RX ADMIN — OXYCODONE HYDROCHLORIDE 10 MILLIGRAM(S): 5 TABLET ORAL at 16:00

## 2024-05-30 RX ADMIN — Medication 975 MILLIGRAM(S): at 00:18

## 2024-05-30 RX ADMIN — LIDOCAINE 1 PATCH: 4 CREAM TOPICAL at 23:00

## 2024-05-30 RX ADMIN — POLYETHYLENE GLYCOL 3350 17 GRAM(S): 17 POWDER, FOR SOLUTION ORAL at 15:36

## 2024-05-30 RX ADMIN — Medication 100 MILLIGRAM(S): at 00:08

## 2024-05-30 RX ADMIN — PANTOPRAZOLE SODIUM 40 MILLIGRAM(S): 20 TABLET, DELAYED RELEASE ORAL at 06:59

## 2024-05-30 RX ADMIN — OXYCODONE HYDROCHLORIDE 10 MILLIGRAM(S): 5 TABLET ORAL at 08:23

## 2024-05-30 RX ADMIN — Medication 975 MILLIGRAM(S): at 20:00

## 2024-05-30 RX ADMIN — OXYCODONE HYDROCHLORIDE 10 MILLIGRAM(S): 5 TABLET ORAL at 21:20

## 2024-05-30 RX ADMIN — Medication 975 MILLIGRAM(S): at 08:25

## 2024-05-30 RX ADMIN — ENOXAPARIN SODIUM 40 MILLIGRAM(S): 100 INJECTION SUBCUTANEOUS at 19:01

## 2024-05-30 RX ADMIN — Medication 975 MILLIGRAM(S): at 07:04

## 2024-05-30 RX ADMIN — VALSARTAN 120 MILLIGRAM(S): 80 TABLET ORAL at 08:40

## 2024-05-30 NOTE — PROGRESS NOTE ADULT - SUBJECTIVE AND OBJECTIVE BOX
Patient discussed on morning rounds with       Operation / Date:     SUBJECTIVE ASSESSMENT:  73y Female         Vital Signs Last 24 Hrs  T(C): 36.1 (30 May 2024 14:25), Max: 36.3 (30 May 2024 09:16)  T(F): 97 (30 May 2024 14:25), Max: 97.3 (30 May 2024 09:16)  HR: 64 (30 May 2024 08:25) (60 - 66)  BP: 181/83 (30 May 2024 08:25) (163/91 - 198/87)  BP(mean): 119 (30 May 2024 08:25) (108 - 125)  RR: 19 (30 May 2024 08:25) (16 - 19)  SpO2: 99% (30 May 2024 08:25) (98% - 99%)    Parameters below as of 30 May 2024 08:25  Patient On (Oxygen Delivery Method): room air      I&O's Detail    29 May 2024 07:01  -  30 May 2024 07:00  --------------------------------------------------------  IN:    IV PiggyBack: 50 mL    Lactated Ringers: 550 mL    Oral Fluid: 220 mL  Total IN: 820 mL    OUT:    Chest Tube (mL): 210 mL    Voided (mL): 300 mL  Total OUT: 510 mL    Total NET: 310 mL      30 May 2024 07:01  -  30 May 2024 15:51  --------------------------------------------------------  IN:    IV PiggyBack: 50 mL  Total IN: 50 mL    OUT:    Chest Tube (mL): 50 mL  Total OUT: 50 mL    Total NET: 0 mL          CHEST TUBE:  Yes/No. AIR LEAKS: Yes/No. Suction / H2O SEAL.   LOREN DRAIN:  Yes/No.  EPICARDIAL WIRES: Yes/No.  TIE DOWNS: Yes/No.  WEAVER: Yes/No.    PHYSICAL EXAM:    General:     Neurological:    Cardiovascular:    Respiratory:    Gastrointestinal:    Extremities:    Vascular:    Incision Sites:    LABS:                        11.6   10.11 )-----------( 222      ( 30 May 2024 05:30 )             35.1       COUMADIN:  Yes/No. REASON: .        05-30    132<L>  |  98  |  21  ----------------------------<  124<H>  5.0   |  25  |  1.12    Ca    9.5      30 May 2024 05:30        Urinalysis Basic - ( 30 May 2024 05:30 )    Color: x / Appearance: x / SG: x / pH: x  Gluc: 124 mg/dL / Ketone: x  / Bili: x / Urobili: x   Blood: x / Protein: x / Nitrite: x   Leuk Esterase: x / RBC: x / WBC x   Sq Epi: x / Non Sq Epi: x / Bacteria: x        MEDICATIONS  (STANDING):  atorvastatin 40 milliGRAM(s) Oral at bedtime  bisacodyl 5 milliGRAM(s) Oral at bedtime  enoxaparin Injectable 40 milliGRAM(s) SubCutaneous every 24 hours  fluticasone furoate/umeclidinium/vilanterol 100-62.5-25 MICROgram(s) Inhaler 1 Puff(s) Inhalation daily  lactated ringers. 1000 milliLiter(s) (50 mL/Hr) IV Continuous <Continuous>  latanoprost 0.005% Ophthalmic Solution 1 Drop(s) Both EYES at bedtime  lidocaine   4% Patch 1 Patch Transdermal daily  pantoprazole    Tablet 40 milliGRAM(s) Oral before breakfast  polyethylene glycol 3350 17 Gram(s) Oral daily  Rhopressa 0.02% eye drops 1 Drop(s) 1 Drop(s) Both EYES at bedtime  senna 2 Tablet(s) Oral at bedtime  Talfuprost 0.0015% Eye Drops 1 Drop(s) 1 Drop(s) Both EYES at bedtime    MEDICATIONS  (PRN):  acetaminophen     Tablet .. 975 milliGRAM(s) Oral every 6 hours PRN Mild Pain (1 - 3)  HYDROmorphone  Injectable 0.2 milliGRAM(s) IV Push every 3 hours PRN breakthrough pain  oxyCODONE    IR 5 milliGRAM(s) Oral every 6 hours PRN Moderate Pain (4 - 6)  oxyCODONE    IR 10 milliGRAM(s) Oral every 6 hours PRN Severe Pain (7 - 10)        RADIOLOGY & ADDITIONAL TESTS:     Patient discussed on morning rounds with Dr. Rader and Dr. Olmedo     Operation / Date: Bronchoscopy, LVATS, RA, Lingulectomy, MLND, 5/29/24    SUBJECTIVE ASSESSMENT:  73y Female w/ main complaint of L chest pain this AM. Worsened with deep breathing. Otherwise denies headaches, dizziness, sob, palpitations, abd pain, N/V/D.     Vital Signs Last 24 Hrs  T(C): 36.1 (30 May 2024 14:25), Max: 36.3 (30 May 2024 09:16)  T(F): 97 (30 May 2024 14:25), Max: 97.3 (30 May 2024 09:16)  HR: 64 (30 May 2024 08:25) (60 - 66)  BP: 181/83 (30 May 2024 08:25) (163/91 - 198/87)  BP(mean): 119 (30 May 2024 08:25) (108 - 125)  RR: 19 (30 May 2024 08:25) (16 - 19)  SpO2: 99% (30 May 2024 08:25) (98% - 99%)    Parameters below as of 30 May 2024 08:25  Patient On (Oxygen Delivery Method): room air      I&O's Detail    29 May 2024 07:01  -  30 May 2024 07:00  --------------------------------------------------------  IN:    IV PiggyBack: 50 mL    Lactated Ringers: 550 mL    Oral Fluid: 220 mL  Total IN: 820 mL    OUT:    Chest Tube (mL): 210 mL    Voided (mL): 300 mL  Total OUT: 510 mL    Total NET: 310 mL      30 May 2024 07:01  -  30 May 2024 15:51  --------------------------------------------------------  IN:    IV PiggyBack: 50 mL  Total IN: 50 mL    OUT:    Chest Tube (mL): 50 mL  Total OUT: 50 mL    Total NET: 0 mL      CHEST TUBE:  Yes AIR LEAKS: Yes. Type 1/5 Suction   LOREN DRAIN:  Yes/No.  EPICARDIAL WIRES: No.  TIE DOWNS: Yes   WEAVER: No.    PHYSICAL EXAM:  Appearance: No acute distress.  Neurologic: AAOx3, no AMS or focal deficits.  Responds appropriately to verbal and physical stimuli; exhibits purposeful movement in all extremities  Cardiovascular: RRR, S1 S2. No m/r/g.  Respiratory: +L lung crepitus. No acute respiratory distress. Right lung CTA, no w/r/r.   Gastrointestinal:  Soft, non-tender, non-distended, + BS.	  Skin: No rashes. No ecchymoses. No cyanosis.  Extremities: Exhibits normal range of motion, no clubbing, cyanosis or edema.  Vascular: non palpable pulses, normal cap refill   Incision Sites: left VATS incisions without erythema, purulence or ecchymosis with steri strips in place.     LABS:                        11.6   10.11 )-----------( 222      ( 30 May 2024 05:30 )             35.1       COUMADIN:  Yes/No. REASON: .        05-30    132<L>  |  98  |  21  ----------------------------<  124<H>  5.0   |  25  |  1.12    Ca    9.5      30 May 2024 05:30        Urinalysis Basic - ( 30 May 2024 05:30 )    Color: x / Appearance: x / SG: x / pH: x  Gluc: 124 mg/dL / Ketone: x  / Bili: x / Urobili: x   Blood: x / Protein: x / Nitrite: x   Leuk Esterase: x / RBC: x / WBC x   Sq Epi: x / Non Sq Epi: x / Bacteria: x        MEDICATIONS  (STANDING):  atorvastatin 40 milliGRAM(s) Oral at bedtime  bisacodyl 5 milliGRAM(s) Oral at bedtime  enoxaparin Injectable 40 milliGRAM(s) SubCutaneous every 24 hours  fluticasone furoate/umeclidinium/vilanterol 100-62.5-25 MICROgram(s) Inhaler 1 Puff(s) Inhalation daily  lactated ringers. 1000 milliLiter(s) (50 mL/Hr) IV Continuous <Continuous>  latanoprost 0.005% Ophthalmic Solution 1 Drop(s) Both EYES at bedtime  lidocaine   4% Patch 1 Patch Transdermal daily  pantoprazole    Tablet 40 milliGRAM(s) Oral before breakfast  polyethylene glycol 3350 17 Gram(s) Oral daily  Rhopressa 0.02% eye drops 1 Drop(s) 1 Drop(s) Both EYES at bedtime  senna 2 Tablet(s) Oral at bedtime  Talfuprost 0.0015% Eye Drops 1 Drop(s) 1 Drop(s) Both EYES at bedtime    MEDICATIONS  (PRN):  acetaminophen     Tablet .. 975 milliGRAM(s) Oral every 6 hours PRN Mild Pain (1 - 3)  HYDROmorphone  Injectable 0.2 milliGRAM(s) IV Push every 3 hours PRN breakthrough pain  oxyCODONE    IR 5 milliGRAM(s) Oral every 6 hours PRN Moderate Pain (4 - 6)  oxyCODONE    IR 10 milliGRAM(s) Oral every 6 hours PRN Severe Pain (7 - 10)        RADIOLOGY & ADDITIONAL TESTS:  Xray Chest 1 View- PORTABLE-Routine (Xray Chest 1 View- PORTABLE-Routine in AM.) (05.30.24 @ 06:13) >  IMPRESSION:    Possible small left pneumothorax. Postop changes left hemithorax with   left chest tube. Mild left chest wall subcutaneous emphysema. No lung   consolidation or pleural effusion.      Xray Chest 1 View-PORTABLE IMMEDIATE (Xray Chest 1 View-PORTABLE IMMEDIATE .) (05.30.24 @ 13:07) >  IMPRESSION:    The left pneumothorax has partially improved compared to prior exam   earlier same day. Postop changes and left chest tube again noted. No   acute infiltrates appearing. Left chest wall subcutaneous emphysema.

## 2024-05-30 NOTE — PROGRESS NOTE ADULT - ASSESSMENT
74 y/o F, never smoker, with a PMHx of HTN, HLD, PAD, b/l carotid stenosis (s/p L CEA with roof patch on 10/2019, s/p R CEA on 4/24/2021), hx of cancer melanoma 2x: 10+years ago, referred by Dr. Kaelyn Harris for neuroendocrine tumor, confirmed through robotic assisted bronchoscopy, ebus, tbna on 04/19 with Dr. Garcia. Patient now s/p L VATS RA Lingulectomy MLND (5/29/24), arrived to PACU with 1 CT in place, on suction, no air leak. POD1, patient on waterseal in AM w/ noted airleak just prior to pulling CT, so CT left in place, placed to wall suction w/ repeat CXR obtained. Will leave on wall suction overnight and obtain updated CXR in the AM.     Neuro: No delirium. No focal deficits. Pain improved on current regimen.   - Pain: Tylenol 975mg q6hrs,   -Pain well controlled with current regimen. PRN's: acetaminophen, dilaudid, oxy 5-10   -lidocaine patch     HEENT:  -continue home latanoprost     Cardiovascular:   -HLD: statin 40mg daily   -Hemodynamically stable.   -Monitor: BP, HR, tele    Respiratory:   -POD0 L VATS RA Lingulectomy MLND     -CT in place, on suction, no air leak   -Oxygenating well on room air  -Encourage continued use of IS 10x/hr and frequent ambulation  -CXR: no acute pathology     GI:  -GI PPX: pantoprazole 40mg daily   -PO Diet: clear liquid   -Bowel Regimen: senna     Renal / :  -Continue to monitor renal function: BUN/Cr 28/1.02   -Monitor I/O's daily     Endocrine:    -No hx of DM or thyroid dx  -A1c: pending   -TSH: pending     Hematologic:  -CBC: H/H- 11/33  -Coagulation Panel: WNL     ID:  -Temperature: afebrile   -CBC: WBC- 10.7   -Continue to observe for SIRS/Sepsis Syndrome.    Prophylaxis:  -DVT prophylaxis with lovenox 40mg Q24 hours   -Continue with SCD's b/l while patient is at rest     Disposition:  -chest tube management      72 y/o F, never smoker, with a PMHx of HTN, HLD, PAD, b/l carotid stenosis (s/p L CEA with roof patch on 10/2019, s/p R CEA on 4/24/2021), hx of cancer melanoma 2x: 10+years ago, referred by Dr. Kaelyn Harris for neuroendocrine tumor, confirmed through robotic assisted bronchoscopy, ebus, tbna on 04/19 with Dr. Garcia. Patient now s/p L VATS RA Lingulectomy MLND (5/29/24), arrived to PACU with 1 CT in place, on suction, no air leak. POD1, patient on waterseal in AM w/ noted airleak just prior to pulling CT, so CT left in place, placed to wall suction w/ repeat CXR obtained. Will leave on wall suction overnight and obtain updated CXR in the AM. Patient hypertensive this AM, given second dose of Valsartan 120mg, now back on home Valsartan dosing w/ improved pressures.     Neuro: No delirium. No focal deficits. Pain improved on current regimen.   - Pain: Tylenol 975mg q6hrs, oxy 5mg/10mg q6hr prn, Dilaudid .2mg q3hrs prn, lidocaine patch daily     HEENT:  -continue home latanoprost     Cardiovascular: Hemodynamically stable. HR controlled.  - HTN: valsartan 240mg daily   - HLD: atorvastatin 40mg daily   - Monitor HR and BP     Respiratory: 02 Sat = 98% on RA.  -Encourage C+DB and Use of IS 10x / hr while awake.  -POD 1 L VATS RA Lingulectomy MLND:    CT in place was on waterseal overnight 5/30, w/ noted airleak this AM, placed back on wall suction 5/30    Follow up repeat CXR tomorrow AM   -CXR: L pneumothorax partially improved compared to prior     GI: Stable.  -PPX: pantoprazole 40mg daily   -PO Diet.  - Constipation Prophylaxis: senna     Renal / :  -Monitor renal function: Cr 1.12, BUN 21  -Monitor I/O's.    Endocrine:    -A1c: 6.1%   -TSH: 3.820    Hematologic:  -CBC: RBC 3.57, Hgb 11.6, plt 222  -Coagulation Panel: PT 9.5, INR .83, PTT 29.9     ID:  -Temperature: afebrile   -CBC: WBC 10.11  -Observe for SIRS/Sepsis Syndrome.    Prophylaxis:  -DVT prophylaxis with lovenox 40mg Q24 hours   -Continue with SCD's b/l while patient is at rest     Disposition:  -Telemetry, monitor CT

## 2024-05-31 PROCEDURE — 71045 X-RAY EXAM CHEST 1 VIEW: CPT | Mod: 26,76

## 2024-05-31 RX ORDER — MULTIVIT WITH MIN/MFOLATE/K2 340-15/3 G
296 POWDER (GRAM) ORAL ONCE
Refills: 0 | Status: COMPLETED | OUTPATIENT
Start: 2024-05-31 | End: 2024-05-31

## 2024-05-31 RX ADMIN — Medication 975 MILLIGRAM(S): at 06:13

## 2024-05-31 RX ADMIN — Medication 296 MILLILITER(S): at 22:24

## 2024-05-31 RX ADMIN — OXYCODONE HYDROCHLORIDE 10 MILLIGRAM(S): 5 TABLET ORAL at 21:16

## 2024-05-31 RX ADMIN — Medication 975 MILLIGRAM(S): at 12:30

## 2024-05-31 RX ADMIN — SENNA PLUS 2 TABLET(S): 8.6 TABLET ORAL at 21:15

## 2024-05-31 RX ADMIN — PANTOPRAZOLE SODIUM 40 MILLIGRAM(S): 20 TABLET, DELAYED RELEASE ORAL at 06:13

## 2024-05-31 RX ADMIN — OXYCODONE HYDROCHLORIDE 10 MILLIGRAM(S): 5 TABLET ORAL at 14:38

## 2024-05-31 RX ADMIN — Medication 975 MILLIGRAM(S): at 00:49

## 2024-05-31 RX ADMIN — Medication 975 MILLIGRAM(S): at 18:30

## 2024-05-31 RX ADMIN — LATANOPROST 1 DROP(S): 0.05 SOLUTION/ DROPS OPHTHALMIC; TOPICAL at 21:22

## 2024-05-31 RX ADMIN — Medication 975 MILLIGRAM(S): at 07:00

## 2024-05-31 RX ADMIN — ENOXAPARIN SODIUM 40 MILLIGRAM(S): 100 INJECTION SUBCUTANEOUS at 18:09

## 2024-05-31 RX ADMIN — Medication 975 MILLIGRAM(S): at 01:30

## 2024-05-31 RX ADMIN — Medication 975 MILLIGRAM(S): at 18:09

## 2024-05-31 RX ADMIN — POLYETHYLENE GLYCOL 3350 17 GRAM(S): 17 POWDER, FOR SOLUTION ORAL at 07:11

## 2024-05-31 RX ADMIN — ATORVASTATIN CALCIUM 40 MILLIGRAM(S): 80 TABLET, FILM COATED ORAL at 21:16

## 2024-05-31 RX ADMIN — OXYCODONE HYDROCHLORIDE 10 MILLIGRAM(S): 5 TABLET ORAL at 22:00

## 2024-05-31 RX ADMIN — VALSARTAN 240 MILLIGRAM(S): 80 TABLET ORAL at 06:13

## 2024-05-31 RX ADMIN — OXYCODONE HYDROCHLORIDE 10 MILLIGRAM(S): 5 TABLET ORAL at 07:45

## 2024-05-31 RX ADMIN — OXYCODONE HYDROCHLORIDE 10 MILLIGRAM(S): 5 TABLET ORAL at 14:30

## 2024-05-31 RX ADMIN — OXYCODONE HYDROCHLORIDE 10 MILLIGRAM(S): 5 TABLET ORAL at 07:11

## 2024-05-31 RX ADMIN — LIDOCAINE 1 PATCH: 4 CREAM TOPICAL at 12:31

## 2024-05-31 RX ADMIN — FLUTICASONE FUROATE, UMECLIDINIUM BROMIDE AND VILANTEROL TRIFENATATE 1 PUFF(S): 200; 62.5; 25 POWDER RESPIRATORY (INHALATION) at 12:38

## 2024-05-31 RX ADMIN — LIDOCAINE 1 PATCH: 4 CREAM TOPICAL at 18:04

## 2024-05-31 NOTE — DIETITIAN INITIAL EVALUATION ADULT - PERSON TAUGHT/METHOD
adequate PO intake with emphasis on protein for post-op healing/verbal instruction/patient instructed

## 2024-05-31 NOTE — DIETITIAN NUTRITION RISK NOTIFICATION - ADDITIONAL COMMENTS/DIETITIAN RECOMMENDATIONS
1. Continue Regular diet   2. Encourage and monitor PO intake, honor preferences as able   3. Monitor labs, wt trends, GI function, and skin integrity   4. Pain and bowel regimen per team   5. RD to remain available for additional nutrition interventions and diet edu as needed  patient instructed; verbal instruction; adequate PO intake with emphasis on protein for post-op healing

## 2024-05-31 NOTE — DIETITIAN INITIAL EVALUATION ADULT - OTHER INFO
72 yo female, never smoker, with a PMHx of HTN, HLD, PAD, high grade L ICA stenosis s/p L CEA with roof patch on 10/2019 and high-grade R ICA stenosis s/p R CEA on 4/24/2021. Additionally, she has a hx of cancer melanoma 2x: 10+years ago. She is referred by Dr. Kaelyn Harris for surgical evaluation of a neuroendocrine tumor, confirmed through robotic assisted bronchoscopy, ebus, tbna on 04/19 with Dr. Garcia. S/p L VATS 5/29    Patient seen for nutrition assessment. Labs and medications reviewed. Na 132<L>, glucose 124-169 x 24 hours, HgbA1c 6.1%<H>. Ordered for LR, miralax, dulcolax, protonix, senna. No pressure injuries or edema documented, surgical incision R chest. Pt endorses constipation, last BM 5/29 per flowsheets- on bowel regimen. Current diet order: Regular. Confirms shellfish allergy. No difficulty chewing/swallowing reported. Reports good appetite. Pt consumed 75% of breakfast which included eggs and whole wheat toast. Endorses great appetite and intake PTA. Dosing weight: 125 pounds, BMI 18.5, reports UBW of 120 pounds and denies recent weight loss. Patient with slight frame, however observed with no overt signs and symptoms of muscle or fat wasting. Based on ASPEN guidelines, pt does not meet criteria for malnutrition. Nutrition education provided. Encouraged continued PO intake with emphasis on protein. Pt expressed appreciation and understanding. See nutrition recommendations below.

## 2024-05-31 NOTE — PROGRESS NOTE ADULT - ASSESSMENT
74 y/o F, never smoker, with a PMHx of HTN, HLD, PAD, b/l carotid stenosis (s/p L CEA with roof patch on 10/2019, s/p R CEA on 4/24/2021), hx of cancer melanoma 2x: 10+years ago, referred by Dr. Kaelyn Harris for neuroendocrine tumor, confirmed through robotic assisted bronchoscopy, ebus, tbna on 04/19 with Dr. Garcia. Patient now s/p L VATS RA Lingulectomy MLND (5/29/24), arrived to PACU with 1 CT in place, on suction, no air leak. POD1, patient on waterseal in AM w/ noted airleak just prior to pulling CT, so CT left in place, placed to wall suction w/ repeat CXR obtained. Will leave on wall suction overnight and obtain updated CXR in the AM. Patient hypertensive this AM, given second dose of Valsartan 120mg, now back on home Valsartan dosing w/ improved pressures.     Neuro: No delirium. No focal deficits. Pain improved on current regimen.   - Pain: Tylenol 975mg q6hrs, oxy 5mg/10mg q6hr prn, Dilaudid .2mg q3hrs prn, lidocaine patch daily     HEENT:  -continue home latanoprost     Cardiovascular: Hemodynamically stable. HR controlled.  - HTN: valsartan 240mg daily   - HLD: atorvastatin 40mg daily   - Monitor HR and BP     Respiratory: 02 Sat = 98% on RA.  -Encourage C+DB and Use of IS 10x / hr while awake.  -POD 2 L VATS RA Lingulectomy MLND:    CT in place was on waterseal overnight 5/30, w/ noted airleak this AM, placed back on wall suction 5/30   -CT watersealed in AM on 5/31, noted to have intermittent airleak, keep on waterseal today   Follow up repeat CXR tomorrow AM   -CXR: L pneumothorax    GI: Stable.  -PPX: pantoprazole 40mg daily   -PO Diet.  - Constipation Prophylaxis: senna     Renal / :  -Monitor I/O's.    Endocrine:    -A1c: 6.1%   -TSH: 3.820    Hematologic:  -No overt signs of bleeding    ID:  -Temperature: afebrile     Prophylaxis:  -DVT prophylaxis with lovenox 40mg Q24 hours   -Continue with SCD's b/l while patient is at rest     Disposition:  -Telemetry, monitor CT

## 2024-05-31 NOTE — DIETITIAN INITIAL EVALUATION ADULT - PERTINENT MEDS FT
MEDICATIONS  (STANDING):  acetaminophen     Tablet .. 975 milliGRAM(s) Oral every 6 hours  atorvastatin 40 milliGRAM(s) Oral at bedtime  bisacodyl 5 milliGRAM(s) Oral at bedtime  enoxaparin Injectable 40 milliGRAM(s) SubCutaneous every 24 hours  fluticasone furoate/umeclidinium/vilanterol 100-62.5-25 MICROgram(s) Inhaler 1 Puff(s) Inhalation daily  lactated ringers. 1000 milliLiter(s) (50 mL/Hr) IV Continuous <Continuous>  latanoprost 0.005% Ophthalmic Solution 1 Drop(s) Both EYES at bedtime  lidocaine   4% Patch 1 Patch Transdermal daily  pantoprazole    Tablet 40 milliGRAM(s) Oral before breakfast  polyethylene glycol 3350 17 Gram(s) Oral daily  Rhopressa 0.02% eye drops 1 Drop(s) 1 Drop(s) Both EYES at bedtime  senna 2 Tablet(s) Oral at bedtime  Talfuprost 0.0015% Eye Drops 1 Drop(s) 1 Drop(s) Both EYES at bedtime  valsartan 240 milliGRAM(s) Oral daily    MEDICATIONS  (PRN):  HYDROmorphone  Injectable 0.2 milliGRAM(s) IV Push every 3 hours PRN breakthrough pain  oxyCODONE    IR 5 milliGRAM(s) Oral every 6 hours PRN Moderate Pain (4 - 6)  oxyCODONE    IR 10 milliGRAM(s) Oral every 6 hours PRN Severe Pain (7 - 10)

## 2024-05-31 NOTE — DIETITIAN INITIAL EVALUATION ADULT - NSICDXPASTMEDICALHX_GEN_ALL_CORE_FT
PAST MEDICAL HISTORY:  Asthma     Carotid artery stenosis     HLD (hyperlipidemia)     HTN (hypertension)     Osteoporosis     Vhsfavbu-Maod-Fbhclhm syndrome     UTI (urinary tract infection)

## 2024-05-31 NOTE — DIETITIAN INITIAL EVALUATION ADULT - NSFNSGIIOFT_GEN_A_CORE
05-30-24 @ 07:01  -  05-31-24 @ 07:00  --------------------------------------------------------  OUT:    Chest Tube (mL): 130 mL  Total OUT: 130 mL    Total NET: -130 mL      05-31-24 @ 07:01  -  05-31-24 @ 15:23  --------------------------------------------------------  OUT:    Chest Tube (mL): 10 mL  Total OUT: 10 mL    Total NET: -10 mL

## 2024-05-31 NOTE — DIETITIAN INITIAL EVALUATION ADULT - PERTINENT LABORATORY DATA
05-30    132<L>  |  98  |  21  ----------------------------<  124<H>  5.0   |  25  |  1.12    Ca    9.5      30 May 2024 05:30    A1C with Estimated Average Glucose Result: 6.1 % (05-29-24 @ 12:10)

## 2024-05-31 NOTE — PROGRESS NOTE ADULT - SUBJECTIVE AND OBJECTIVE BOX
Patient discussed on morning rounds with Dr. Rader    Operation / Date: 5/29/24 LVATs RA Lingulectomy MLND    SUBJECTIVE ASSESSMENT:  73y Female seen and examined at bedside.  Patient with no complaints.  Denies chest pain, shortness of breath, nausea, vomiting.      Vital Signs Last 24 Hrs  T(C): 36.1 (31 May 2024 05:12), Max: 37.6 (30 May 2024 17:01)  T(F): 97 (31 May 2024 05:12), Max: 99.6 (30 May 2024 17:01)  HR: 60 (31 May 2024 06:15) (58 - 74)  BP: 135/63 (31 May 2024 06:15) (110/60 - 138/63)  BP(mean): 90 (31 May 2024 06:15) (80 - 93)  RR: 16 (31 May 2024 06:15) (15 - 19)  SpO2: 97% (31 May 2024 06:15) (91% - 97%)    Parameters below as of 31 May 2024 06:15  Patient On (Oxygen Delivery Method): room air      I&O's Detail    30 May 2024 07:01  -  31 May 2024 07:00  --------------------------------------------------------  IN:    IV PiggyBack: 50 mL  Total IN: 50 mL    OUT:    Chest Tube (mL): 130 mL  Total OUT: 130 mL    Total NET: -80 mL      31 May 2024 07:01  -  31 May 2024 10:16  --------------------------------------------------------  IN:  Total IN: 0 mL    OUT:    Chest Tube (mL): 10 mL  Total OUT: 10 mL    Total NET: -10 mL          CHEST TUBE:  Yes. AIR LEAKS: Yes. Intermittent H2O SEAL.   LOREN DRAIN:  No.  EPICARDIAL WIRES: No.  TIE DOWNS: No.  WEAVER: No.    PHYSICAL EXAM:    GEN: NAD, looks comfortable  Psych: Mood appropriate  Neuro: A&Ox3.  No focal deficits.  Moving all extremities.   HEENT: No obvious abnormalities  CV: S1S2, regular, no murmurs appreciated.  No carotid bruits.  No JVD  Lungs: Clear B/L.  No wheezing, rales or rhonchi  ABD: Soft, non-tender, non-distended.  +Bowel sounds  EXT: Warm and well perfused.  No peripheral edema noted  Musculoskeletal: Moving all extremities with normal ROM, no joint swelling  PV: Pedal pulses palpable   Incision: Left VATs incision CDI, 1 CT in place    LABS:                        11.6   10.11 )-----------( 222      ( 30 May 2024 05:30 )             35.1       COUMADIN:  No. REASON: .        05-30    132<L>  |  98  |  21  ----------------------------<  124<H>  5.0   |  25  |  1.12    Ca    9.5      30 May 2024 05:30        Urinalysis Basic - ( 30 May 2024 05:30 )    Color: x / Appearance: x / SG: x / pH: x  Gluc: 124 mg/dL / Ketone: x  / Bili: x / Urobili: x   Blood: x / Protein: x / Nitrite: x   Leuk Esterase: x / RBC: x / WBC x   Sq Epi: x / Non Sq Epi: x / Bacteria: x        MEDICATIONS  (STANDING):  acetaminophen     Tablet .. 975 milliGRAM(s) Oral every 6 hours  atorvastatin 40 milliGRAM(s) Oral at bedtime  bisacodyl 5 milliGRAM(s) Oral at bedtime  enoxaparin Injectable 40 milliGRAM(s) SubCutaneous every 24 hours  fluticasone furoate/umeclidinium/vilanterol 100-62.5-25 MICROgram(s) Inhaler 1 Puff(s) Inhalation daily  lactated ringers. 1000 milliLiter(s) (50 mL/Hr) IV Continuous <Continuous>  latanoprost 0.005% Ophthalmic Solution 1 Drop(s) Both EYES at bedtime  lidocaine   4% Patch 1 Patch Transdermal daily  pantoprazole    Tablet 40 milliGRAM(s) Oral before breakfast  polyethylene glycol 3350 17 Gram(s) Oral daily  Rhopressa 0.02% eye drops 1 Drop(s) 1 Drop(s) Both EYES at bedtime  senna 2 Tablet(s) Oral at bedtime  Talfuprost 0.0015% Eye Drops 1 Drop(s) 1 Drop(s) Both EYES at bedtime  valsartan 240 milliGRAM(s) Oral daily    MEDICATIONS  (PRN):  HYDROmorphone  Injectable 0.2 milliGRAM(s) IV Push every 3 hours PRN breakthrough pain  oxyCODONE    IR 5 milliGRAM(s) Oral every 6 hours PRN Moderate Pain (4 - 6)  oxyCODONE    IR 10 milliGRAM(s) Oral every 6 hours PRN Severe Pain (7 - 10)        RADIOLOGY & ADDITIONAL TESTS:     Antonietta Pineda, PA Antonietta Pineda, PA

## 2024-05-31 NOTE — DIETITIAN INITIAL EVALUATION ADULT - OTHER CALCULATIONS
Pt within % IBW (145 pounds, 86%) thus actual body weight used for all calculations (125 pounds). Needs adjusted for age, post-op healing. Fluids per team in view of hyponatremia.

## 2024-05-31 NOTE — DIETITIAN INITIAL EVALUATION ADULT - ADD RECOMMEND
1. Continue Regular diet   2. Encourage and monitor PO intake, honor preferences as able   3. Monitor labs, wt trends, GI function, and skin integrity   4. Pain and bowel regimen per team   5. RD to remain available for additional nutrition interventions and diet edu as needed

## 2024-06-01 PROCEDURE — 71045 X-RAY EXAM CHEST 1 VIEW: CPT | Mod: 26

## 2024-06-01 RX ORDER — HYDRALAZINE HCL 50 MG
5 TABLET ORAL ONCE
Refills: 0 | Status: COMPLETED | OUTPATIENT
Start: 2024-06-01 | End: 2024-06-01

## 2024-06-01 RX ORDER — HYDRALAZINE HCL 50 MG
10 TABLET ORAL ONCE
Refills: 0 | Status: COMPLETED | OUTPATIENT
Start: 2024-06-01 | End: 2024-06-01

## 2024-06-01 RX ORDER — THROMBIN (BOVINE) 10000 UNIT
20000 KIT TOPICAL ONCE
Refills: 0 | Status: DISCONTINUED | OUTPATIENT
Start: 2024-06-01 | End: 2024-06-01

## 2024-06-01 RX ORDER — ALPRAZOLAM 0.25 MG
0.25 TABLET ORAL ONCE
Refills: 0 | Status: DISCONTINUED | OUTPATIENT
Start: 2024-06-01 | End: 2024-06-01

## 2024-06-01 RX ADMIN — Medication 975 MILLIGRAM(S): at 07:53

## 2024-06-01 RX ADMIN — Medication 0.25 MILLIGRAM(S): at 13:31

## 2024-06-01 RX ADMIN — Medication 975 MILLIGRAM(S): at 19:32

## 2024-06-01 RX ADMIN — OXYCODONE HYDROCHLORIDE 10 MILLIGRAM(S): 5 TABLET ORAL at 14:30

## 2024-06-01 RX ADMIN — ENOXAPARIN SODIUM 40 MILLIGRAM(S): 100 INJECTION SUBCUTANEOUS at 19:28

## 2024-06-01 RX ADMIN — SENNA PLUS 2 TABLET(S): 8.6 TABLET ORAL at 21:44

## 2024-06-01 RX ADMIN — ATORVASTATIN CALCIUM 40 MILLIGRAM(S): 80 TABLET, FILM COATED ORAL at 21:45

## 2024-06-01 RX ADMIN — PANTOPRAZOLE SODIUM 40 MILLIGRAM(S): 20 TABLET, DELAYED RELEASE ORAL at 07:33

## 2024-06-01 RX ADMIN — Medication 975 MILLIGRAM(S): at 19:28

## 2024-06-01 RX ADMIN — Medication 975 MILLIGRAM(S): at 14:31

## 2024-06-01 RX ADMIN — OXYCODONE HYDROCHLORIDE 10 MILLIGRAM(S): 5 TABLET ORAL at 05:46

## 2024-06-01 RX ADMIN — Medication 975 MILLIGRAM(S): at 12:22

## 2024-06-01 RX ADMIN — POLYETHYLENE GLYCOL 3350 17 GRAM(S): 17 POWDER, FOR SOLUTION ORAL at 12:22

## 2024-06-01 RX ADMIN — FLUTICASONE FUROATE, UMECLIDINIUM BROMIDE AND VILANTEROL TRIFENATATE 1 PUFF(S): 200; 62.5; 25 POWDER RESPIRATORY (INHALATION) at 13:30

## 2024-06-01 RX ADMIN — LATANOPROST 1 DROP(S): 0.05 SOLUTION/ DROPS OPHTHALMIC; TOPICAL at 21:45

## 2024-06-01 RX ADMIN — Medication 975 MILLIGRAM(S): at 01:29

## 2024-06-01 RX ADMIN — Medication 10 MILLIGRAM(S): at 06:00

## 2024-06-01 RX ADMIN — OXYCODONE HYDROCHLORIDE 10 MILLIGRAM(S): 5 TABLET ORAL at 19:27

## 2024-06-01 RX ADMIN — Medication 975 MILLIGRAM(S): at 00:59

## 2024-06-01 RX ADMIN — Medication 975 MILLIGRAM(S): at 15:17

## 2024-06-01 RX ADMIN — OXYCODONE HYDROCHLORIDE 10 MILLIGRAM(S): 5 TABLET ORAL at 12:23

## 2024-06-01 RX ADMIN — OXYCODONE HYDROCHLORIDE 10 MILLIGRAM(S): 5 TABLET ORAL at 19:33

## 2024-06-01 RX ADMIN — LIDOCAINE 1 PATCH: 4 CREAM TOPICAL at 19:32

## 2024-06-01 RX ADMIN — VALSARTAN 240 MILLIGRAM(S): 80 TABLET ORAL at 01:18

## 2024-06-01 RX ADMIN — LIDOCAINE 1 PATCH: 4 CREAM TOPICAL at 00:30

## 2024-06-01 RX ADMIN — Medication 5 MILLIGRAM(S): at 13:48

## 2024-06-01 RX ADMIN — LIDOCAINE 1 PATCH: 4 CREAM TOPICAL at 12:22

## 2024-06-01 RX ADMIN — Medication 10 MILLIGRAM(S): at 07:33

## 2024-06-01 RX ADMIN — OXYCODONE HYDROCHLORIDE 10 MILLIGRAM(S): 5 TABLET ORAL at 06:40

## 2024-06-01 NOTE — PROGRESS NOTE ADULT - NUTRITIONAL ASSESSMENT
This patient has been assessed with a concern for Malnutrition and has been determined to have a diagnosis/diagnoses of Underweight (BMI < 19).    This patient is being managed with:   Diet Regular-  Entered: May 29 2024  4:24PM

## 2024-06-01 NOTE — PROGRESS NOTE ADULT - ASSESSMENT
Assessment   72 y/o F, never smoker, with a PMHx of HTN, HLD, PAD, b/l carotid stenosis (s/p L CEA with roof patch on 10/2019, s/p R CEA on 4/24/2021), hx of cancer melanoma 2x: 10+years ago, referred by Dr. Kaelyn Harris for neuroendocrine tumor, confirmed through robotic assisted bronchoscopy, ebus, tbna on 04/19 with Dr. Garcia. Patient now s/p L VATS RA Lingulectomy MLND (5/29/24), arrived to PACU with 1 CT in place, on suction, no air leak. POD1, patient on waterseal in AM w/ noted airleak just prior to pulling CT, so CT left in place, placed to wall suction w/ repeat CXR obtained. POD2 her CT was placed to waterseal, and her valsartan  was restarted. Now POD3, her AM CXR showed a worsened apical PTX and her CT was placed back to suction. She is going to undergo a blood patch pleurodesis today, her CT will be placed to waterseal in the immediate 4 hours after and then to suction overnight. She was hypertensive to the 190s this morning with symptomatic dizziness, and was given hydralizine 10mg IV x2 with resolution. She became hypertensive to the 190s again in the afternoon prior to pleurodesis, states she feels extremely anxious, given .25mg xanax x 1.       Plan:    Neurovascular:   -Pain well controlled with current regimen. PRN's: continue tylenol and oxy, lidocaine patch  - xanax .25 x1 given today for anxiety prior to pleurodesis  - b/l carotid stenosis (s/p L CEA with roof patch on 10/2019, s/p R CEA on 4/24/2021)    HEENT  - continue home latanoprost, rhopressa and talfuprost for glaucoma     Cardiovascular:   -Hemodynamically stable.   -Monitor: BP, HR, tele  - Hx of HTN continue home valsartan      - hypertesnive to the 190s today, given hydral 10mg IV x2 with resolution    Respiratory:   - POD 3 from L VATS RA Lingulectomy, MLND       - CT in place with air leak this morning, worsened PTX, placed back to suction, planned to undergo a blood patch pleurodesis today      - continue home trelegy  -Oxygenating well on room air  -Encourage continued use of IS 10x/hr and frequent ambulation  -CXR: worsened L PTX on waterseal CXR this morning    GI:  -GI PPX: protonix   -PO Diet  -Bowel Regimen: miralax/senna     Renal / :  -Continue to monitor renal function: BUN/Cr stable from 5/30  - hx of neruogenic bladder - straight caths herself q4h at home and has been continuing here   -Monitor I/O's daily     Endocrine:    -No hx of DM or thyroid dx  -A1c: 6.1  -TSH: 3.8    Hematologic:  -CBC: H/H- stable from 5/30, no acute signs of bleeding   -Coagulation Panel.    ID:  -Temperature: afebrile  -CBC: WBC- stable   -Continue to observe for SIRS/Sepsis Syndrome.    Prophylaxis:  -DVT prophylaxis with lovenox  -Continue with SCD's b/l while patient is at rest     Disposition:  -CT management

## 2024-06-01 NOTE — CHART NOTE - NSCHARTNOTEFT_GEN_A_CORE
Bedside blood patch pleurodesis performed for persistent intermittent air leak with a worsened apical PTX on CXR this morning. Thrombin 50,000U/50mL powder was reconstituted in 5mL of sterile water. 1U of cryoprecipitate was ordered and thawed out. His CT was clamped and raised. 30cc of cryo was injected followed by 30cc of reconstituted thrombin and then flushed with 5mL of sterile water. The CT was placed to waterseal. The CT will be placed to suction in 4 hours and a CXR will be repeated in the morning.

## 2024-06-01 NOTE — PROGRESS NOTE ADULT - SUBJECTIVE AND OBJECTIVE BOX
Patient discussed on morning rounds with Dr. Olmedo    OPERATION & DATE: 5/29/24 L VATS RA Lingulectomy, MLND    SUBJECTIVE ASSESSMENT: Patient seen and examined at bedside this morning. She was hypertensive to the 190s this morning and reported feeling dizzy. She denies any CP, SOB, [place], N/V/D at this time. She endorses anxiety surrounding the persistent air leak in her chest tube.    VITAL SIGNS:  Vital Signs Last 24 Hrs  T(C): 36.8 (01 Jun 2024 11:50), Max: 36.9 (01 Jun 2024 09:23)  T(F): 98.3 (01 Jun 2024 11:50), Max: 98.4 (01 Jun 2024 09:23)  HR: 66 (01 Jun 2024 05:41) (64 - 78)  BP: 156/68 (01 Jun 2024 09:23) (130/58 - 206/95)  BP(mean): 98 (01 Jun 2024 09:23) (84 - 136)  RR: 18 (01 Jun 2024 09:23) (17 - 19)  SpO2: 93% (01 Jun 2024 09:23) (93% - 97%)    Parameters below as of 01 Jun 2024 09:23  Patient On (Oxygen Delivery Method): room air      I&O's Detail    31 May 2024 07:01  -  01 Jun 2024 07:00  --------------------------------------------------------  IN:    Oral Fluid: 1760 mL  Total IN: 1760 mL    OUT:    Chest Tube (mL): 50 mL  Total OUT: 50 mL    Total NET: 1710 mL      01 Jun 2024 07:01  -  01 Jun 2024 13:07  --------------------------------------------------------  IN:  Total IN: 0 mL    OUT:    Chest Tube (mL): 20 mL  Total OUT: 20 mL    Total NET: -20 mL        CHEST TUBE:  1 LP CT    PHYSICAL EXAM:  General: Patient lying comfortably in bed, no acute distress     Neurological: Alert and oriented. No focal neurological deficits     Cardiovascular: S1S2, RRR, no murmurs appreciated on exam     Respiratory: Clear to ausculation bilaterally, no wheeze/rhonchi/rales    Gastrointestinal: + BS, soft, non tender, non distended     Extremities: Warm and well perfused. No edema, no calf tenderness     Vascular: 2+ Peripheral pulses b/l     Incision Sites: L VATS sites clean and intact with steri-strips applied. LP CT with occlusive dressing applied     LABS:              MEDICATIONS  (STANDING):  acetaminophen     Tablet .. 975 milliGRAM(s) Oral every 6 hours  atorvastatin 40 milliGRAM(s) Oral at bedtime  enoxaparin Injectable 40 milliGRAM(s) SubCutaneous every 24 hours  fluticasone furoate/umeclidinium/vilanterol 100-62.5-25 MICROgram(s) Inhaler 1 Puff(s) Inhalation daily  hydrALAZINE Injectable 5 milliGRAM(s) IV Push once  lactated ringers. 1000 milliLiter(s) (50 mL/Hr) IV Continuous <Continuous>  latanoprost 0.005% Ophthalmic Solution 1 Drop(s) Both EYES at bedtime  lidocaine   4% Patch 1 Patch Transdermal daily  pantoprazole    Tablet 40 milliGRAM(s) Oral before breakfast  polyethylene glycol 3350 17 Gram(s) Oral daily  Rhopressa 0.02% eye drops 1 Drop(s) 1 Drop(s) Both EYES at bedtime  senna 2 Tablet(s) Oral at bedtime  Talfuprost 0.0015% Eye Drops 1 Drop(s) 1 Drop(s) Both EYES at bedtime  Thrombin 50,000 units/50mL Powder 1 Application(s) 1 Application(s) Topical once  valsartan 240 milliGRAM(s) Oral daily    MEDICATIONS  (PRN):  oxyCODONE    IR 5 milliGRAM(s) Oral every 6 hours PRN Moderate Pain (4 - 6)  oxyCODONE    IR 10 milliGRAM(s) Oral every 6 hours PRN Severe Pain (7 - 10)

## 2024-06-02 LAB
ANION GAP SERPL CALC-SCNC: 12 MMOL/L — SIGNIFICANT CHANGE UP (ref 5–17)
BUN SERPL-MCNC: 23 MG/DL — SIGNIFICANT CHANGE UP (ref 7–23)
CALCIUM SERPL-MCNC: 10.1 MG/DL — SIGNIFICANT CHANGE UP (ref 8.4–10.5)
CHLORIDE SERPL-SCNC: 100 MMOL/L — SIGNIFICANT CHANGE UP (ref 96–108)
CO2 SERPL-SCNC: 24 MMOL/L — SIGNIFICANT CHANGE UP (ref 22–31)
CREAT SERPL-MCNC: 1.05 MG/DL — SIGNIFICANT CHANGE UP (ref 0.5–1.3)
EGFR: 56 ML/MIN/1.73M2 — LOW
GLUCOSE SERPL-MCNC: 98 MG/DL — SIGNIFICANT CHANGE UP (ref 70–99)
HCT VFR BLD CALC: 36.9 % — SIGNIFICANT CHANGE UP (ref 34.5–45)
HGB BLD-MCNC: 12 G/DL — SIGNIFICANT CHANGE UP (ref 11.5–15.5)
MAGNESIUM SERPL-MCNC: 2.3 MG/DL — SIGNIFICANT CHANGE UP (ref 1.6–2.6)
MCHC RBC-ENTMCNC: 31.9 PG — SIGNIFICANT CHANGE UP (ref 27–34)
MCHC RBC-ENTMCNC: 32.5 GM/DL — SIGNIFICANT CHANGE UP (ref 32–36)
MCV RBC AUTO: 98.1 FL — SIGNIFICANT CHANGE UP (ref 80–100)
NRBC # BLD: 0 /100 WBCS — SIGNIFICANT CHANGE UP (ref 0–0)
PLATELET # BLD AUTO: 290 K/UL — SIGNIFICANT CHANGE UP (ref 150–400)
POTASSIUM SERPL-MCNC: 5 MMOL/L — SIGNIFICANT CHANGE UP (ref 3.5–5.3)
POTASSIUM SERPL-SCNC: 5 MMOL/L — SIGNIFICANT CHANGE UP (ref 3.5–5.3)
RBC # BLD: 3.76 M/UL — LOW (ref 3.8–5.2)
RBC # FLD: 15 % — HIGH (ref 10.3–14.5)
SODIUM SERPL-SCNC: 136 MMOL/L — SIGNIFICANT CHANGE UP (ref 135–145)
WBC # BLD: 9.63 K/UL — SIGNIFICANT CHANGE UP (ref 3.8–10.5)
WBC # FLD AUTO: 9.63 K/UL — SIGNIFICANT CHANGE UP (ref 3.8–10.5)

## 2024-06-02 PROCEDURE — 71045 X-RAY EXAM CHEST 1 VIEW: CPT | Mod: 26

## 2024-06-02 RX ADMIN — VALSARTAN 240 MILLIGRAM(S): 80 TABLET ORAL at 05:50

## 2024-06-02 RX ADMIN — Medication 975 MILLIGRAM(S): at 12:09

## 2024-06-02 RX ADMIN — FLUTICASONE FUROATE, UMECLIDINIUM BROMIDE AND VILANTEROL TRIFENATATE 1 PUFF(S): 200; 62.5; 25 POWDER RESPIRATORY (INHALATION) at 11:21

## 2024-06-02 RX ADMIN — LIDOCAINE 1 PATCH: 4 CREAM TOPICAL at 11:19

## 2024-06-02 RX ADMIN — Medication 975 MILLIGRAM(S): at 13:37

## 2024-06-02 RX ADMIN — ATORVASTATIN CALCIUM 40 MILLIGRAM(S): 80 TABLET, FILM COATED ORAL at 21:19

## 2024-06-02 RX ADMIN — Medication 975 MILLIGRAM(S): at 18:21

## 2024-06-02 RX ADMIN — Medication 975 MILLIGRAM(S): at 18:35

## 2024-06-02 RX ADMIN — ENOXAPARIN SODIUM 40 MILLIGRAM(S): 100 INJECTION SUBCUTANEOUS at 17:17

## 2024-06-02 RX ADMIN — LIDOCAINE 1 PATCH: 4 CREAM TOPICAL at 18:35

## 2024-06-02 RX ADMIN — Medication 975 MILLIGRAM(S): at 23:49

## 2024-06-02 RX ADMIN — LIDOCAINE 1 PATCH: 4 CREAM TOPICAL at 23:49

## 2024-06-02 RX ADMIN — Medication 975 MILLIGRAM(S): at 23:15

## 2024-06-02 RX ADMIN — Medication 975 MILLIGRAM(S): at 05:49

## 2024-06-02 RX ADMIN — PANTOPRAZOLE SODIUM 40 MILLIGRAM(S): 20 TABLET, DELAYED RELEASE ORAL at 05:49

## 2024-06-02 NOTE — PROGRESS NOTE ADULT - SUBJECTIVE AND OBJECTIVE BOX
Patient discussed on morning rounds with Dr. Olmedo    OPERATION & DATE: L VATS RA Lingulectomy MLND    SUBJECTIVE ASSESSMENT: Patient seen and examined at bedside this morning. She endorses some pain with deep breathing. She is pulling her IS to 1000cc. She denies any SOB, [place], N/V/D at this time.    VITAL SIGNS:  Vital Signs Last 24 Hrs  T(C): 36.5 (02 Jun 2024 09:27), Max: 37.2 (01 Jun 2024 20:54)  T(F): 97.7 (02 Jun 2024 09:27), Max: 99 (01 Jun 2024 20:54)  HR: 86 (02 Jun 2024 08:40) (64 - 94)  BP: 156/72 (02 Jun 2024 08:40) (98/53 - 216/87)  BP(mean): 103 (02 Jun 2024 08:40) (71 - 127)  RR: 18 (02 Jun 2024 08:40) (17 - 18)  SpO2: 97% (02 Jun 2024 08:40) (94% - 97%)    Parameters below as of 02 Jun 2024 08:40  Patient On (Oxygen Delivery Method): room air      I&O's Detail    01 Jun 2024 07:01  -  02 Jun 2024 07:00  --------------------------------------------------------  IN:    Oral Fluid: 480 mL  Total IN: 480 mL    OUT:    Chest Tube (mL): 120 mL    Stool (mL): 3 mL    Voided (mL): 1 mL  Total OUT: 124 mL    Total NET: 356 mL      02 Jun 2024 07:01  -  02 Jun 2024 09:46  --------------------------------------------------------  IN:    Oral Fluid: 240 mL  Total IN: 240 mL    OUT:    Chest Tube (mL): 10 mL  Total OUT: 10 mL    Total NET: 230 mL        CHEST TUBE:   1 CT to suction     PHYSICAL EXAM:  General: Patient lying comfortably in bed, no acute distress     Neurological: Alert and oriented. No focal neurological deficits     Cardiovascular: S1S2, RRR, no murmurs appreciated on exam     Respiratory: Clear to ausculation bilaterally, no wheeze/rhonchi/rales    Gastrointestinal: + BS, soft, non tender, non distended     Extremities: Warm and well perfused. No edema, no calf tenderness     Vascular: 2+ Peripheral pulses b/l     Incision Sites: L VATS sites with steri-strips applied. LP CT with occlusive dressing applied.    LABS:                        12.0   9.63  )-----------( 290      ( 02 Jun 2024 05:30 )             36.9       06-02    136  |  100  |  23  ----------------------------<  98  5.0   |  24  |  1.05    Ca    10.1      02 Jun 2024 05:30  Mg     2.3     06-02      Urinalysis Basic - ( 02 Jun 2024 05:30 )    Color: x / Appearance: x / SG: x / pH: x  Gluc: 98 mg/dL / Ketone: x  / Bili: x / Urobili: x   Blood: x / Protein: x / Nitrite: x   Leuk Esterase: x / RBC: x / WBC x   Sq Epi: x / Non Sq Epi: x / Bacteria: x      MEDICATIONS  (STANDING):  acetaminophen     Tablet .. 975 milliGRAM(s) Oral every 6 hours  atorvastatin 40 milliGRAM(s) Oral at bedtime  enoxaparin Injectable 40 milliGRAM(s) SubCutaneous every 24 hours  fluticasone furoate/umeclidinium/vilanterol 100-62.5-25 MICROgram(s) Inhaler 1 Puff(s) Inhalation daily  lactated ringers. 1000 milliLiter(s) (50 mL/Hr) IV Continuous <Continuous>  latanoprost 0.005% Ophthalmic Solution 1 Drop(s) Both EYES at bedtime  lidocaine   4% Patch 1 Patch Transdermal daily  pantoprazole    Tablet 40 milliGRAM(s) Oral before breakfast  polyethylene glycol 3350 17 Gram(s) Oral daily  Rhopressa 0.02% eye drops 1 Drop(s) 1 Drop(s) Both EYES at bedtime  senna 2 Tablet(s) Oral at bedtime  Talfuprost 0.0015% Eye Drops 1 Drop(s) 1 Drop(s) Both EYES at bedtime  valsartan 240 milliGRAM(s) Oral daily    MEDICATIONS  (PRN):  oxyCODONE    IR 5 milliGRAM(s) Oral every 6 hours PRN Moderate Pain (4 - 6)  oxyCODONE    IR 10 milliGRAM(s) Oral every 6 hours PRN Severe Pain (7 - 10)    RADIOLOGY & ADDITIONAL TESTS:    < from: Xray Chest 1 View- PORTABLE-Routine (Xray Chest 1 View- PORTABLE-Routine in AM.) (06.02.24 @ 05:44) >  IMPRESSION: No interval change lung infiltrates. Left apical pneumothorax   cannot be excluded    < end of copied text >

## 2024-06-02 NOTE — PROGRESS NOTE ADULT - ASSESSMENT
Assessment   74 y/o F, never smoker, with a PMHx of HTN, HLD, PAD, b/l carotid stenosis (s/p L CEA with roof patch on 10/2019, s/p R CEA on 4/24/2021), hx of cancer melanoma 2x: 10+years ago, referred by Dr. Kaelyn Harris for neuroendocrine tumor, confirmed through robotic assisted bronchoscopy, ebus, tbna on 04/19 with Dr. Garcia. Patient now s/p L VATS RA Lingulectomy MLND (5/29/24), arrived to PACU with 1 CT in place, on suction, no air leak. POD1, patient on waterseal in AM w/ noted airleak just prior to pulling CT, so CT left in place, placed to wall suction w/ repeat CXR obtained. POD2 her CT was placed to waterseal, and her valsartan  was restarted. POD3, her AM waterseal CXR showed a worsened apical PTX. She underwent a blood patch pleurodesis and her CT was placed back to suction. She was hypertensive to the 190s with symptomatic dizziness, and was given hydralizine 10mg IV x2 and .25mg xanax x 1 for anxiety with resolution of her BP and dizziness. POD3 her L apical PTX is improved on CXR, plan to keep her CT to suction today.      Plan:    Neurovascular:   -Pain well controlled with current regimen. PRN's: continue tylenol and oxy, lidocaine patch  - xanax .25 x1 given today for anxiety prior to pleurodesis  - b/l carotid stenosis (s/p L CEA with roof patch on 10/2019, s/p R CEA on 4/24/2021)    HEENT  - continue home latanoprost, rhopressa and talfuprost for glaucoma     Cardiovascular:   -Hemodynamically stable.   -Monitor: BP, HR, tele  - Hx of HTN continue home valsartan      - hypertesnive to the 190s yesterday, given hydral 10mg IV x2 with resolution    Respiratory:   - POD 3 from L VATS RA Lingulectomy, MLND       - CT to suction without evidence of leak this morning, blood patch pleurodesis yesterday, continue CT to suction today      - continue home trelegy  -Oxygenating well on room air  -Encourage continued use of IS 10x/hr and frequent ambulation  -CXR: improved L apical PTX with CT to suction    GI:  -GI PPX: protonix   -PO Diet  -Bowel Regimen: miralax/senna     Renal / :  -Continue to monitor renal function: BUN/Cr 23/1.05  - hx of neruogenic bladder - straight caths herself q4h at home and has been continuing here   -Monitor I/O's daily     Endocrine:    -No hx of DM or thyroid dx  -A1c: 6.1  -TSH: 3.8    Hematologic:  -CBC: H/H- stable from 5/30, no acute signs of bleeding   -Coagulation Panel.    ID:  -Temperature: afebrile  -CBC: WBC- stable   -Continue to observe for SIRS/Sepsis Syndrome.    Prophylaxis:  -DVT prophylaxis with lovenox  -Continue with SCD's b/l while patient is at rest     Disposition:  -CT management    Assessment   72 y/o F, never smoker, with a PMHx of HTN, HLD, PAD, b/l carotid stenosis (s/p L CEA with roof patch on 10/2019, s/p R CEA on 4/24/2021), hx of cancer melanoma 2x: 10+years ago, referred by Dr. Kaelyn Harris for neuroendocrine tumor, confirmed through robotic assisted bronchoscopy, ebus, tbna on 04/19 with Dr. Garcia. Patient now s/p L VATS RA Lingulectomy MLND (5/29/24), arrived to PACU with 1 CT in place, on suction, no air leak. POD1, patient on waterseal in AM w/ noted airleak just prior to pulling CT, so CT left in place, placed to wall suction w/ repeat CXR obtained. POD2 her CT was placed to waterseal, and her valsartan  was restarted. POD3, her AM waterseal CXR showed a worsened apical PTX. She underwent a blood patch pleurodesis and her CT was placed back to suction. She was hypertensive to the 190s with symptomatic dizziness, and was given hydralizine 10mg IV x2 and .25mg xanax x 1 for anxiety with resolution of her BP and dizziness. POD3 her L apical PTX is improved on CXR, plan to keep her CT to suction today.      Plan:    Neurovascular:   -Pain well controlled with current regimen. PRN's: continue tylenol and oxy, lidocaine patch  - xanax .25 x1 given today for anxiety prior to pleurodesis  - b/l carotid stenosis (s/p L CEA with roof patch on 10/2019, s/p R CEA on 4/24/2021)    HEENT  - continue home latanoprost, rhopressa and talfuprost for glaucoma     Cardiovascular:   -Hemodynamically stable.   -Monitor: BP, HR, tele  - Hx of HTN continue home valsartan      - hypertesnive to the 190s yesterday, given hydral 10mg IV x2 with resolution    Respiratory:   - POD 3 from L VATS RA Lingulectomy, MLND       - CT to suction without evidence of leak this morning, blood patch pleurodesis yesterday, continue CT to suction today      - continue home trelegy  -Oxygenating well on room air  -Encourage continued use of IS 10x/hr and frequent ambulation  -CXR: improved L apical PTX with CT to suction    GI:  -GI PPX: protonix   -PO Diet  -Bowel Regimen: miralax/senna     Renal / :  -Continue to monitor renal function: BUN/Cr 23/1.05  - hx of neruogenic bladder - straight caths herself q4h at home and has been continuing here   -Monitor I/O's daily     Endocrine:    -No hx of DM or thyroid dx  -A1c: 6.1  -TSH: 3.8    Hematologic:  -CBC: H/H- 12/36.9  -Coagulation Panel.    ID:  -Temperature: afebrile  -CBC: WBC- 9.6     Prophylaxis:  -DVT prophylaxis with lovenox  -Continue with SCD's b/l while patient is at rest     Disposition:  -CT management    Assessment   74 y/o F, never smoker, with a PMHx of HTN, HLD, PAD, b/l carotid stenosis (s/p L CEA with roof patch on 10/2019, s/p R CEA on 4/24/2021), hx of cancer melanoma 2x: 10+years ago, referred by Dr. Kaelyn Harris for neuroendocrine tumor, confirmed through robotic assisted bronchoscopy, ebus, tbna on 04/19 with Dr. Garcia. Patient now s/p L VATS RA Lingulectomy MLND (5/29/24), arrived to PACU with 1 CT in place, on suction, no air leak. POD1, patient on waterseal in AM w/ noted airleak just prior to pulling CT, so CT left in place, placed to wall suction w/ repeat CXR obtained. POD2 her CT was placed to waterseal, and her valsartan  was restarted. POD3, her AM waterseal CXR showed a worsened apical PTX. She underwent a blood patch pleurodesis and her CT was placed back to suction. She was hypertensive to the 190s with symptomatic dizziness, and was given hydralizine 10mg IV x2 and .25mg xanax x 1 for anxiety with resolution of her BP and dizziness. POD3 her L apical PTX is improved on CXR, plan to keep her CT to suction today.      Plan:    Neurovascular:   -Pain well controlled with current regimen. PRN's: continue tylenol and oxy, lidocaine patch  - xanax .25 x1 given today for anxiety prior to pleurodesis  - b/l carotid stenosis (s/p L CEA with roof patch on 10/2019, s/p R CEA on 4/24/2021)    HEENT  - continue home latanoprost, rhopressa and talfuprost for glaucoma     Cardiovascular:   -Hemodynamically stable.   -Monitor: BP, HR, tele  - Hx of HTN continue home valsartan      - hypertesnive to the 190s yesterday, given hydral 10mg IV x2 with resolution    Respiratory:   - POD 3 from L VATS RA Lingulectomy, MLND       - CT to suction without evidence of leak this morning, blood patch pleurodesis yesterday, continue CT to suction today and waterseal at midnight      - continue home trelegy  -Oxygenating well on room air  -Encourage continued use of IS 10x/hr and frequent ambulation  -CXR: improved L apical PTX with CT to suction    GI:  -GI PPX: protonix   -PO Diet  -Bowel Regimen: miralax/senna     Renal / :  -Continue to monitor renal function: BUN/Cr 23/1.05  - hx of neruogenic bladder - straight caths herself q4h at home and has been continuing here   -Monitor I/O's daily     Endocrine:    -No hx of DM or thyroid dx  -A1c: 6.1  -TSH: 3.8    Hematologic:  -CBC: H/H- 12/36.9  -Coagulation Panel.    ID:  -Temperature: afebrile  -CBC: WBC- 9.6     Prophylaxis:  -DVT prophylaxis with lovenox  -Continue with SCD's b/l while patient is at rest     Disposition:  -CT management

## 2024-06-03 PROBLEM — J30.2 OTHER SEASONAL ALLERGIC RHINITIS: Chronic | Status: ACTIVE | Noted: 2024-05-28

## 2024-06-03 PROCEDURE — 71045 X-RAY EXAM CHEST 1 VIEW: CPT | Mod: 26,77

## 2024-06-03 PROCEDURE — 71045 X-RAY EXAM CHEST 1 VIEW: CPT | Mod: 26

## 2024-06-03 RX ORDER — VALSARTAN 80 MG/1
320 TABLET ORAL DAILY
Refills: 0 | Status: DISCONTINUED | OUTPATIENT
Start: 2024-06-04 | End: 2024-06-06

## 2024-06-03 RX ADMIN — Medication 975 MILLIGRAM(S): at 19:10

## 2024-06-03 RX ADMIN — Medication 975 MILLIGRAM(S): at 07:26

## 2024-06-03 RX ADMIN — Medication 975 MILLIGRAM(S): at 12:31

## 2024-06-03 RX ADMIN — PANTOPRAZOLE SODIUM 40 MILLIGRAM(S): 20 TABLET, DELAYED RELEASE ORAL at 07:10

## 2024-06-03 RX ADMIN — Medication 975 MILLIGRAM(S): at 13:12

## 2024-06-03 RX ADMIN — Medication 975 MILLIGRAM(S): at 20:10

## 2024-06-03 RX ADMIN — SENNA PLUS 2 TABLET(S): 8.6 TABLET ORAL at 21:23

## 2024-06-03 RX ADMIN — LIDOCAINE 1 PATCH: 4 CREAM TOPICAL at 19:09

## 2024-06-03 RX ADMIN — ENOXAPARIN SODIUM 40 MILLIGRAM(S): 100 INJECTION SUBCUTANEOUS at 19:10

## 2024-06-03 RX ADMIN — Medication 975 MILLIGRAM(S): at 07:10

## 2024-06-03 RX ADMIN — VALSARTAN 240 MILLIGRAM(S): 80 TABLET ORAL at 07:10

## 2024-06-03 RX ADMIN — LIDOCAINE 1 PATCH: 4 CREAM TOPICAL at 12:31

## 2024-06-03 RX ADMIN — ATORVASTATIN CALCIUM 40 MILLIGRAM(S): 80 TABLET, FILM COATED ORAL at 21:22

## 2024-06-03 NOTE — PROGRESS NOTE ADULT - SUBJECTIVE AND OBJECTIVE BOX
Thank you for choosing [facility].  You may be receiving an email and/or telephone survey request from [facility] Customer Experience regarding your visit today.  Please take a few minutes to respond to the survey to let us know how we are doing.      If you have questions or concerns, please contact us via [employer] or you can contact your care team at [phone].    Our Clinic hours are:  Monday 6:40 am  to 7:00 pm  Tuesday -Friday 6:40 am to 5:00 pm    The [place] outpatient lab hours are:  Monday - Friday 6:10 am to 4:45 pm  Saturdays 7:00 am to 11:00 am  Appointments are required, call [phone]    If you have clinical questions after hours or would like to schedule an appointment,  call the clinic at [phone].    (R53.83) Other fatigue  (primary encounter diagnosis)  Comment:   Plan: Echocardiogram Exercise Stress        We discussed some of the possible causes of fatigue, and reviewed the labs done in September at the outside clinic.   These did not show any  Cause of the fatigue. Call [phone] for Cardiology, or just go there, to schedule the echo stress test.   Avoid heavy exertion until this test. Take one 325 mg Asprin daily. Stop this if the heart tests are normal. See below. We will notify of the results  And recommendations.     (R06.00) Dyspnea, unspecified type  Comment:   Plan: Echocardiogram Exercise Stress, General PFT Lab        (Please always keep checked), Pulmonary         Function Test        See above. Make the appt for the lung test at [phone] to do after the heart test, and do this. We will call the results.   If they are both normal then recheck in clinic.      Patient discussed on morning rounds with       Operation / Date:     SUBJECTIVE ASSESSMENT:  73y Female         Vital Signs Last 24 Hrs  T(C): 36.8 (03 Jun 2024 06:00), Max: 37.3 (02 Jun 2024 17:41)  T(F): 98.3 (03 Jun 2024 06:00), Max: 99.1 (02 Jun 2024 17:41)  HR: 67 (03 Jun 2024 07:00) (66 - 90)  BP: 157/73 (03 Jun 2024 07:00) (103/55 - 157/73)  BP(mean): 105 (03 Jun 2024 07:00) (75 - 105)  RR: 16 (03 Jun 2024 07:00) (16 - 18)  SpO2: 99% (03 Jun 2024 07:00) (97% - 99%)    Parameters below as of 03 Jun 2024 07:00  Patient On (Oxygen Delivery Method): room air      I&O's Detail    02 Jun 2024 07:01  -  03 Jun 2024 07:00  --------------------------------------------------------  IN:    Oral Fluid: 240 mL  Total IN: 240 mL    OUT:    Chest Tube (mL): 60 mL  Total OUT: 60 mL    Total NET: 180 mL          CHEST TUBE:  Yes/No. AIR LEAKS: Yes/No. Suction / H2O SEAL.   LOREN DRAIN:  Yes/No.  EPICARDIAL WIRES: Yes/No.  TIE DOWNS: Yes/No.  WEAVER: Yes/No.    PHYSICAL EXAM:    General:     Neurological:    Cardiovascular:    Respiratory:    Gastrointestinal:    Extremities:    Vascular:    Incision Sites:    LABS:                        12.0   9.63  )-----------( 290      ( 02 Jun 2024 05:30 )             36.9       COUMADIN:  Yes/No. REASON: .        06-02    136  |  100  |  23  ----------------------------<  98  5.0   |  24  |  1.05    Ca    10.1      02 Jun 2024 05:30  Mg     2.3     06-02        Urinalysis Basic - ( 02 Jun 2024 05:30 )    Color: x / Appearance: x / SG: x / pH: x  Gluc: 98 mg/dL / Ketone: x  / Bili: x / Urobili: x   Blood: x / Protein: x / Nitrite: x   Leuk Esterase: x / RBC: x / WBC x   Sq Epi: x / Non Sq Epi: x / Bacteria: x        MEDICATIONS  (STANDING):  acetaminophen     Tablet .. 975 milliGRAM(s) Oral every 6 hours  atorvastatin 40 milliGRAM(s) Oral at bedtime  enoxaparin Injectable 40 milliGRAM(s) SubCutaneous every 24 hours  fluticasone furoate/umeclidinium/vilanterol 100-62.5-25 MICROgram(s) Inhaler 1 Puff(s) Inhalation daily  lactated ringers. 1000 milliLiter(s) (50 mL/Hr) IV Continuous <Continuous>  lidocaine   4% Patch 1 Patch Transdermal daily  pantoprazole    Tablet 40 milliGRAM(s) Oral before breakfast  polyethylene glycol 3350 17 Gram(s) Oral daily  Rhopressa 0.02% eye drops 1 Drop(s) 1 Drop(s) Both EYES at bedtime  senna 2 Tablet(s) Oral at bedtime  Talfuprost 0.0015% Eye Drops 1 Drop(s) 1 Drop(s) Both EYES at bedtime  valsartan 240 milliGRAM(s) Oral daily    MEDICATIONS  (PRN):  oxyCODONE    IR 5 milliGRAM(s) Oral every 6 hours PRN Moderate Pain (4 - 6)  oxyCODONE    IR 10 milliGRAM(s) Oral every 6 hours PRN Severe Pain (7 - 10)        RADIOLOGY & ADDITIONAL TESTS:     Patient discussed on morning rounds with Dr. Rader    Operation / Date: 5/29/24 Left VATS robotic assisted Lingulectomy MLND   HYUN block      SUBJECTIVE ASSESSMENT:  Patient feels well overall.  She states she's been coughing a lot since her CT was taken off suction at midnight.  Otherwise denies SOB, chest pain or any other symptoms.  Denies CP/SOB/N/V/D/dizziness/cough/fever/chills.  Walking and eating w/o issues.      Vital Signs Last 24 Hrs  T(C): 36.8 (03 Jun 2024 06:00), Max: 37.3 (02 Jun 2024 17:41)  T(F): 98.3 (03 Jun 2024 06:00), Max: 99.1 (02 Jun 2024 17:41)  HR: 67 (03 Jun 2024 07:00) (66 - 90)  BP: 157/73 (03 Jun 2024 07:00) (103/55 - 157/73)  BP(mean): 105 (03 Jun 2024 07:00) (75 - 105)  RR: 16 (03 Jun 2024 07:00) (16 - 18)  SpO2: 99% (03 Jun 2024 07:00) (97% - 99%)    Parameters below as of 03 Jun 2024 07:00  Patient On (Oxygen Delivery Method): room air      I&O's Detail    02 Jun 2024 07:01  -  03 Jun 2024 07:00  --------------------------------------------------------  IN:    Oral Fluid: 240 mL  Total IN: 240 mL    OUT:    Chest Tube (mL): 60 mL  Total OUT: 60 mL    Total NET: 180 mL      CHEST TUBE:  Yes.  Air leak: Yes, with valsalva.       PHYSICAL EXAM:    GEN: NAD, looks comfortable  Psych: Mood appropriate  Neuro: A&Ox3.  No focal deficits.  Moving all extremities.   HEENT: No obvious abnormalities  CV: S1S2, regular, no murmurs appreciated.  No carotid bruits.  No JVD  Lungs: Clear B/L.  No wheezing, rales or rhonchi  ABD: Soft, non-tender, non-distended.  +Bowel sounds  EXT: Warm and well perfused.  No peripheral edema noted  Musculoskeletal: Moving all extremities with normal ROM, no joint swelling  PV: Pedal pulses palpable  Incision Sites: VATS incisions healing well.      LABS:                        12.0   9.63  )-----------( 290      ( 02 Jun 2024 05:30 )             36.9       06-02    136  |  100  |  23  ----------------------------<  98  5.0   |  24  |  1.05    Ca    10.1      02 Jun 2024 05:30  Mg     2.3     06-02        Urinalysis Basic - ( 02 Jun 2024 05:30 )    Color: x / Appearance: x / SG: x / pH: x  Gluc: 98 mg/dL / Ketone: x  / Bili: x / Urobili: x   Blood: x / Protein: x / Nitrite: x   Leuk Esterase: x / RBC: x / WBC x   Sq Epi: x / Non Sq Epi: x / Bacteria: x      MEDICATIONS  (STANDING):  acetaminophen     Tablet .. 975 milliGRAM(s) Oral every 6 hours  atorvastatin 40 milliGRAM(s) Oral at bedtime  enoxaparin Injectable 40 milliGRAM(s) SubCutaneous every 24 hours  fluticasone furoate/umeclidinium/vilanterol 100-62.5-25 MICROgram(s) Inhaler 1 Puff(s) Inhalation daily  lactated ringers. 1000 milliLiter(s) (50 mL/Hr) IV Continuous <Continuous>  lidocaine   4% Patch 1 Patch Transdermal daily  pantoprazole    Tablet 40 milliGRAM(s) Oral before breakfast  polyethylene glycol 3350 17 Gram(s) Oral daily  Rhopressa 0.02% eye drops 1 Drop(s) 1 Drop(s) Both EYES at bedtime  senna 2 Tablet(s) Oral at bedtime  Talfuprost 0.0015% Eye Drops 1 Drop(s) 1 Drop(s) Both EYES at bedtime  valsartan 240 milliGRAM(s) Oral daily    MEDICATIONS  (PRN):  oxyCODONE    IR 5 milliGRAM(s) Oral every 6 hours PRN Moderate Pain (4 - 6)  oxyCODONE    IR 10 milliGRAM(s) Oral every 6 hours PRN Severe Pain (7 - 10)        RADIOLOGY & ADDITIONAL TESTS:

## 2024-06-03 NOTE — PROGRESS NOTE ADULT - ASSESSMENT
74 y/o F, never smoker, with a PMHx of HTN, HLD, PAD, b/l carotid stenosis (s/p L CEA with roof patch on 10/2019, s/p R CEA on 4/24/2021), hx of cancer melanoma 2x: 10+years ago, referred by Dr. Kaelyn Harris for neuroendocrine tumor, confirmed through robotic assisted bronchoscopy, ebus, tbna on 04/19 with Dr. Garcia. Patient now s/p L VATS RA Lingulectomy MLND (5/29/24), arrived to PACU with 1 CT in place, on suction, no air leak. POD1, patient on waterseal in AM w/ noted airleak just prior to pulling CT, so CT left in place, placed to wall suction w/ repeat CXR obtained. POD2 her CT was placed to waterseal, and her valsartan  was restarted. POD3, her AM waterseal CXR showed a worsened apical PTX. She underwent a blood patch pleurodesis and her CT was placed back to suction. She was hypertensive to the 190s with symptomatic dizziness, and was given hydralizine 10mg IV x2 and .25mg xanax x 1 for anxiety with resolution of her BP and dizziness. POD3 her L apical PTX is improved on CXR, plan to keep her CT to suction today.      Plan:    Neurovascular:   -Pain controlled w/ Tylenol and Oxycodone  -h/o CEA.  No new focal deficits    HEENT  -continue home latanoprost, rhopressa and talfuprost for glaucoma     Cardiovascular:   -BP/HR stable.   -Continue statin, Valsartan.     Respiratory:   -CT in place, placed on waterseal at midnight.  Pt now w/ cough.  Seeing an air leak at bedside this am, awaiting plan by attendings for CT removal.  ?Small left apical pneumothorax on CXR at 4am today.   -Continue home trelegy  -Oxygenating well on room air  -Encouraged continued use of IS and ambulation    GI:  -GI PPX: protonix   -PO Diet  -Bowel Regimen: miralax/senna     Renal / :  -BUN/creatinine stable  -hx of neruogenic bladder - straight caths herself q4h at home and has been continuing here   -Monitor I/O's daily     Endocrine:    -No hx of DM or thyroid dx  -A1c: 6.1  -TSH: 3.8    Hematologic:  -CBC: H/H stable.   -Lovenox for DVT prophylaxis.     ID:  -Temperature: afebrile  -CBC: WBC normal.     Prophylaxis:  -DVT prophylaxis with lovenox  -Continue with SCD's b/l while patient is at rest     Disposition:  -CT management

## 2024-06-04 PROCEDURE — 71045 X-RAY EXAM CHEST 1 VIEW: CPT | Mod: 26

## 2024-06-04 RX ORDER — THROMBIN (BOVINE) 10000 UNIT
50000 KIT TOPICAL ONCE
Refills: 0 | Status: COMPLETED | OUTPATIENT
Start: 2024-06-04 | End: 2024-06-04

## 2024-06-04 RX ADMIN — Medication 50000 INTERNATIONAL UNIT(S): at 11:37

## 2024-06-04 RX ADMIN — FLUTICASONE FUROATE, UMECLIDINIUM BROMIDE AND VILANTEROL TRIFENATATE 1 PUFF(S): 200; 62.5; 25 POWDER RESPIRATORY (INHALATION) at 12:33

## 2024-06-04 RX ADMIN — Medication 975 MILLIGRAM(S): at 23:27

## 2024-06-04 RX ADMIN — ATORVASTATIN CALCIUM 40 MILLIGRAM(S): 80 TABLET, FILM COATED ORAL at 21:51

## 2024-06-04 RX ADMIN — SENNA PLUS 2 TABLET(S): 8.6 TABLET ORAL at 21:51

## 2024-06-04 RX ADMIN — VALSARTAN 320 MILLIGRAM(S): 80 TABLET ORAL at 06:11

## 2024-06-04 RX ADMIN — LIDOCAINE 1 PATCH: 4 CREAM TOPICAL at 11:50

## 2024-06-04 RX ADMIN — LIDOCAINE 1 PATCH: 4 CREAM TOPICAL at 23:00

## 2024-06-04 RX ADMIN — Medication 975 MILLIGRAM(S): at 11:51

## 2024-06-04 RX ADMIN — LIDOCAINE 1 PATCH: 4 CREAM TOPICAL at 18:51

## 2024-06-04 RX ADMIN — Medication 975 MILLIGRAM(S): at 06:53

## 2024-06-04 RX ADMIN — Medication 975 MILLIGRAM(S): at 23:11

## 2024-06-04 RX ADMIN — Medication 975 MILLIGRAM(S): at 18:47

## 2024-06-04 RX ADMIN — LIDOCAINE 1 PATCH: 4 CREAM TOPICAL at 00:00

## 2024-06-04 RX ADMIN — Medication 975 MILLIGRAM(S): at 02:31

## 2024-06-04 RX ADMIN — Medication 975 MILLIGRAM(S): at 17:47

## 2024-06-04 RX ADMIN — PANTOPRAZOLE SODIUM 40 MILLIGRAM(S): 20 TABLET, DELAYED RELEASE ORAL at 06:11

## 2024-06-04 RX ADMIN — ENOXAPARIN SODIUM 40 MILLIGRAM(S): 100 INJECTION SUBCUTANEOUS at 17:46

## 2024-06-04 RX ADMIN — Medication 975 MILLIGRAM(S): at 01:31

## 2024-06-04 RX ADMIN — Medication 975 MILLIGRAM(S): at 06:11

## 2024-06-04 RX ADMIN — Medication 975 MILLIGRAM(S): at 12:51

## 2024-06-04 NOTE — PROGRESS NOTE ADULT - ASSESSMENT
72 y/o F, never smoker, with a PMHx of HTN, HLD, PAD, b/l carotid stenosis (s/p L CEA with roof patch on 10/2019, s/p R CEA on 4/24/2021), hx of cancer melanoma 2x: 10+years ago, referred by Dr. Kaelyn Harris for neuroendocrine tumor, confirmed through robotic assisted bronchoscopy, ebus, tbna on 04/19 with Dr. Garcia. Patient now s/p L VATS RA Lingulectomy MLND (5/29/24), arrived to PACU with 1 CT in place, on suction, no air leak. POD1, patient on waterseal in AM w/ noted airleak just prior to pulling CT, so CT left in place, placed to wall suction w/ repeat CXR obtained. POD2 her CT was placed to waterseal, and her valsartan  was restarted. POD3, her AM waterseal CXR showed a worsened apical PTX. She underwent a blood patch pleurodesis and her CT was placed back to suction. She was hypertensive to the 190s with symptomatic dizziness, and was given hydralizine 10mg IV x2 and .25mg xanax x 1 for anxiety with resolution of her BP and dizziness. POD3 her L apical PTX is improved on CXR, plan to keep her CT to suction today. POD 4 patient w/ continued airleak, so repeat blood patch done today, placed on waterseal, now on suction for the afternoon/overnight. Will follow up CXR in the AM.     Plan:    Neurovascular:   -Pain controlled w/ Tylenol  -b/l carotid stenosis (s/p L CEA with roof patch on 10/2019, s/p R CEA on 4/24/2021): no deficits    HEENT  - Glaucoma: continue home latanoprost, rhopressa and talfuprost    Cardiovascular:   -BP/HR stable.   -HTN: Valsartan 320mg daily   -HLD: atorvastatin 40mg nightly     Respiratory:   -S/p L VATS RA Lingulectomy MLND (5/29/24), L CT in place, c/b recurrent airleaks   CXR: stable from prior, L apical pneumo seen   S/p blood patch on 6/4, follow up CXR in AM 6/5   Placed on suction overnight 6/4  -Continue home trelegy  -Oxygenating well on room air  -Encouraged continued use of IS and ambulation    GI:  -GI PPX: protonix   -PO Diet  -Bowel Regimen: miralax/senna     Renal / :  -BUN/creatinine: 23/1.05  -hx of neurogenic bladder - straight caths herself q4h at home and has been continuing here   -Monitor I/O's daily     Endocrine:    -No hx of DM or thyroid dx  -A1c: 6.1  -TSH: 3.8    Hematologic:  -CBC: H/H stable (last drawn 6/2)  -Lovenox for DVT prophylaxis.     ID:  -Temperature: afebrile  -CBC: WBC normal (last obtained 6/2)    Prophylaxis:  -DVT prophylaxis with lovenox  -Continue with SCD's b/l while patient is at rest     Disposition:  -Telemetry

## 2024-06-05 LAB — SURGICAL PATHOLOGY STUDY: SIGNIFICANT CHANGE UP

## 2024-06-05 PROCEDURE — 71045 X-RAY EXAM CHEST 1 VIEW: CPT | Mod: 26,76

## 2024-06-05 RX ADMIN — LIDOCAINE 1 PATCH: 4 CREAM TOPICAL at 22:15

## 2024-06-05 RX ADMIN — Medication 975 MILLIGRAM(S): at 22:13

## 2024-06-05 RX ADMIN — VALSARTAN 320 MILLIGRAM(S): 80 TABLET ORAL at 05:37

## 2024-06-05 RX ADMIN — Medication 975 MILLIGRAM(S): at 23:03

## 2024-06-05 RX ADMIN — ENOXAPARIN SODIUM 40 MILLIGRAM(S): 100 INJECTION SUBCUTANEOUS at 17:21

## 2024-06-05 RX ADMIN — FLUTICASONE FUROATE, UMECLIDINIUM BROMIDE AND VILANTEROL TRIFENATATE 1 PUFF(S): 200; 62.5; 25 POWDER RESPIRATORY (INHALATION) at 11:01

## 2024-06-05 RX ADMIN — PANTOPRAZOLE SODIUM 40 MILLIGRAM(S): 20 TABLET, DELAYED RELEASE ORAL at 06:06

## 2024-06-05 RX ADMIN — LIDOCAINE 1 PATCH: 4 CREAM TOPICAL at 11:22

## 2024-06-05 RX ADMIN — ATORVASTATIN CALCIUM 40 MILLIGRAM(S): 80 TABLET, FILM COATED ORAL at 22:15

## 2024-06-05 RX ADMIN — Medication 975 MILLIGRAM(S): at 11:18

## 2024-06-05 RX ADMIN — SENNA PLUS 2 TABLET(S): 8.6 TABLET ORAL at 22:12

## 2024-06-05 RX ADMIN — Medication 975 MILLIGRAM(S): at 18:21

## 2024-06-05 RX ADMIN — Medication 975 MILLIGRAM(S): at 12:18

## 2024-06-05 RX ADMIN — Medication 975 MILLIGRAM(S): at 06:36

## 2024-06-05 RX ADMIN — Medication 975 MILLIGRAM(S): at 05:36

## 2024-06-05 RX ADMIN — LIDOCAINE 1 PATCH: 4 CREAM TOPICAL at 18:31

## 2024-06-05 RX ADMIN — Medication 975 MILLIGRAM(S): at 17:21

## 2024-06-05 NOTE — PROGRESS NOTE ADULT - PROVIDER SPECIALTY LIST ADULT
CT Surgery
Thoracic Surgery

## 2024-06-05 NOTE — PROGRESS NOTE ADULT - SUBJECTIVE AND OBJECTIVE BOX
Patient discussed on morning rounds with       Operation / Date:     SUBJECTIVE ASSESSMENT:  73y Female         Vital Signs Last 24 Hrs  T(C): 36.1 (05 Jun 2024 09:05), Max: 37.5 (04 Jun 2024 22:19)  T(F): 97 (05 Jun 2024 09:05), Max: 99.5 (04 Jun 2024 22:19)  HR: 78 (05 Jun 2024 08:30) (68 - 86)  BP: 115/57 (05 Jun 2024 08:30) (115/57 - 150/65)  BP(mean): 82 (05 Jun 2024 08:30) (82 - 97)  RR: 18 (05 Jun 2024 08:30) (18 - 20)  SpO2: 95% (05 Jun 2024 08:30) (94% - 98%)    Parameters below as of 05 Jun 2024 08:30  Patient On (Oxygen Delivery Method): room air      I&O's Detail    04 Jun 2024 07:01  -  05 Jun 2024 07:00  --------------------------------------------------------  IN:    Oral Fluid: 540 mL  Total IN: 540 mL    OUT:    Chest Tube (mL): 90 mL    Voided (mL): 1 mL  Total OUT: 91 mL    Total NET: 449 mL    CHEST TUBE:  Yes AIR LEAKS: No. CLAMPED   LOREN DRAIN:  No.  EPICARDIAL WIRES: No.  TIE DOWNS: Yes  WEAVER: No.    PHYSICAL EXAM:  Neuro: A&Ox3.  No focal deficits.  Moving all extremities.   HEENT: No obvious abnormalities  CV: S1S2, regular, no murmurs appreciated.    Lungs: +L lung w/ crackles. R lung CTA no wheezing, rales or rhonchi  ABD: Soft, non-tender, non-distended.  +Bowel sounds  EXT: Warm and well perfused.  No peripheral edema noted  Musculoskeletal: Moving all extremities with normal ROM, no joint swelling  PV: Pedal pulses palpable  Incision Sites: VATS incisions healing well. +L CT in place with dressing c/d/i     LABS:      COUMADIN:  No. REASON: not indicated      MEDICATIONS  (STANDING):  acetaminophen     Tablet .. 975 milliGRAM(s) Oral every 6 hours  atorvastatin 40 milliGRAM(s) Oral at bedtime  enoxaparin Injectable 40 milliGRAM(s) SubCutaneous every 24 hours  fluticasone furoate/umeclidinium/vilanterol 100-62.5-25 MICROgram(s) Inhaler 1 Puff(s) Inhalation daily  lactated ringers. 1000 milliLiter(s) (50 mL/Hr) IV Continuous <Continuous>  lidocaine   4% Patch 1 Patch Transdermal daily  pantoprazole    Tablet 40 milliGRAM(s) Oral before breakfast  polyethylene glycol 3350 17 Gram(s) Oral daily  Rhopressa 0.02% eye drops 1 Drop(s) 1 Drop(s) Both EYES at bedtime  senna 2 Tablet(s) Oral at bedtime  Talfuprost 0.0015% Eye Drops 1 Drop(s) 1 Drop(s) Both EYES at bedtime  valsartan 320 milliGRAM(s) Oral daily    MEDICATIONS  (PRN):  oxyCODONE    IR 5 milliGRAM(s) Oral every 6 hours PRN Moderate Pain (4 - 6)  oxyCODONE    IR 10 milliGRAM(s) Oral every 6 hours PRN Severe Pain (7 - 10)        RADIOLOGY & ADDITIONAL TESTS:     Patient discussed on morning rounds with Dr. Rader     Operation / Date: Bronchoscopy, LVATS, RA, Lingular segmentectomy, MLND, 5/29/24    SUBJECTIVE ASSESSMENT:  73y Female patient without new complaints. Unchanged L chest pain 2/2 CT in place. Controlled with Tylenol. Denies dizziness, sob, palpitations, abd pain, N/V/D, LE swelling or pain. Continues to walk in hallways and motivated to continue to progress.     Vital Signs Last 24 Hrs  T(C): 36.1 (05 Jun 2024 09:05), Max: 37.5 (04 Jun 2024 22:19)  T(F): 97 (05 Jun 2024 09:05), Max: 99.5 (04 Jun 2024 22:19)  HR: 78 (05 Jun 2024 08:30) (68 - 86)  BP: 115/57 (05 Jun 2024 08:30) (115/57 - 150/65)  BP(mean): 82 (05 Jun 2024 08:30) (82 - 97)  RR: 18 (05 Jun 2024 08:30) (18 - 20)  SpO2: 95% (05 Jun 2024 08:30) (94% - 98%)    Parameters below as of 05 Jun 2024 08:30  Patient On (Oxygen Delivery Method): room air      I&O's Detail    04 Jun 2024 07:01  -  05 Jun 2024 07:00  --------------------------------------------------------  IN:    Oral Fluid: 540 mL  Total IN: 540 mL    OUT:    Chest Tube (mL): 90 mL    Voided (mL): 1 mL  Total OUT: 91 mL    Total NET: 449 mL    CHEST TUBE:  Yes AIR LEAKS: No. CLAMPED   LOREN DRAIN:  No.  EPICARDIAL WIRES: No.  TIE DOWNS: Yes  WEAVER: No.    PHYSICAL EXAM:  Neuro: A&Ox3.  No focal deficits.  Moving all extremities.   HEENT: No obvious abnormalities  CV: S1S2, regular, no murmurs appreciated.    Lungs: +L lung w/ crackles. R lung CTA no wheezing, rales or rhonchi  ABD: Soft, non-tender, non-distended.  +Bowel sounds  EXT: Warm and well perfused.  No peripheral edema noted  Musculoskeletal: Moving all extremities with normal ROM, no joint swelling  PV: Pedal pulses palpable  Incision Sites: VATS incisions healing well. +L CT in place with dressing c/d/i     LABS:      COUMADIN:  No. REASON: not indicated      MEDICATIONS  (STANDING):  acetaminophen     Tablet .. 975 milliGRAM(s) Oral every 6 hours  atorvastatin 40 milliGRAM(s) Oral at bedtime  enoxaparin Injectable 40 milliGRAM(s) SubCutaneous every 24 hours  fluticasone furoate/umeclidinium/vilanterol 100-62.5-25 MICROgram(s) Inhaler 1 Puff(s) Inhalation daily  lactated ringers. 1000 milliLiter(s) (50 mL/Hr) IV Continuous <Continuous>  lidocaine   4% Patch 1 Patch Transdermal daily  pantoprazole    Tablet 40 milliGRAM(s) Oral before breakfast  polyethylene glycol 3350 17 Gram(s) Oral daily  Rhopressa 0.02% eye drops 1 Drop(s) 1 Drop(s) Both EYES at bedtime  senna 2 Tablet(s) Oral at bedtime  Talfuprost 0.0015% Eye Drops 1 Drop(s) 1 Drop(s) Both EYES at bedtime  valsartan 320 milliGRAM(s) Oral daily    MEDICATIONS  (PRN):  oxyCODONE    IR 5 milliGRAM(s) Oral every 6 hours PRN Moderate Pain (4 - 6)  oxyCODONE    IR 10 milliGRAM(s) Oral every 6 hours PRN Severe Pain (7 - 10)        RADIOLOGY & ADDITIONAL TESTS:  < from: Xray Chest 1 View- PORTABLE-Routine (Xray Chest 1 View- PORTABLE-Routine in AM.) (06.05.24 @ 06:41) >  impression: Hyperinflation. Left apical pneumothorax and left   subcutaneous emphysema, unchanged. Stable position left chest tube..   Unchanged left hilar opacity, left chain sutures, left lower lobe opacity   and peripheral left midlung field focal atelectasis.. Heart and   mediastinum are unremarkable. Stable bony structures.    < from: Xray Chest 1 View-PORTABLE IMMEDIATE (Xray Chest 1 View-PORTABLE IMMEDIATE .) (06.05.24 @ 08:07) >  impression: Hyperinflation. Left apical pneumothorax and left   subcutaneous emphysema, unchanged. Stable position left chest tube.   Unchanged left hilar opacity, left chain sutures, left lower lobe opacity   and peripheral left midlung field focal atelectasis. Heart and   mediastinum are unremarkable. Stable bony structures

## 2024-06-05 NOTE — PROGRESS NOTE ADULT - ASSESSMENT
74 y/o F, never smoker, with a PMHx of HTN, HLD, PAD, b/l carotid stenosis (s/p L CEA with roof patch on 10/2019, s/p R CEA on 4/24/2021), hx of cancer melanoma 2x: 10+years ago, referred by Dr. Kaelyn Harris for neuroendocrine tumor, confirmed through robotic assisted bronchoscopy, ebus, tbna on 04/19 with Dr. Garcia. Patient now s/p L VATS RA Lingulectomy MLND (5/29/24), arrived to PACU with 1 CT in place, on suction, no air leak. POD1, patient on waterseal in AM w/ noted airleak just prior to pulling CT, so CT left in place, placed to wall suction w/ repeat CXR obtained. POD2 her CT was placed to waterseal, and her valsartan  was restarted. POD3, her AM waterseal CXR showed a worsened apical PTX. She underwent a blood patch pleurodesis and her CT was placed back to suction. She was hypertensive to the 190s with symptomatic dizziness, and was given hydralizine 10mg IV x2 and .25mg xanax x 1 for anxiety with resolution of her BP and dizziness. POD3 her L apical PTX is improved on CXR, plan to keep her CT to suction today. POD 4 patient w/ continued airleak, so repeat blood patch done today, placed on waterseal, now on suction for the afternoon/overnight. POD 5 patient with stable appearing CXR on suction and then clamped, leaving tube clamped until 12pm and will repeat CXR. If Xray is stable, will consider pulling tube.     Plan:    Neurovascular: pain controlled on current regimen.    -Pain controlled w/ Tylenol  -Hx of b/l carotid stenosis (s/p L CEA with roof patch on 10/2019, s/p R CEA on 4/24/2021): no deficits    HEENT  - Glaucoma: continue home latanoprost, rhopressa and talfuprost    Cardiovascular:   -BP/HR stable.   -HTN: Valsartan 320mg daily   -HLD: atorvastatin 40mg nightly     Respiratory:   -S/p L VATS RA Lingulectomy MLND (5/29/24), L CT in place, c/b recurrent airleaks    S/p blood patch on 6/4, follow up CXR in AM 6/5   Placed on suction overnight 6/4. Tube clamped AM 6/5, following up 12PM CXR  No airleaks seen at bedside this AM  -Continue home trelegy  -Oxygenating well on room air  -Encouraged continued use of IS and ambulation    GI:  -GI PPX: protonix   -PO Diet  -Bowel Regimen: miralax/senna     Renal / :  -BUN/creatinine: 23/1.05 (6/2)  -hx of neurogenic bladder - straight caths herself q4h at home and has been continuing here   -Monitor I/O's daily     Endocrine:    -No hx of DM or thyroid dx  -A1c: 6.1  -TSH: 3.8    Hematologic:  -CBC: H/H stable (last drawn 6/2)  -Lovenox for DVT prophylaxis.     ID:  -Temperature: afebrile  -CBC: WBC normal (last obtained 6/2)    Prophylaxis:  -DVT prophylaxis with lovenox  -Continue with SCD's b/l while patient is at rest     Disposition:  -Telemetry

## 2024-06-05 NOTE — PROGRESS NOTE ADULT - REASON FOR ADMISSION
Elective surgery: Bronchoscopy, LVATS, RA, Lingular segmentectomy, MLND

## 2024-06-06 ENCOUNTER — TRANSCRIPTION ENCOUNTER (OUTPATIENT)
Age: 74
End: 2024-06-06

## 2024-06-06 VITALS — TEMPERATURE: 97 F

## 2024-06-06 PROCEDURE — C1889: CPT

## 2024-06-06 PROCEDURE — 88342 IMHCHEM/IMCYTCHM 1ST ANTB: CPT

## 2024-06-06 PROCEDURE — 86901 BLOOD TYPING SEROLOGIC RH(D): CPT

## 2024-06-06 PROCEDURE — 85025 COMPLETE CBC W/AUTO DIFF WBC: CPT

## 2024-06-06 PROCEDURE — 86965 POOLING BLOOD PLATELETS: CPT

## 2024-06-06 PROCEDURE — 71046 X-RAY EXAM CHEST 2 VIEWS: CPT

## 2024-06-06 PROCEDURE — 83735 ASSAY OF MAGNESIUM: CPT

## 2024-06-06 PROCEDURE — 88305 TISSUE EXAM BY PATHOLOGIST: CPT

## 2024-06-06 PROCEDURE — 88309 TISSUE EXAM BY PATHOLOGIST: CPT

## 2024-06-06 PROCEDURE — 80048 BASIC METABOLIC PNL TOTAL CA: CPT

## 2024-06-06 PROCEDURE — 83036 HEMOGLOBIN GLYCOSYLATED A1C: CPT

## 2024-06-06 PROCEDURE — 36415 COLL VENOUS BLD VENIPUNCTURE: CPT

## 2024-06-06 PROCEDURE — 94640 AIRWAY INHALATION TREATMENT: CPT

## 2024-06-06 PROCEDURE — 84443 ASSAY THYROID STIM HORMONE: CPT

## 2024-06-06 PROCEDURE — 88360 TUMOR IMMUNOHISTOCHEM/MANUAL: CPT

## 2024-06-06 PROCEDURE — S2900: CPT

## 2024-06-06 PROCEDURE — 88341 IMHCHEM/IMCYTCHM EA ADD ANTB: CPT

## 2024-06-06 PROCEDURE — 86850 RBC ANTIBODY SCREEN: CPT

## 2024-06-06 PROCEDURE — 86900 BLOOD TYPING SEROLOGIC ABO: CPT

## 2024-06-06 PROCEDURE — 85027 COMPLETE CBC AUTOMATED: CPT

## 2024-06-06 PROCEDURE — 71045 X-RAY EXAM CHEST 1 VIEW: CPT | Mod: 26,59

## 2024-06-06 PROCEDURE — 36430 TRANSFUSION BLD/BLD COMPNT: CPT

## 2024-06-06 PROCEDURE — P9012: CPT

## 2024-06-06 PROCEDURE — 71045 X-RAY EXAM CHEST 1 VIEW: CPT

## 2024-06-06 PROCEDURE — 71046 X-RAY EXAM CHEST 2 VIEWS: CPT | Mod: 26

## 2024-06-06 RX ORDER — PANTOPRAZOLE SODIUM 20 MG/1
1 TABLET, DELAYED RELEASE ORAL
Qty: 30 | Refills: 0
Start: 2024-06-06 | End: 2024-07-05

## 2024-06-06 RX ORDER — VALSARTAN 80 MG/1
1 TABLET ORAL
Refills: 0 | DISCHARGE

## 2024-06-06 RX ORDER — VALSARTAN 80 MG/1
1 TABLET ORAL
Qty: 30 | Refills: 0
Start: 2024-06-06 | End: 2024-07-05

## 2024-06-06 RX ORDER — ACETAMINOPHEN 500 MG
2 TABLET ORAL
Qty: 84 | Refills: 0
Start: 2024-06-06 | End: 2024-06-19

## 2024-06-06 RX ORDER — POLYETHYLENE GLYCOL 3350 17 G/17G
17 POWDER, FOR SOLUTION ORAL
Qty: 119 | Refills: 0
Start: 2024-06-06 | End: 2024-06-12

## 2024-06-06 RX ORDER — LIDOCAINE 4 G/100G
1 CREAM TOPICAL
Qty: 7 | Refills: 0
Start: 2024-06-06 | End: 2024-06-12

## 2024-06-06 RX ORDER — SENNA PLUS 8.6 MG/1
2 TABLET ORAL
Qty: 14 | Refills: 0
Start: 2024-06-06 | End: 2024-06-12

## 2024-06-06 RX ADMIN — PANTOPRAZOLE SODIUM 40 MILLIGRAM(S): 20 TABLET, DELAYED RELEASE ORAL at 07:02

## 2024-06-06 RX ADMIN — FLUTICASONE FUROATE, UMECLIDINIUM BROMIDE AND VILANTEROL TRIFENATATE 1 PUFF(S): 200; 62.5; 25 POWDER RESPIRATORY (INHALATION) at 12:11

## 2024-06-06 RX ADMIN — Medication 975 MILLIGRAM(S): at 11:43

## 2024-06-06 RX ADMIN — Medication 975 MILLIGRAM(S): at 05:07

## 2024-06-06 RX ADMIN — Medication 975 MILLIGRAM(S): at 05:48

## 2024-06-06 RX ADMIN — VALSARTAN 320 MILLIGRAM(S): 80 TABLET ORAL at 05:07

## 2024-06-06 RX ADMIN — LIDOCAINE 1 PATCH: 4 CREAM TOPICAL at 11:43

## 2024-06-06 NOTE — DISCHARGE NOTE PROVIDER - DETAILS OF MALNUTRITION DIAGNOSIS/DIAGNOSES
This patient has been assessed with a concern for Malnutrition and was treated during this hospitalization for the following Nutrition diagnosis/diagnoses:     -  05/31/2024: Underweight (BMI < 19)

## 2024-06-06 NOTE — DISCHARGE NOTE PROVIDER - NSDCCPCAREPLAN_GEN_ALL_CORE_FT
PRINCIPAL DISCHARGE DIAGNOSIS  Diagnosis: Lung mass  Assessment and Plan of Treatment:       SECONDARY DISCHARGE DIAGNOSES  Diagnosis: HTN (hypertension)  Assessment and Plan of Treatment:     Diagnosis: HLD (hyperlipidemia)  Assessment and Plan of Treatment:     Diagnosis: Asthma  Assessment and Plan of Treatment:     Diagnosis: Osteoporosis  Assessment and Plan of Treatment:

## 2024-06-06 NOTE — DISCHARGE NOTE NURSING/CASE MANAGEMENT/SOCIAL WORK - PATIENT PORTAL LINK FT
You can access the FollowMyHealth Patient Portal offered by [facility] by registering at the following website: [email or web address]. By joining [facility]’s FollowMyHealth portal, you will also be able to view your health information using other applications (apps) compatible with our system.

## 2024-06-06 NOTE — DISCHARGE NOTE PROVIDER - NSDCFUADDAPPT_GEN_ALL_CORE_FT
You should receive a phone call from our office informing you of your follow up appointments. If you do not hear from them by 6/10, please call [phone]. Thank you.   You should make a follow up appointment with your primary care doctor upon discharge to review your blood pressure medication regimen with her.

## 2024-06-06 NOTE — DISCHARGE NOTE PROVIDER - NSDCMRMEDTOKEN_GEN_ALL_CORE_FT
aspirin 81 mg oral delayed release tablet: 1 tab(s) orally once a day  atorvastatin 40 mg oral tablet: 1 tab(s) orally once a day  azelastine 137 mcg/inh (0.1%) nasal spray: 2 spray(s) intranasally  desloratadine 5 mg oral tablet: 1 tab(s) orally once a day  Multiple Vitamins oral tablet: 1 tab(s) orally once a day  Rhopressa 0.02% ophthalmic solution: 1 drop(s) in each eye once a day (at bedtime)  tafluprost 0.0015% ophthalmic solution: 1 drop(s) in each affected eye once a day (at bedtime)  traZODone 50 mg oral tablet: orally once a day (at bedtime)  Trelegy Ellipta 100 mcg-62.5 mcg-25 mcg/inh inhalation powder: 1 puff(s) inhaled once a day  valsartan 160 mg oral capsule: 1 orally once a day  valsartan 80 mg oral tablet: 1 tab(s) orally once a day   aspirin 81 mg oral delayed release tablet: 1 tab(s) orally once a day  atorvastatin 40 mg oral tablet: 1 tab(s) orally once a day  azelastine 137 mcg/inh (0.1%) nasal spray: 2 spray(s) intranasally  desloratadine 5 mg oral tablet: 1 tab(s) orally once a day  Diovan 320 mg oral tablet: 1 tab(s) orally once a day  Multiple Vitamins oral tablet: 1 tab(s) orally once a day  pantoprazole 40 mg oral delayed release tablet: 1 tab(s) orally once a day (before a meal)  Rhopressa 0.02% ophthalmic solution: 1 drop(s) in each eye once a day (at bedtime)  tafluprost 0.0015% ophthalmic solution: 1 drop(s) in each affected eye once a day (at bedtime)  traZODone 50 mg oral tablet: orally once a day (at bedtime)  Trelegy Ellipta 100 mcg-62.5 mcg-25 mcg/inh inhalation powder: 1 puff(s) inhaled once a day  Tylenol 325 mg oral tablet: 2 tab(s) orally every 8 hours as needed for  moderate pain

## 2024-06-06 NOTE — DISCHARGE NOTE PROVIDER - PROVIDER TOKENS
PROVIDER:[TOKEN:[[id number]],FOLLOWUP:[2 weeks]],PROVIDER:[TOKEN:[[id number]],FOLLOWUP:[2 weeks]]

## 2024-06-06 NOTE — DISCHARGE NOTE PROVIDER - NSDCQMAMI_CARD_ALL_CORE
NUTRITION COMPLETE ASSESSMENT    RECOMMENDATIONS:   1. Continue diet as ordered  2. Weekly weights  Interventions/Plan:   None at this time  Assessment:   Reason for Assessment: [x]Reassessment     Diet: Regular  Meds: dulcolax, HCTZ, SSI, ativan, januvia    Chart reviewed, discussed with RN. Pt admitted with schizoaffective disorder and chronic psychosis. Spoke with RN via phone. She reports pt doing very well with meals. No nutrition needs at this time. Per weights in EHR, pt with 60# weight loss x 14 months. Discussed with staff to continue to get weekly weights to monitor trends. Last 3 Recorded Weights in this Encounter    05/18/17 0515 05/24/17 1220   Weight: 61.9 kg (136 lb 8 oz) 59 kg (130 lb)     With pt eating well at low nutrition risk. Will sign off, please consult if wt continues to trend down or PO decreases. Estimated Nutrition Needs:   Kcals/day: 1550 Kcals/day (6308-5590 kcal/day (25-30 kcal/kg))  Protein: 62 g (1 g/kg)  Fluid: 1860 ml (~1 mL/kcal)  Based On: Kcal/kg - specify (Comment)  Weight Used: Actual wt (61.9 kg)    Pt expected to meet estimated nutrient needs:  [x]   Yes  Nutrition Diagnosis:   1.  Inadequate protein-energy intake (resolved) related to unknown etiology as evidenced by reports in H&P and 60# weight loss x 14 months, pt now eating well     Goals:     Pt to consume at least 75% of meals over the next 5-7 days     Monitoring & Evaluation:    - Weight/weight change     Previous Nutrition Goals Met:  N/A  Previous Recommendations:    N/A    Cultural, Anabaptist and ethnic food preferences identified: NONE   Skin Integrity: [x]Intact  []Other  Edema: [x]None []Other  Last BM: PTA  Food Allergies: [x]None []Other    Anthropometrics:    Weight Loss Metrics 5/24/2017 3/14/2016 7/21/2015 7/4/2015 6/15/2015 6/5/2015 5/23/2015   Today's Wt 130 lb 196 lb 3.2 oz 200 lb 200 lb 200 lb 200 lb 8 oz 200 lb   BMI 21.63 kg/m2 32.65 kg/m2 33.28 kg/m2 33.28 kg/m2 33.28 kg/m2 33.36 kg/m2 33.28 kg/m2      Last 3 Recorded Weights in this Encounter    05/18/17 0515 05/24/17 1220   Weight: 61.9 kg (136 lb 8 oz) 59 kg (130 lb)      Weight Source: Standing scale (comment)  Height: 5' 5\" (165.1 cm),    Body mass index is 21.63 kg/(m^2).   IBW : 56.7 kg (125 lb),    Usual Body Weight: 88.9 kg (196 lb) (14 months ago),      Labs:    Lab Results   Component Value Date/Time    Sodium 137 05/27/2017 09:40 AM    Potassium 4.1 05/27/2017 09:40 AM    Chloride 102 05/27/2017 09:40 AM    CO2 25 05/27/2017 09:40 AM    Glucose 136 05/27/2017 09:40 AM    BUN 36 05/27/2017 09:40 AM    Creatinine 1.00 05/27/2017 09:40 AM    Calcium 8.7 05/27/2017 09:40 AM    Magnesium 1.1 07/21/2015 06:21 PM    Albumin 3.7 05/27/2017 09:40 AM       Shakila Vidales RD No

## 2024-06-06 NOTE — DISCHARGE NOTE PROVIDER - DISCHARGE DATE
06-Jun-2024 Brain aneurysm    Diabetes insipidus    HTN (Hypertension)    Memory loss, short term    Subarachnoid Hemorrhage

## 2024-06-06 NOTE — DISCHARGE NOTE PROVIDER - NSDCCPTREATMENT_GEN_ALL_CORE_FT
PRINCIPAL PROCEDURE  Procedure: Excision of lung lingula  Findings and Treatment: L VATS RA Lingulectomy MLND

## 2024-06-06 NOTE — DISCHARGE NOTE PROVIDER - CARE PROVIDERS DIRECT ADDRESSES
[email or web address]

## 2024-06-06 NOTE — DISCHARGE NOTE NURSING/CASE MANAGEMENT/SOCIAL WORK - NSDCFUADDAPPT_GEN_ALL_CORE_FT
You should receive a phone call from our office informing you of your follow up appointments. If you do not hear from them by 6/10, please call [phone]. Thank you.   You should make a follow up appointment with your primary care doctor upon discharge to review your blood pressure medication regimen with her.

## 2024-06-06 NOTE — CHART NOTE - NSCHARTNOTEFT_GEN_A_CORE
As per Dr. Rader, R chest tube removed at bedside without incident. Occlusive dressing in place. Patient tolerated procedure well. Follow up CXR with unchanged pneumothorax.

## 2024-06-06 NOTE — DISCHARGE NOTE PROVIDER - NSDCFUSCHEDAPPT_GEN_ALL_CORE_FT
Clifford Conti  [facility] PreAdmits  Scheduled Appointment: 06/07/2024    [facility]  THORSURG [address]  Scheduled Appointment: 06/13/2024    Kaelyn Harris  [facility]  PULMMED [address]  Scheduled Appointment: 06/18/2024    Watson Bolivar  [facility]  OTOLARYNG [address]  Scheduled Appointment: 06/25/2024    Herminio Espinal  [facility]  HEARTVASC [address]  Scheduled Appointment: 08/05/2024

## 2024-06-06 NOTE — DISCHARGE NOTE PROVIDER - HOSPITAL COURSE
Patient discussed on morning rounds with Dr. Rader    Operation Date: Neuro: A&Ox3.  No focal deficits.  Moving all extremities.   HEENT: No obvious abnormalities  CV: S1S2, regular, no murmurs appreciated.    Lungs: +L lung w/ crackles. R lung CTA no wheezing, rales or rhonchi  ABD: Soft, non-tender, non-distended.  +Bowel sounds  EXT: Warm and well perfused.  No peripheral edema noted  Musculoskeletal: Moving all extremities with normal ROM, no joint swelling  PV: Pedal pulses palpable  Incision Sites: VATS incisions healing well. +L CT in place with dressing c/d/i    Primary Surgeon/Attending MD:     Referring Physician:   _ _ _ _ _ _ _ _ _ _ _ _  HOSPITAL COURSE:  74 y/o F, never smoker, with a PMHx of HTN, HLD, PAD, b/l carotid stenosis (s/p L CEA with roof patch on 10/2019, s/p R CEA on 4/24/2021), hx of cancer melanoma 2x: 10+years ago, referred by Dr. Kaelyn Harris for neuroendocrine tumor, confirmed through robotic assisted bronchoscopy, ebus, tbna on 04/19 with Dr. Garcia. Patient now s/p L VATS RA Lingulectomy MLND (5/29/24), arrived to PACU with 1 CT in place, on suction, no air leak. POD1, patient on waterseal in AM w/ noted airleak just prior to pulling CT, so CT left in place, placed to wall suction w/ repeat CXR obtained. POD2 her CT was placed to waterseal, and her valsartan  was restarted. POD3, her AM waterseal CXR showed a worsened apical PTX. She underwent a blood patch pleurodesis and her CT was placed back to suction. She was hypertensive to the 190s with symptomatic dizziness, and was given hydralizine 10mg IV x2 and .25mg xanax x 1 for anxiety with resolution of her BP and dizziness. POD3 her L apical PTX is improved on CXR, plan to keep her CT to suction today. POD 4 patient w/ continued airleak, so repeat blood patch done today, placed on waterseal, now on suction for the afternoon/overnight. POD 5 patient with stable appearing CXR on suction and then clamped, leaving tube clamped until 12pm and will repeat CXR. If Xray is stable, will consider pulling tube.     _ _ _ _ _ _ _ _ _ _ _ _  DISCHARGE PHYSICAL EXAM:  Neuro: A&Ox3.  No focal deficits.  Moving all extremities.   HEENT: No obvious abnormalities  CV: S1S2, regular, no murmurs appreciated.    Lungs: B/l Lungs CTA no wheezing, rales or rhonchi  ABD: Soft, non-tender, non-distended.  +Bowel sounds  EXT: Warm and well perfused.  No peripheral edema noted  Musculoskeletal: Moving all extremities with normal ROM, no joint swelling  PV: Pedal pulses palpable  Incision Sites: VATS incisions healing well. +Crepitus to L posterior back, near incision sites. +L CT removed w/ dressing c/d/i   _ _ _ _ _ _ _ _ _ _ _ _  REMOVAL CHECKLIST:        [ ] Epicardial wires          [ ] Stitches/tie downs,   If no, why? ______          [ ] PICC/Midline,   If no, why? ________  _ _ _ _ _ _ _ _ _ _ _ _   MEDICATION DISCHARGE CHECKLIST  S/p Bronchoscopy, LVATS, RA, Lingular segmentectomy, MLND  _ _ _ _ _ _ _ _ _ _ _ _  RELEVANT LABS/IMAGING:    _ _ _ _ _ _ _ _ _ _ _ _  CLINICAL FOLLOW UP NEEDS:     [ ] Labwork           Labs needed:           When/Timing:           Outpatient team aware: YES/NO         [ ] Imaging            Type:           When/Timing:           Outpatient team aware: YES/NO       [ ] Home equipment           Type: (i.e. wound vac, pneumostat, prevena, wet/dry dressings, picc/midlines, MCOT, felix etc)           Specific needs:           Outpatient team aware: YES/NO  _ _ _ _ _ _ _ _ _ _ _ _  Over 35 minutes was spent with the patient reviewing the discharge material including medications, follow up appointments, recovery, concerning symptoms, and how to contact their health care providers if they have questions   Patient discussed on morning rounds with Dr. Rader    Operation Date: Bronchoscopy, LVATS, RA, Lingular segmentectomy, MLND, 5/29/24    Primary Surgeon/Attending MD:     Referring Physician:   _ _ _ _ _ _ _ _ _ _ _ _  HOSPITAL COURSE:  72 y/o F, never smoker, with a PMHx of HTN, HLD, PAD, b/l carotid stenosis (s/p L CEA with roof patch on 10/2019, s/p R CEA on 4/24/2021), hx of cancer melanoma 2x: 10+years ago, referred by Dr. Kaelyn Harris for neuroendocrine tumor, confirmed through robotic assisted bronchoscopy, ebus, tbna on 04/19 with Dr. Garcia. Patient now s/p L VATS RA Lingulectomy MLND (5/29/24), arrived to PACU with 1 CT in place, on suction, no air leak. POD1, patient on waterseal in AM w/ noted airleak just prior to pulling CT, so CT left in place, placed to wall suction w/ repeat CXR obtained. POD2 her CT was placed to waterseal, and her valsartan  was restarted. POD3, her AM waterseal CXR showed a worsened apical PTX. She underwent a blood patch pleurodesis and her CT was placed back to suction. She was hypertensive to the 190s with symptomatic dizziness, and was given hydralizine 10mg IV x2 and .25mg xanax x 1 for anxiety with resolution of her BP and dizziness. POD3 her L apical PTX was improved on CXR, so CT was left on suction. POD 4 patient w/ continued airleak, so repeat blood patch done, placed on waterseal, left on suction for the afternoon/overnight. POD 5 patient with stable appearing CXR on suction and then clamped, left tube clamped until 12pm w/ stable repeat CXR. POD6 Patient remained clamped overnight with no airleak and stable AM chest xray, so L chest tube was removed. Post pull chest tube remained stable without increased penumothorax to L apical area. Patient continued to remain hemodynamically stable and ambulating on room air. As per  patient is ready for discharge on 6/6/24.    _ _ _ _ _ _ _ _ _ _ _ _  DISCHARGE PHYSICAL EXAM:  Neuro: A&Ox3.  No focal deficits.  Moving all extremities.   HEENT: No obvious abnormalities  CV: S1S2, regular, no murmurs appreciated.    Lungs: B/l Lungs CTA no wheezing, rales or rhonchi  ABD: Soft, non-tender, non-distended.  +Bowel sounds  EXT: Warm and well perfused.  No peripheral edema noted  Musculoskeletal: Moving all extremities with normal ROM, no joint swelling  PV: Pedal pulses palpable  Incision Sites: VATS incisions healing well. +Crepitus to L posterior back, near incision sites. +L CT removed w/ dressing c/d/i   _ _ _ _ _ _ _ _ _ _ _ _  REMOVAL CHECKLIST:        [ ] Epicardial wires          [X] Stitches/tie downs,   If no, why? will remove in clinic          [ ] PICC/Midline,   If no, why? ________  _ _ _ _ _ _ _ _ _ _ _ _   MEDICATION DISCHARGE CHECKLIST  N/A  _ _ _ _ _ _ _ _ _ _ _ _  RELEVANT LABS/IMAGING:  N/A  _ _ _ _ _ _ _ _ _ _ _ _  CLINICAL FOLLOW UP NEEDS:  N/A  _ _ _ _ _ _ _ _ _ _ _ _  Over 35 minutes was spent with the patient reviewing the discharge material including medications, follow up appointments, recovery, concerning symptoms, and how to contact their health care providers if they have questions

## 2024-06-06 NOTE — DISCHARGE NOTE PROVIDER - CARE PROVIDER_API CALL
Otoniel Rader  Thoracic Surgery  [address]  [place] [address]  Phone: [phone]  Fax: [phone]  Follow Up Time: 2 weeks    Kaelyn Harris  Critical Care Medicine  [address]  [place] [address]  Phone: [phone]  Fax: [phone]  Follow Up Time: 2 weeks

## 2024-06-07 ENCOUNTER — NON-APPOINTMENT (OUTPATIENT)
Age: 74
End: 2024-06-07

## 2024-06-10 ENCOUNTER — APPOINTMENT (OUTPATIENT)
Dept: INTERNAL MEDICINE | Facility: CLINIC | Age: 74
End: 2024-06-10
Payer: COMMERCIAL

## 2024-06-10 VITALS
OXYGEN SATURATION: 98 % | TEMPERATURE: 98.6 F | BODY MASS INDEX: 17.48 KG/M2 | DIASTOLIC BLOOD PRESSURE: 80 MMHG | SYSTOLIC BLOOD PRESSURE: 154 MMHG | WEIGHT: 118 LBS | HEART RATE: 77 BPM | HEIGHT: 69 IN | RESPIRATION RATE: 14 BRPM

## 2024-06-10 DIAGNOSIS — R05.9 COUGH, UNSPECIFIED: ICD-10-CM

## 2024-06-10 PROCEDURE — 99214 OFFICE O/P EST MOD 30 MIN: CPT

## 2024-06-10 RX ORDER — VALSARTAN 80 MG/1
80 TABLET, COATED ORAL DAILY
Qty: 90 | Refills: 3 | Status: DISCONTINUED | COMMUNITY
Start: 2019-06-26 | End: 2024-06-10

## 2024-06-10 NOTE — PLAN
[FreeTextEntry1] : HTN-  recommend taking 320mg daily of valsartan and f/up in 2 weeks Chest wall lesion - need to keep it clean and open to air to allow for healing. Cough- should slowly improve, continue trelegy activity as tolerated.

## 2024-06-10 NOTE — HISTORY OF PRESENT ILLNESS
[de-identified] : 73 yo F with  HTN, HLD, PAD , high grade L ICA stenosis s/p L CEA with roof patch on 10/2019 and high grade R ICA stenosis s/p R CEA on 4/24/2021. - s/p left VATS - neuroendocrine lung tumor.  - doing well at home.  some SOB.  - took her regular dosing of valsartan - 240mg and not the 320mg while in the hospital.    - mild cough.

## 2024-06-10 NOTE — PHYSICAL EXAM
[Normal] : normal rate, regular rhythm, normal S1 and S2 and no murmur heard [No Edema] : there was no peripheral edema [de-identified] : 1cm left chest wal lesion with open lesion - no erythema or warmth

## 2024-06-10 NOTE — REVIEW OF SYSTEMS
[Fatigue] : fatigue [Shortness Of Breath] : no shortness of breath [Cough] : cough [Dyspnea on Exertion] : dyspnea on exertion [Negative] : Psychiatric

## 2024-06-13 ENCOUNTER — NON-APPOINTMENT (OUTPATIENT)
Age: 74
End: 2024-06-13

## 2024-06-18 ENCOUNTER — APPOINTMENT (OUTPATIENT)
Dept: PULMONOLOGY | Facility: CLINIC | Age: 74
End: 2024-06-18
Payer: COMMERCIAL

## 2024-06-18 VITALS
HEART RATE: 84 BPM | TEMPERATURE: 97.8 F | SYSTOLIC BLOOD PRESSURE: 122 MMHG | DIASTOLIC BLOOD PRESSURE: 66 MMHG | BODY MASS INDEX: 17.03 KG/M2 | HEIGHT: 69 IN | OXYGEN SATURATION: 92 % | WEIGHT: 115 LBS

## 2024-06-18 DIAGNOSIS — D3A.090 BENIGN CARCINOID TUMOR OF THE BRONCHUS AND LUNG: ICD-10-CM

## 2024-06-18 DIAGNOSIS — J44.9 CHRONIC OBSTRUCTIVE PULMONARY DISEASE, UNSPECIFIED: ICD-10-CM

## 2024-06-18 PROCEDURE — 99213 OFFICE O/P EST LOW 20 MIN: CPT

## 2024-06-18 PROCEDURE — G2211 COMPLEX E/M VISIT ADD ON: CPT | Mod: NC,1L

## 2024-06-18 NOTE — ASSESSMENT
[FreeTextEntry1] : Data reviewed:  CT chest [facility] 3/20/2024 personally reviewed : 1.9 cm left upper lobe nodule - very round and smooth, questionably connected to vessel. Scattered consolidative regions RUL, RML, lingula. Small airways inflammation.  PET [facility] 4/3/2024 personally reviewed : 1. Hypermetabolic 16 mm nodule at the posterior left upper lobe, SUV max 7.4, suspicious for malignancy.  2. Scattered FDG avid nodular and airspace opacities, some new, consistent with an infectious process (large airway and small airway disease).   PFT 04/10/2024 : mod fixed obstruction, FEV1 56%, TLC 79%, DLCO 32% / FENO 12  Impression: FDG avid 19mm nodule, very round COPD, never smoker, small airways inflammation on scan  Plan: Will present these images at [facility] tomorrow and discuss next steps. Does she need CT contrast prior to a bx? Has contrast allergy. Would like to give trial of inhalers, need to check w ophtho. Ophtho = Dr Clifford Conti [phone].

## 2024-06-18 NOTE — PHYSICAL EXAM
[No Acute Distress] : no acute distress [Normal Rate/Rhythm] : normal rate/rhythm [Normal S1, S2] : normal s1, s2 [No Murmurs] : no murmurs [No Resp Distress] : no resp distress [Clear to Auscultation Bilaterally] : clear to auscultation bilaterally [TextBox_80] : VATS incisions well healed; chest tube site is healing

## 2024-06-18 NOTE — ASSESSMENT
[FreeTextEntry1] : Data reviewed:  CT chest [facility] 3/20/2024 : 1.9 cm left upper lobe nodule, small airways inflammation.  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 See me 3 mos or sooner for any new problems. Will cont to recover from surgery.

## 2024-06-18 NOTE — HISTORY OF PRESENT ILLNESS
[Never] : never [TextBox_4] : 04/10/2024: Asked to evaluate patient by Dr Bolivar for cough and concurrently by Dr Rogers for lung nodule. Had Covid in Sept (vaxxed, no Paxlovid) and has been coughing ever since. Has tried tessalon, inhalers (short term), Flonase, and codeine and these did not help her at all. Cough persists and is not changing. Never smoked but her father was a smoker and she had heavy second hand exposure and she had frequent bronchitis and chest infections. 15 years ago she was given a dx of asthma and took inhalers then but she didn't stay on them because of the glaucoma. Finally got a CXR because of the cough showing this nodule. And saw Dr Bolivar for the cough who thought perhaps she has silent GERD and has her doing sinus rinses, azelastine and no coffee and other acidic food. 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 Hx melanoma x 2 over 10 years ago and skin checks now fine. Mother had pancreatic cancer. No lung cancer in family.  4/11/2024: "Monse." Follow up visit conducted by telehealth due to Covid pandemic. The patient gives consent for telehealth services, the patient and I are both in [facility], and the patient, her , and I are the only participants on the call. See discussion below.  6/18/2024: Comes in w her  for post op follow up after lingular segmentectomy w Dr Rader 5/29/2024 for typical carcinoid tumor. Complicated by persistent air leak and 9 day stay. Appetite is down, lost weight. Still a little cough. Getting better every day. Walking a mile a day. Still on the Trelegy. Will see her ophtho.

## 2024-06-18 NOTE — HISTORY OF PRESENT ILLNESS
[Never] : never [TextBox_4] : 04/10/2024: Asked to evaluate patient by Dr Bolivar for cough and concurrently by Dr Rogers for lung nodule. Had Covid in Sept (vaxxed, no Paxlovid) and has been coughing ever since. Has tried tessalon, inhalers (short term), Flonase, and codeine and these did not help her at all. Cough persists and is not changing. Never smoked but her father was a smoker and she had heavy second hand exposure and she had frequent bronchitis and chest infections. 15 years ago she was given a dx of asthma and took inhalers then but she didn't stay on them because of the glaucoma. Finally got a CXR because of the cough showing this nodule. And saw Dr Bolivar for the cough who thought perhaps she has silent GERD and has her doing sinus rinses, azelastine and no coffee and other acidic food. 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 Hx melanoma x 2 over 10 years ago and skin checks now fine. Mother had pancreatic cancer. No lung cancer in family.  4/11/2024: "Monse." Follow up visit conducted by telehealth due to Covid pandemic. The patient gives consent for telehealth services, the patient and I are both in [facility], and the patient, her , and I are the only participants on the call. See discussion below.

## 2024-06-18 NOTE — HISTORY OF PRESENT ILLNESS
[Never] : never [TextBox_4] : 04/10/2024: Asked to evaluate patient by Dr Bolivar for cough and concurrently by Dr Rogers for lung nodule. Had Covid in Sept (vaxxed, no Paxlovid) and has been coughing ever since. Has tried tessalon, inhalers (short term), Flonase, and codeine and these did not help her at all. Cough persists and is not changing. Never smoked but her father was a smoker and she had heavy second hand exposure and she had frequent bronchitis and chest infections. 15 years ago she was given a dx of asthma and took inhalers then but she didn't stay on them because of the glaucoma. Finally got a CXR because of the cough showing this nodule. And saw Dr Bolivar for the cough who thought perhaps she has silent GERD and has her doing sinus rinses, azelastine and no coffee and other acidic food. 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 Hx melanoma x 2 over 10 years ago and skin checks now fine. Mother had pancreatic cancer. No lung cancer in family. [ESS] : 3

## 2024-06-20 ENCOUNTER — OUTPATIENT (OUTPATIENT)
Dept: OUTPATIENT SERVICES | Facility: HOSPITAL | Age: 74
LOS: 1 days | End: 2024-06-20
Payer: COMMERCIAL

## 2024-06-20 ENCOUNTER — NON-APPOINTMENT (OUTPATIENT)
Age: 74
End: 2024-06-20

## 2024-06-20 ENCOUNTER — APPOINTMENT (OUTPATIENT)
Dept: INTERNAL MEDICINE | Facility: CLINIC | Age: 74
End: 2024-06-20
Payer: COMMERCIAL

## 2024-06-20 ENCOUNTER — APPOINTMENT (OUTPATIENT)
Dept: THORACIC SURGERY | Facility: CLINIC | Age: 74
End: 2024-06-20
Payer: COMMERCIAL

## 2024-06-20 VITALS
SYSTOLIC BLOOD PRESSURE: 140 MMHG | WEIGHT: 118 LBS | DIASTOLIC BLOOD PRESSURE: 80 MMHG | HEART RATE: 75 BPM | RESPIRATION RATE: 14 BRPM | BODY MASS INDEX: 17.48 KG/M2 | TEMPERATURE: 98.4 F | HEIGHT: 69 IN | OXYGEN SATURATION: 97 %

## 2024-06-20 VITALS
BODY MASS INDEX: 17.48 KG/M2 | HEIGHT: 69 IN | HEART RATE: 88 BPM | DIASTOLIC BLOOD PRESSURE: 63 MMHG | WEIGHT: 118 LBS | TEMPERATURE: 97.2 F | SYSTOLIC BLOOD PRESSURE: 131 MMHG | OXYGEN SATURATION: 92 %

## 2024-06-20 DIAGNOSIS — R06.02 SHORTNESS OF BREATH: ICD-10-CM

## 2024-06-20 DIAGNOSIS — Z87.59 PERSONAL HISTORY OF OTHER COMPLICATIONS OF PREGNANCY, CHILDBIRTH AND THE PUERPERIUM: Chronic | ICD-10-CM

## 2024-06-20 DIAGNOSIS — I10 ESSENTIAL (PRIMARY) HYPERTENSION: ICD-10-CM

## 2024-06-20 DIAGNOSIS — D3A.00 BENIGN CARCINOID TUMOR OF UNSPECIFIED SITE: ICD-10-CM

## 2024-06-20 DIAGNOSIS — Z41.9 ENCOUNTER FOR PROCEDURE FOR PURPOSES OTHER THAN REMEDYING HEALTH STATE, UNSPECIFIED: Chronic | ICD-10-CM

## 2024-06-20 DIAGNOSIS — R91.1 SOLITARY PULMONARY NODULE: ICD-10-CM

## 2024-06-20 DIAGNOSIS — Z90.89 ACQUIRED ABSENCE OF OTHER ORGANS: Chronic | ICD-10-CM

## 2024-06-20 PROCEDURE — 71046 X-RAY EXAM CHEST 2 VIEWS: CPT | Mod: 26

## 2024-06-20 PROCEDURE — 99213 OFFICE O/P EST LOW 20 MIN: CPT

## 2024-06-20 PROCEDURE — 71046 X-RAY EXAM CHEST 2 VIEWS: CPT

## 2024-06-20 PROCEDURE — 99024 POSTOP FOLLOW-UP VISIT: CPT

## 2024-06-20 NOTE — PHYSICAL EXAM
[Normal] : no acute distress, well nourished, well developed and well-appearing [de-identified] : 1cm left chest wal lesion with open lesion - no erythema or warmth

## 2024-06-20 NOTE — REASON FOR VISIT
[de-identified] :  Bronchoscopy.  Left VATS robotic-assisted lingular segmentectomy with use of ICG for intersegmental plane determination.  Mediastinal and hilar lymph node dissection. [de-identified] : 5/29/24 [de-identified] : 3 [de-identified] : Pathology:   Total Tumor Size: 1.8 cm Typical carcinoid / Neuroendocrine tumor, grade 1  DEMETRIA: Not identified  Visceral Pleura Invasion: Not identified  Lymphovascular Invasion: Not identified  All margins negative  All regional lymph nodes negative for tumor  11 Dale Sites Examined [id number] - 1A2  Patient reports that she is recovering well from her procedure. She has some soreness to the surgical site but has been active at home and is walking frequently. She reports that she is still coughing periodically but her cough has improved from pre op.

## 2024-06-20 NOTE — COUNSELING
[Importance of Regular Medical Follow-Up] : the importance of regular medical follow-up [S/S of infection] : signs and symptoms of infection (and to whom it should be reported) [Progressive Ambulation/Activity] : progressive ambulation/activity

## 2024-06-20 NOTE — HISTORY OF PRESENT ILLNESS
[de-identified] : 74 yo F with  HTN, HLD, PAD , high grade L ICA stenosis s/p L CEA with roof patch on 10/2019 and high grade R ICA stenosis s/p R CEA on 4/24/2021. - Has been feeling mildly lightheaded at times.  Has not checked BP at home.   - s/p evaluations with Dr Harris and Inra.       - s/p left VATS - neuroendocrine lung tumor.  - doing well at home.  some SOB.  - took her regular dosing of valsartan - 240mg and not the 320mg while in the hospital.    - mild cough.

## 2024-06-20 NOTE — DISCUSSION/SUMMARY
[Excellent Pain Control] : has excellent pain control [1] : 1 [de-identified] : no suture in place

## 2024-06-20 NOTE — PLAN
[FreeTextEntry1] : HTN- well controlled but with episodes of dizziness will go back down to 240mg of the valsartan with the caveat that she check her BP at home while at rest to make sure that it remains <130/80  Monitor wound left chest wall - slowly improving.

## 2024-06-20 NOTE — ASSESSMENT
[FreeTextEntry1] : 73-year-old female, never smoker, was referred by Dr. 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 Patient underwent staging and evaluation for resection. She is status post  left VATS robotic assisted lingulectomy and mediastinal lymph node dissection on 5/29/24. Hospital course was significant for a prolonged air leak. On post operative day 6, the chest tube was removed after resolution of the leak.  She presents today for her fist post op visit with CXR. Overall she is, doing well. No shortness of breath and pain is well controlled. She has an intermittent cough. Her incisions are well healed. Her chest tube site has some fibrinous exudate but is not infected. Recommended dressing changes over that site for local debridement.   Chest x-ray is not concerning. Her lungs are well expanded without effusions.   Pathology was consistent with a typical carcinoid tumor, 1.8 cm in size without positive nodes, pathologic stage IA2.  Will continue with surveillance as indicated by the NCCN guidelines.   Mrs. Mraes is recovering as expected. Will see her back in 4 weeks, no x-ray required.   PLAN 1.  4 week follow up.     I, Dr. Rader, personally performed the evaluation and management (E/M) services for this established patient who presents today with (a) new problem(s)/exacerbation of (an) existing condition(s). That E/M includes conducting the clinically appropriate interval history &/or exam, assessing all new/exacerbated conditions, and establishing a new plan of care. Today, my DEENA, [Velia Prescott], was here to observe my evaluation and management service for this new problem/exacerbated condition and follow the plan of care established by me going forward.

## 2024-06-20 NOTE — PHYSICAL EXAM
[] : no respiratory distress [Respiration, Rhythm And Depth] : normal respiratory rhythm and effort [Exaggerated Use Of Accessory Muscles For Inspiration] : no accessory muscle use [Heart Rate And Rhythm] : heart rate was normal and rhythm regular [Heart Sounds] : normal S1 and S2 [Heart Sounds Gallop] : no gallops [Murmurs] : no murmurs [Heart Sounds Pericardial Friction Rub] : no pericardial rub [Site: ___] : Site: [unfilled] [Clean] : clean [Dry] : dry [Healing Well] : healing well [FreeTextEntry1] : chest tube site with fibrinous exudate, otherwise healing well  [No Edema] : no edema

## 2024-06-25 ENCOUNTER — APPOINTMENT (OUTPATIENT)
Dept: OTOLARYNGOLOGY | Facility: CLINIC | Age: 74
End: 2024-06-25

## 2024-06-28 ENCOUNTER — APPOINTMENT (OUTPATIENT)
Dept: PULMONOLOGY | Facility: CLINIC | Age: 74
End: 2024-06-28

## 2024-07-09 ENCOUNTER — LABORATORY RESULT (OUTPATIENT)
Age: 74
End: 2024-07-09

## 2024-07-09 RX ORDER — SULFAMETHOXAZOLE AND TRIMETHOPRIM 800; 160 MG/1; MG/1
800-160 TABLET ORAL TWICE DAILY
Qty: 10 | Refills: 2 | Status: ACTIVE | COMMUNITY
Start: 2024-07-09 | End: 1900-01-01

## 2024-07-10 LAB
APPEARANCE: CLEAR
BILIRUBIN URINE: NEGATIVE
BLOOD URINE: NEGATIVE
COLOR: YELLOW
GLUCOSE QUALITATIVE U: NEGATIVE MG/DL
KETONES URINE: NEGATIVE MG/DL
LEUKOCYTE ESTERASE URINE: ABNORMAL
NITRITE URINE: POSITIVE
PH URINE: 6
PROTEIN URINE: NEGATIVE MG/DL
SPECIFIC GRAVITY URINE: 1.01
UROBILINOGEN URINE: 0.2 MG/DL

## 2024-07-22 PROBLEM — Z09 POSTOP CHECK: Status: ACTIVE | Noted: 2024-07-22

## 2024-07-25 ENCOUNTER — APPOINTMENT (OUTPATIENT)
Dept: THORACIC SURGERY | Facility: CLINIC | Age: 74
End: 2024-07-25
Payer: COMMERCIAL

## 2024-07-25 DIAGNOSIS — Z09 ENCOUNTER FOR FOLLOW-UP EXAMINATION AFTER COMPLETED TREATMENT FOR CONDITIONS OTHER THAN MALIGNANT NEOPLASM: ICD-10-CM

## 2024-07-25 DIAGNOSIS — D3A.00 BENIGN CARCINOID TUMOR OF UNSPECIFIED SITE: ICD-10-CM

## 2024-07-25 DIAGNOSIS — Z85.118 ENCOUNTER FOR FOLLOW-UP EXAMINATION AFTER COMPLETED TREATMENT FOR MALIGNANT NEOPLASM: ICD-10-CM

## 2024-07-25 DIAGNOSIS — Z08 ENCOUNTER FOR FOLLOW-UP EXAMINATION AFTER COMPLETED TREATMENT FOR MALIGNANT NEOPLASM: ICD-10-CM

## 2024-07-25 PROCEDURE — 99024 POSTOP FOLLOW-UP VISIT: CPT

## 2024-07-25 RX ORDER — OMEPRAZOLE 40 MG/1
40 CAPSULE, DELAYED RELEASE ORAL
Refills: 0 | Status: ACTIVE | COMMUNITY

## 2024-07-25 NOTE — COUNSELING
[Hygeine (Including Daily Shower)] : hygeine (including daily shower) [Importance of Regular Medical Follow-Up] : the importance of regular medical follow-up [Progressive Ambulation/Activity] : progressive ambulation/activity

## 2024-07-25 NOTE — PHYSICAL EXAM
[] : no respiratory distress [Respiration, Rhythm And Depth] : normal respiratory rhythm and effort [Exaggerated Use Of Accessory Muscles For Inspiration] : no accessory muscle use [Site: ___] : Site: [unfilled] [Clean] : clean [Dry] : dry [Healing Well] : healing well [No Edema] : no edema

## 2024-07-25 NOTE — REASON FOR VISIT
[de-identified] : Bronchoscopy.  Left VATS robotic-assisted lingular segmentectomy with use of ICG for intersegmental plane determination.  Mediastinal and hilar lymph node dissection.  [de-identified] : 05/29/24 [de-identified] : 8 [de-identified] : Hospital course was significant for a prolonged air leak. On post operative day 6, the chest tube was removed after resolution of the leak.   Pathology: Total Tumor Size: 1.8 cm Typical carcinoid / Neuroendocrine tumor, grade 1 DEMETRIA: Not identified Visceral Pleura Invasion: Not identified Lymphovascular Invasion: Not identified All margins negative All regional lymph nodes negative for tumor 11 Dale Sites Examined [id number] - 1A2  Patient presents today for a 2nd poa visit.   Today the patient reports that she is recovering well post op. She reports that her operative side has some stiffness that has slightly improved since her last visit. She is seeing Dr. Harris and Dr. Bolivar for her cough and is taking omeprazole anf trillegy with some improvment. She is following a low acid diet.

## 2024-07-25 NOTE — ASSESSMENT
[FreeTextEntry1] : 73-year-old female, never smoker, was referred by Dr. 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 Patient underwent staging and evaluation for resection. She is status post left VATS robotic assisted lingulectomy and mediastinal lymph node dissection on 5/29/24. Hospital course was significant for a prolonged air leak. On post operative day 6, the chest tube was removed after resolution of the leak.  Pathology was consistent with a typical carcinoid tumor, 1.8 cm in size without positive nodes, pathologic stage IA2. No adjuvant treatment indicated. Will continue with surveillance as indicated by the NCCN guidelines.  She presents today for the 2nd POA visit with a CXR. Overall she is doing very well. Her cough has improved with GERD management. Her pain is well controlled. Her incisions are well healed. Her x-ray is not concerning. She is pleased with her recovery.   We will see her back in 8 weeks and plan for a CT scan 8 weeks after that. She understood and agreed.   PLAN 1.  8 week follow up 2.  CT surveillance in November I, Dr. Rader, personally performed the evaluation and management (E/M) services for this established patient who presents today with (a) new problem(s)/exacerbation of (an) existing condition(s). That E/M includes conducting the clinically appropriate interval history &/or exam, assessing all new/exacerbated conditions, and establishing a new plan of care. Today, my DEENA, [Velia Prescott], was here to observe my evaluation and management service for this new problem/exacerbated condition and follow the plan of care established by me going forward.

## 2024-07-25 NOTE — REASON FOR VISIT
[de-identified] : Bronchoscopy.  Left VATS robotic-assisted lingular segmentectomy with use of ICG for intersegmental plane determination.  Mediastinal and hilar lymph node dissection.  [de-identified] : 05/29/24 [de-identified] : 8 [de-identified] : Hospital course was significant for a prolonged air leak. On post operative day 6, the chest tube was removed after resolution of the leak.   Pathology: Total Tumor Size: 1.8 cm Typical carcinoid / Neuroendocrine tumor, grade 1 DEMETRIA: Not identified Visceral Pleura Invasion: Not identified Lymphovascular Invasion: Not identified All margins negative All regional lymph nodes negative for tumor 11 Dale Sites Examined [id number] - 1A2  Patient presents today for a 2nd poa visit.   Today the patient reports that she is recovering well post op. She reports that her operative side has some stiffness that has slightly improved since her last visit. She is seeing Dr. Harris and Dr. Bolivar for her cough and is taking omeprazole anf trillegy with some improvment. She is following a low acid diet.

## 2024-09-03 NOTE — HISTORY OF PRESENT ILLNESS
[FreeTextEntry1] : Elevated BP [de-identified] : Check BP twie recentl and it was eelvated.  Not symptomatic no vision changes. \par    EUGENIO

## 2024-09-10 ENCOUNTER — APPOINTMENT (OUTPATIENT)
Dept: INTERNAL MEDICINE | Facility: CLINIC | Age: 74
End: 2024-09-10

## 2024-09-10 VITALS
BODY MASS INDEX: 17.18 KG/M2 | TEMPERATURE: 98.4 F | DIASTOLIC BLOOD PRESSURE: 78 MMHG | HEIGHT: 69 IN | SYSTOLIC BLOOD PRESSURE: 122 MMHG | WEIGHT: 116 LBS

## 2024-09-10 PROCEDURE — G0008: CPT

## 2024-09-10 PROCEDURE — 90662 IIV NO PRSV INCREASED AG IM: CPT

## 2024-09-10 PROCEDURE — 99213 OFFICE O/P EST LOW 20 MIN: CPT | Mod: 25

## 2024-09-10 NOTE — HISTORY OF PRESENT ILLNESS
[de-identified] : 74 yo F with  HTN, HLD, PAD , high grade L ICA stenosis s/p L CEA with roof patch on 10/2019 and high grade R ICA stenosis s/p R CEA on 4/24/2021. - She is recovering well from her lung surgery.  Still with some left sided stiffness but overall much ebtter - taking her medications daily - Here for flu vaccine today.       - s/p left VATS - neuroendocrine lung tumor.  - doing well at home.  some SOB.  - took her regular dosing of valsartan - 240mg and not the 320mg while in the hospital.    - mild cough.

## 2024-09-10 NOTE — PLAN
[FreeTextEntry1] : HTN well controlled on current regimen flu vax admin today  f/up for CPE in one month

## 2024-09-17 ENCOUNTER — NON-APPOINTMENT (OUTPATIENT)
Age: 74
End: 2024-09-17

## 2024-09-17 ENCOUNTER — APPOINTMENT (OUTPATIENT)
Dept: INTERNAL MEDICINE | Facility: CLINIC | Age: 74
End: 2024-09-17
Payer: COMMERCIAL

## 2024-09-17 VITALS
HEIGHT: 69 IN | RESPIRATION RATE: 14 BRPM | HEART RATE: 73 BPM | TEMPERATURE: 97.4 F | SYSTOLIC BLOOD PRESSURE: 143 MMHG | DIASTOLIC BLOOD PRESSURE: 80 MMHG | WEIGHT: 116 LBS | BODY MASS INDEX: 17.18 KG/M2 | OXYGEN SATURATION: 98 %

## 2024-09-17 DIAGNOSIS — Z00.00 ENCOUNTER FOR GENERAL ADULT MEDICAL EXAMINATION W/OUT ABNORMAL FINDINGS: ICD-10-CM

## 2024-09-17 DIAGNOSIS — I10 ESSENTIAL (PRIMARY) HYPERTENSION: ICD-10-CM

## 2024-09-17 PROCEDURE — 99397 PER PM REEVAL EST PAT 65+ YR: CPT

## 2024-09-17 PROCEDURE — 36415 COLL VENOUS BLD VENIPUNCTURE: CPT

## 2024-09-17 PROCEDURE — 93000 ELECTROCARDIOGRAM COMPLETE: CPT

## 2024-09-18 ENCOUNTER — APPOINTMENT (OUTPATIENT)
Dept: HEART AND VASCULAR | Facility: CLINIC | Age: 74
End: 2024-09-18
Payer: COMMERCIAL

## 2024-09-18 ENCOUNTER — NON-APPOINTMENT (OUTPATIENT)
Age: 74
End: 2024-09-18

## 2024-09-18 DIAGNOSIS — R06.02 SHORTNESS OF BREATH: ICD-10-CM

## 2024-09-18 PROCEDURE — 93306 TTE W/DOPPLER COMPLETE: CPT

## 2024-09-18 NOTE — PLAN
[FreeTextEntry1] : Wellness complete labs today EKG NSR HTN well controlled on current regimen, plan for ECHO with Dr Teddy MILLER- close f/up withDr. Lopez Neuroendcrine lung tumor- routine f/up with Dr Rader Emphysema- controlled with Trelegy, close f/up with Dr Harris HCM: dexa, mammo,  - strongly encourage cologuard, she will consider - rec RSV and tdap  CATARACT SURGERY - Her EKG and labs have been reviewed. She is medically optimized and may proceed with upcoming cataract surgery

## 2024-09-18 NOTE — HISTORY OF PRESENT ILLNESS
[de-identified] : 74 yo F with  HTN, HLD, PAD , melanoma, high grade L ICA stenosis s/p L CEA with roof patch on 10/2019 and high grade R ICA stenosis s/p R CEA on 4/24/2021. neuroendocrine tumor in lung s/p left vats 5/29/24.  Continues to perform self catheterization due to chronic urinary retention/atonic bladder. - plan for cataract surgery on 10/4/24 with Dr Clifford Conti - BP has well controlled.    derm- due- with hx of melanoma Mammo- getting it annually with Dr Mcclelland Colonoscopy - with Myron Goldberg.  over 10 years ago.   Dexa: - likely due, may have been 4 years ago.

## 2024-09-18 NOTE — HISTORY OF PRESENT ILLNESS
[de-identified] : 74 yo F with  HTN, HLD, PAD , melanoma, high grade L ICA stenosis s/p L CEA with roof patch on 10/2019 and high grade R ICA stenosis s/p R CEA on 4/24/2021. neuroendocrine tumor in lung s/p left vats 5/29/24.  Continues to perform self catheterization due to chronic urinary retention/atonic bladder. - plan for cataract surgery on 10/4/24 with Dr Clifford Conti - BP has well controlled.    derm- due- with hx of melanoma Mammo- getting it annually with Dr Mcclelland Colonoscopy - with Myron Goldberg.  over 10 years ago.   Dexa: - likely due, may have been 4 years ago.

## 2024-09-18 NOTE — REVIEW OF SYSTEMS
[Vision Problems] : vision problems [Negative] : Heme/Lymph [FreeTextEntry8] : no issues with catheterizing

## 2024-09-19 ENCOUNTER — NON-APPOINTMENT (OUTPATIENT)
Age: 74
End: 2024-09-19

## 2024-09-19 LAB
ALBUMIN SERPL ELPH-MCNC: 4.6 G/DL
ALP BLD-CCNC: 167 U/L
ALT SERPL-CCNC: 31 U/L
ANION GAP SERPL CALC-SCNC: 12 MMOL/L
APPEARANCE: CLEAR
AST SERPL-CCNC: 32 U/L
BASOPHILS # BLD AUTO: 0.07 K/UL
BASOPHILS NFR BLD AUTO: 0.8 %
BILIRUB SERPL-MCNC: 0.4 MG/DL
BILIRUBIN URINE: NEGATIVE
BLOOD URINE: NEGATIVE
BUN SERPL-MCNC: 31 MG/DL
CALCIUM SERPL-MCNC: 10 MG/DL
CHLORIDE SERPL-SCNC: 98 MMOL/L
CHOLEST SERPL-MCNC: 223 MG/DL
CO2 SERPL-SCNC: 24 MMOL/L
COLOR: YELLOW
CREAT SERPL-MCNC: 0.98 MG/DL
EGFR: 61 ML/MIN/1.73M2
EOSINOPHIL # BLD AUTO: 0.35 K/UL
EOSINOPHIL NFR BLD AUTO: 4.1 %
ESTIMATED AVERAGE GLUCOSE: 117 MG/DL
GLUCOSE QUALITATIVE U: NEGATIVE MG/DL
GLUCOSE SERPL-MCNC: 99 MG/DL
HBA1C MFR BLD HPLC: 5.7 %
HCT VFR BLD CALC: 40.9 %
HDLC SERPL-MCNC: 130 MG/DL
HGB BLD-MCNC: 13.1 G/DL
IMM GRANULOCYTES NFR BLD AUTO: 0.4 %
KETONES URINE: NEGATIVE MG/DL
LDLC SERPL CALC-MCNC: 85 MG/DL
LEUKOCYTE ESTERASE URINE: ABNORMAL
LYMPHOCYTES # BLD AUTO: 1.83 K/UL
LYMPHOCYTES NFR BLD AUTO: 21.7 %
MAN DIFF?: NORMAL
MCHC RBC-ENTMCNC: 31.8 PG
MCHC RBC-ENTMCNC: 32 GM/DL
MCV RBC AUTO: 99.3 FL
MONOCYTES # BLD AUTO: 0.85 K/UL
MONOCYTES NFR BLD AUTO: 10.1 %
NEUTROPHILS # BLD AUTO: 5.32 K/UL
NEUTROPHILS NFR BLD AUTO: 62.9 %
NITRITE URINE: POSITIVE
NONHDLC SERPL-MCNC: 94 MG/DL
PH URINE: 6.5
PLATELET # BLD AUTO: 300 K/UL
POTASSIUM SERPL-SCNC: 5.7 MMOL/L
PROT SERPL-MCNC: 7.6 G/DL
PROTEIN URINE: NEGATIVE MG/DL
RBC # BLD: 4.12 M/UL
RBC # FLD: 14.7 %
SODIUM SERPL-SCNC: 135 MMOL/L
SPECIFIC GRAVITY URINE: 1.01
TRIGL SERPL-MCNC: 46 MG/DL
UROBILINOGEN URINE: 0.2 MG/DL
WBC # FLD AUTO: 8.45 K/UL

## 2024-09-20 ENCOUNTER — NON-APPOINTMENT (OUTPATIENT)
Age: 74
End: 2024-09-20

## 2024-09-20 NOTE — HISTORY OF PRESENT ILLNESS
[FreeTextEntry1] : 73-year-old female, never smoker, was referred by Dr. 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 Patient underwent staging and evaluation for resection. She is status post left VATS robotic assisted lingulectomy and mediastinal lymph node dissection on 5/29/24. Pathology was consistent with a typical carcinoid tumor, 1.8 cm in size without positive nodes, pathologic stage IA2. No adjuvant treatment indicated. She continues to perform self-catheterization due to chronic urinary retention/atonic bladder. Scheduled for cataract surgery on 10/4/24 with Dr Clifford Conti.  She is pending surveillance ct in Nov.  She presents today for an 8 week follow up

## 2024-09-24 ENCOUNTER — APPOINTMENT (OUTPATIENT)
Dept: INTERNAL MEDICINE | Facility: CLINIC | Age: 74
End: 2024-09-24
Payer: COMMERCIAL

## 2024-09-24 VITALS
RESPIRATION RATE: 14 BRPM | WEIGHT: 116 LBS | SYSTOLIC BLOOD PRESSURE: 132 MMHG | HEART RATE: 73 BPM | TEMPERATURE: 98.4 F | OXYGEN SATURATION: 96 % | BODY MASS INDEX: 17.18 KG/M2 | DIASTOLIC BLOOD PRESSURE: 66 MMHG | HEIGHT: 69 IN

## 2024-09-24 DIAGNOSIS — R19.7 DIARRHEA, UNSPECIFIED: ICD-10-CM

## 2024-09-24 PROCEDURE — 99212 OFFICE O/P EST SF 10 MIN: CPT

## 2024-09-24 PROCEDURE — 36415 COLL VENOUS BLD VENIPUNCTURE: CPT

## 2024-09-24 NOTE — HISTORY OF PRESENT ILLNESS
[de-identified] : 74 yo F with  HTN, HLD, PAD , melanoma, high grade L ICA stenosis s/p L CEA with roof patch on 10/2019 and high grade R ICA stenosis s/p R CEA on 4/24/2021. neuroendocrine tumor in lung s/p left vats 5/29/24.  Continues to perform self catheterization due to chronic urinary retention/atonic bladder. - Here for repeat potassium level before her surgery. Of note she had diarrhea last night - improving this AM  note: 9/17/24 - plan for cataract surgery on 10/4/24 with Dr Clifford Conti - BP has well controlled.    derm- due- with hx of melanoma Mammo- getting it annually with Dr Mcclelland Colonoscopy - with Myron Goldberg.  over 10 years ago.   Dexa: - likely due, may have been 4 years ago.

## 2024-09-25 ENCOUNTER — NON-APPOINTMENT (OUTPATIENT)
Age: 74
End: 2024-09-25

## 2024-09-26 ENCOUNTER — APPOINTMENT (OUTPATIENT)
Dept: INTERNAL MEDICINE | Facility: CLINIC | Age: 74
End: 2024-09-26
Payer: COMMERCIAL

## 2024-09-26 ENCOUNTER — LABORATORY RESULT (OUTPATIENT)
Age: 74
End: 2024-09-26

## 2024-09-26 ENCOUNTER — APPOINTMENT (OUTPATIENT)
Dept: THORACIC SURGERY | Facility: CLINIC | Age: 74
End: 2024-09-26
Payer: COMMERCIAL

## 2024-09-26 VITALS — BODY MASS INDEX: 17.48 KG/M2 | WEIGHT: 118 LBS | HEART RATE: 74 BPM | HEIGHT: 69 IN | TEMPERATURE: 96.7 F

## 2024-09-26 VITALS — DIASTOLIC BLOOD PRESSURE: 70 MMHG | SYSTOLIC BLOOD PRESSURE: 140 MMHG

## 2024-09-26 DIAGNOSIS — E83.52 HYPERCALCEMIA: ICD-10-CM

## 2024-09-26 DIAGNOSIS — D3A.090 BENIGN CARCINOID TUMOR OF THE BRONCHUS AND LUNG: ICD-10-CM

## 2024-09-26 DIAGNOSIS — E87.1 HYPO-OSMOLALITY AND HYPONATREMIA: ICD-10-CM

## 2024-09-26 DIAGNOSIS — E87.5 HYPERKALEMIA: ICD-10-CM

## 2024-09-26 LAB
ANION GAP SERPL CALC-SCNC: 14 MMOL/L
BUN SERPL-MCNC: 26 MG/DL
CALCIUM SERPL-MCNC: 9.4 MG/DL
CHLORIDE SERPL-SCNC: 93 MMOL/L
CO2 SERPL-SCNC: 21 MMOL/L
CREAT SERPL-MCNC: 0.96 MG/DL
EGFR: 62 ML/MIN/1.73M2
GLUCOSE SERPL-MCNC: 117 MG/DL
POTASSIUM SERPL-SCNC: 5.6 MMOL/L
SODIUM SERPL-SCNC: 128 MMOL/L

## 2024-09-26 PROCEDURE — 36415 COLL VENOUS BLD VENIPUNCTURE: CPT

## 2024-09-26 PROCEDURE — 99213 OFFICE O/P EST LOW 20 MIN: CPT

## 2024-09-26 PROCEDURE — 99212 OFFICE O/P EST SF 10 MIN: CPT

## 2024-09-26 NOTE — PHYSICAL EXAM
[] : no respiratory distress [Respiration, Rhythm And Depth] : normal respiratory rhythm and effort [Exaggerated Use Of Accessory Muscles For Inspiration] : no accessory muscle use [Apical Impulse] : the apical impulse was normal [Heart Sounds] : normal S1 and S2 [Examination Of The Chest] : the chest was normal in appearance [Abnormal Walk] : normal gait [Skin Color & Pigmentation] : normal skin color and pigmentation [Oriented To Time, Place, And Person] : oriented to person, place, and time [Impaired Insight] : insight and judgment were intact

## 2024-09-26 NOTE — HISTORY OF PRESENT ILLNESS
[FreeTextEntry1] : 73-year-old female, never smoker, with a PMHx of urinary retention/atonic bladder, referred by Dr. 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 She is status post left VATS robotic assisted lingulectomy and mediastinal lymph node dissection on 5/29/24. Pathology was consistent with a typical carcinoid tumor, 1.8 cm in size without positive nodes, pathologic stage IA2. No adjuvant treatment indicated. She is scheduled for cataract surgery on 10/4/24 with Dr Clifford Conti.  She is pending surveillance ct in Nov.  She presents today for an 8 week follow up.   Patient reports that she is feeling well today. She denies issues with her breathing and her pain is well controlled. Her recent labs show hyponatremia and hyperkalemia. She is getting repeat bloodwork done today.

## 2024-09-26 NOTE — PLAN
[FreeTextEntry1] : Hyperkalemia and hyponatremia suspect relation to hydration status and the ARB. check repeat labs with hyponatremia labs -  If results remain off will need to decrease dosing of ARB or potentially stop it completely

## 2024-09-26 NOTE — ASSESSMENT
[FreeTextEntry1] : Patient is a 73 year old female, never smoker, who underwent a left VATS robotic assisted lingulectomy and lymph node dissection for a 1.8 cm typical carcinoid tumor. Surgery was on 5/29/24.  She is now about 4 months post op. She returns to assess recovery.   Overall, she is doing very well from a surgical standpoint. Has occasional pain at the port sites. Otherwise, no complaints.  She is scheduled to have cataract surgery on 10/4/24, which she can proceed with from a surgical perspective. She has been hyponatremic and hyperkalemic on recent blood work. She is having that evaluated. We will obtain a surveillance CT scan in late November and she will return at that time for review.  PLAN 1.  CT scan in November for surveillance.    I, Dr. Rader, personally performed the evaluation and management (E/M) services for this established patient who presents today with (a) new problem(s)/exacerbation of (an) existing condition(s). That E/M includes conducting the clinically appropriate interval history &/or exam, assessing all new/exacerbated conditions, and establishing a new plan of care. Today, my DEENA, [Velia Prescott], was here to observe my evaluation and management service for this new problem/exacerbated condition and follow the plan of care established by me going forward.

## 2024-09-26 NOTE — HISTORY OF PRESENT ILLNESS
[de-identified] : 74 yo F with  HTN, HLD, PAD , melanoma, high grade L ICA stenosis s/p L CEA with roof patch on 10/2019 and high grade R ICA stenosis s/p R CEA on 4/24/2021. neuroendocrine tumor in lung s/p left vats 5/29/24.  Continues to perform self catheterization due to chronic urinary retention/atonic bladder. - Here for repeat potassium and sodium before upcoming surgery,   note: 9/17/24 - plan for cataract surgery on 10/4/24 with Dr Clifford Conti - BP has well controlled.    derm- due- with hx of melanoma Mammo- getting it annually with Dr Mcclelland Colonoscopy - with Myron Goldberg.  over 10 years ago.   Dexa: - likely due, may have been 4 years ago.

## 2024-09-27 LAB
ANION GAP SERPL CALC-SCNC: 12 MMOL/L
APPEARANCE: CLEAR
BILIRUBIN URINE: NEGATIVE
BLOOD URINE: ABNORMAL
BUN SERPL-MCNC: 23 MG/DL
CALCIUM SERPL-MCNC: 10 MG/DL
CHLORIDE SERPL-SCNC: 97 MMOL/L
CO2 SERPL-SCNC: 23 MMOL/L
COLOR: YELLOW
CREAT SERPL-MCNC: 0.98 MG/DL
EGFR: 61 ML/MIN/1.73M2
GLUCOSE QUALITATIVE U: NEGATIVE MG/DL
GLUCOSE SERPL-MCNC: 100 MG/DL
KETONES URINE: NEGATIVE MG/DL
LEUKOCYTE ESTERASE URINE: ABNORMAL
NITRITE URINE: POSITIVE
OSMOLALITY UR: 322 MOSM/KG
PH URINE: 6
POTASSIUM SERPL-SCNC: 5.3 MMOL/L
PROTEIN URINE: NEGATIVE MG/DL
SODIUM SERPL-SCNC: 132 MMOL/L
SPECIFIC GRAVITY URINE: 1.01
URATE SERPL-MCNC: 5.2 MG/DL
UROBILINOGEN URINE: 0.2 MG/DL

## 2024-09-30 ENCOUNTER — TRANSCRIPTION ENCOUNTER (OUTPATIENT)
Age: 74
End: 2024-09-30

## 2024-09-30 LAB
ALDOSTERONE SERUM: 14.2 NG/DL
OSMOLALITY SERPL: 279 MOSMOL/KG
SODIUM ?TM SUB UR QN: 30 MMOL/L

## 2024-10-02 ENCOUNTER — APPOINTMENT (OUTPATIENT)
Dept: HEART AND VASCULAR | Facility: CLINIC | Age: 74
End: 2024-10-02

## 2024-10-02 NOTE — ASU PATIENT PROFILE, ADULT - FALL HARM RISK - TYPE OF ASSESSMENT
An Advanced Directive is a type of written or verbal instruction about health care to be followed if a person becomes unable to make decisions regarding his or her medical or psychiatric treatment. A surrogate decision maker, also known as a health care proxy/agent, is a person who may act as a decision maker for an incapable person.  Patient was provided with information and declined to complete an Advance Directive/identify a Surrogate Decision Maker.
Admission

## 2024-10-02 NOTE — ASU PATIENT PROFILE, ADULT - NSICDXPASTSURGICALHX_GEN_ALL_CORE_FT
PAST SURGICAL HISTORY:  S/P ectopic pregnancy cervical 1984    S/P tonsillectomy     Surgery, elective melanoma removal, back and leg    Surgery, elective cyst removal left breast    Surgery, elective carotid B/L

## 2024-10-02 NOTE — ASU PATIENT PROFILE, ADULT - ABLE TO REACH PT
left voicemail informing pt about surgery time, and when to arrive by. Last meal @0000, no solid foods including dairy products/substitutes, chewing gum or hard candy. Can drink clear liquids up to 3 hours before surgery, including water, clear apple juice, and non-red gatorade. Bring photo ID, insurance card and form of payment to the first floor. Must have an escort that is 18+ and they must have a photo ID with them. Remove all jewelry, piercings, and contacts before coming to the hospital./no

## 2024-10-02 NOTE — ASU PATIENT PROFILE, ADULT - NSICDXPASTMEDICALHX_GEN_ALL_CORE_FT
PAST MEDICAL HISTORY:  Asthma     Carotid artery stenosis     History of lung surgery     HLD (hyperlipidemia)     HTN (hypertension)     Lung cancer left    Osteoporosis     Vgivnena-Mdgj-Bvpehvf syndrome     Seasonal allergies     UTI (urinary tract infection)

## 2024-10-02 NOTE — ASU PATIENT PROFILE, ADULT - FALL HARM RISK - FACTORS NURSING JUDGEMENT
No Gen: elderly female, in no acute distress  Eyes: PERRL, EOMI  ENT: oropharynx clear, mildly dry mucous membranes  Card: regular rate and rhythm, normal radial pulse  Resp: Breath sounds clear bilaterally, respirations normal  Abd: soft, no focal tenderness hypoactive bowel sounds normal pitch,   Msk: MAEx4 without focal deformities, bilateral lower extremity lymphedema which is chronic,   Skin: dorsum of bilateral feet+ open sores with discharge,   Neuro: Alert and oriented, no focal deficits, no motor or sensory deficits Gen: elderly obese female, in no acute distress, no respiratory discomfort, + ill-appearing but non-toxic  Eyes: PERRL, EOMI  ENT: oropharynx clear, mildly dry mucous membranes  Card: regular rate and rhythm, normal radial pulse  Resp: Breath sounds clear bilaterally, respirations normal  Abd: soft, no focal tenderness, + hypoactive bowel sounds normal pitch,   Msk: MAEx4 without focal deformities, bilateral lower extremity lymphedema which is chronic   Skin: dorsum of bilateral feet+ open sores with + foul-smelling discharge,   Neuro: Alert and oriented, no focal deficits, no motor or sensory deficits

## 2024-10-04 ENCOUNTER — TRANSCRIPTION ENCOUNTER (OUTPATIENT)
Age: 74
End: 2024-10-04

## 2024-10-04 ENCOUNTER — OUTPATIENT (OUTPATIENT)
Dept: OUTPATIENT SERVICES | Facility: HOSPITAL | Age: 74
LOS: 1 days | Discharge: HOME | End: 2024-10-04

## 2024-10-04 VITALS
OXYGEN SATURATION: 96 % | RESPIRATION RATE: 18 BRPM | SYSTOLIC BLOOD PRESSURE: 168 MMHG | HEART RATE: 86 BPM | DIASTOLIC BLOOD PRESSURE: 76 MMHG | TEMPERATURE: 100 F

## 2024-10-04 VITALS
SYSTOLIC BLOOD PRESSURE: 150 MMHG | HEIGHT: 69 IN | WEIGHT: 115.52 LBS | HEART RATE: 84 BPM | DIASTOLIC BLOOD PRESSURE: 73 MMHG | RESPIRATION RATE: 16 BRPM | TEMPERATURE: 99 F | OXYGEN SATURATION: 98 %

## 2024-10-04 DIAGNOSIS — Z87.59 PERSONAL HISTORY OF OTHER COMPLICATIONS OF PREGNANCY, CHILDBIRTH AND THE PUERPERIUM: Chronic | ICD-10-CM

## 2024-10-04 DIAGNOSIS — Z90.89 ACQUIRED ABSENCE OF OTHER ORGANS: Chronic | ICD-10-CM

## 2024-10-04 DIAGNOSIS — Z41.9 ENCOUNTER FOR PROCEDURE FOR PURPOSES OTHER THAN REMEDYING HEALTH STATE, UNSPECIFIED: Chronic | ICD-10-CM

## 2024-10-04 LAB — RENIN ACTIVITY, PLASMA: 12.32 NG/ML/HR

## 2024-10-04 DEVICE — LENS IOL TECNIS EYHANCE DIB00 16.0D
Type: IMPLANTABLE DEVICE | Site: LEFT | Status: NON-FUNCTIONAL
Removed: 2024-10-04

## 2024-10-04 RX ORDER — TROPICAMIDE 1 %
1 DROPS OPHTHALMIC (EYE)
Refills: 0 | Status: COMPLETED | OUTPATIENT
Start: 2024-10-04 | End: 2024-10-04

## 2024-10-04 RX ORDER — TETRACAINE HCL 0.5 %
1 DROPS OPHTHALMIC (EYE) ONCE
Refills: 0 | Status: COMPLETED | OUTPATIENT
Start: 2024-10-04 | End: 2024-10-04

## 2024-10-04 RX ORDER — ACETAMINOPHEN 325 MG
650 TABLET ORAL ONCE
Refills: 0 | Status: DISCONTINUED | OUTPATIENT
Start: 2024-10-04 | End: 2024-10-04

## 2024-10-04 RX ORDER — CYCLOPENTOLATE HCL 1 %
1 DROPS OPHTHALMIC (EYE)
Refills: 0 | Status: COMPLETED | OUTPATIENT
Start: 2024-10-04 | End: 2024-10-04

## 2024-10-04 RX ORDER — KETOROLAC TROMETHAMINE 0.5 %
1 DROPS OPHTHALMIC (EYE)
Refills: 0 | Status: COMPLETED | OUTPATIENT
Start: 2024-10-04 | End: 2024-10-04

## 2024-10-04 RX ORDER — PHENYLEPHRINE HYDROCHLORIDE 100 MG/ML
1 SOLUTION/ DROPS OPHTHALMIC
Refills: 0 | Status: COMPLETED | OUTPATIENT
Start: 2024-10-04 | End: 2024-10-04

## 2024-10-04 RX ORDER — SODIUM CHLORIDE IRRIG SOLUTION 0.9 %
500 SOLUTION, IRRIGATION IRRIGATION
Refills: 0 | Status: DISCONTINUED | OUTPATIENT
Start: 2024-10-04 | End: 2024-10-04

## 2024-10-04 RX ADMIN — Medication 1 DROP(S): at 07:51

## 2024-10-04 RX ADMIN — Medication 1 DROP(S): at 08:01

## 2024-10-04 RX ADMIN — PHENYLEPHRINE HYDROCHLORIDE 1 DROP(S): 100 SOLUTION/ DROPS OPHTHALMIC at 07:56

## 2024-10-04 RX ADMIN — PHENYLEPHRINE HYDROCHLORIDE 1 DROP(S): 100 SOLUTION/ DROPS OPHTHALMIC at 07:51

## 2024-10-04 RX ADMIN — PHENYLEPHRINE HYDROCHLORIDE 1 DROP(S): 100 SOLUTION/ DROPS OPHTHALMIC at 08:01

## 2024-10-04 RX ADMIN — Medication 1 DROP(S): at 07:56

## 2024-10-04 NOTE — OPERATIVE REPORT - OPERATIVE RPOSRT DETAILS
DATE OF SURGERY: 10-    SURGEON NAME:  SOPHIA RAMIREZ MD    ANESTHESIA: MAC    OPERATION: Cataract Extraction with Femtosecond Laser & IOL Implantation    INTRAOCULAR LENS: WINDY DIB00 16.0 D    PREOPERATIVE DIAGNOSIS: Cataract & Astigmatism Left eye    POSTOPERATIVE DIAGNOSIS: Same    COMPLICATIONS:	None    ESTIMATED BLOOD LOSS: <1 cc	    PROCEDURE:    The patient was brought to the Femtosecond laser suite at [facility] operating room.  The patient’s left eye was anesthetized with Tetracaine drops.  A Methylene blue marking pen was used to make alignment marks at the limbus at the 3 and 9 o’clock positions.  Next, programming of the laser was reviewed including the program for creation of corneal relaxing incisions for the correction of congenital astigmatism, nuclear lens softening, and capsulotomy.  The patient was then placed in a supine position.  The suction ring for the laser was placed onto the eye.   The fiducial marks on the suction ring were aligned with the Methylene blue marks that had been previously placed on the cornea.  Suction was applied and the suction ring was docked to the laser.   OCT scanning took place and all relevant ocular surfaces were identified.  Next, the pre-programmed treatments for the creation of the corneal relaxing incisions, nuclear lens softening, and capsulotomy were performed.   All pre-programmed treatments were successfully completed.   Next, the suction ring was removed.   The patient was taken to the main operating room.    The left  eye was prepped and draped in the usual fashion, except that PCMX was used in place of betadine.  A wire lid speculum was inserted.  Additional numbing took place with the installation of more Tetracaine.  A Watson scissor was used to open the conjunctiva and Tenon’s in the inferotemporal quadrant.  Next, an anesthetic mixture consisting of 50% 4% Lidocaine, 50% 0.75% Marcaine, and an ampule of Wydase was injected using a BSS cannula.  A total of 3 cc was injected below Tenon’s capsule.  Next, a superior rectus traction suture was placed.  A fornix based conjunctival flap was raised using a Watson scissor.  Hemostasis was achieved using a wet field cautery.   Next, a 69 McFall blade was used to make a vertical incision of about 50% depth, 3 mm length, at 0.5 mm posterior to the limbus.  A Montandon blade was then used to dissect a scleral tunnel to the arcades.  Next, a 15-degree superblade was used to create incisions at 10 and 2 o’clock positions.      Following this, a 2.75 mm keratome was used to open the main wound.   Next, MPF Lidocaine was injected into the anterior chamber followed by Epinephrine.  Next, Vision blue was injected into the anterior chamber and irrigated out after a period of 20 seconds.  The anterior chamber was then filled with Amvisc plus.  Next, a capsulorexis forcep was used to remove the anterior lens capsule.  Hydro dissection was then performed.  The lens nucleus was prolapsed into the anterior chamber.  The nucleus was then removed with the phaco tip of the WINDY Signature machine.  The cortex was removed with the Transformer IA tip.  The posterior capsule was polished with a Graham scratcher and the sub-incisional cortex was removed using a bimanual technique employing a Simcoe cannula.      The eye was then filled with Healon.  A posterior chamber intraocular lens was inserted into the capsular bag.  The Healon was removed using the Transformer IA tip.  Miochol was injected to constrict the pupil.  The wound was closed superiorly using two interrupted 10-O nylon sutures with the knots buried into the cornea.  The side port incisions were infiltrated with BSS on an air cannula.  Next, injections of Gentamycin and Solumedrol were delivered sub-conjunctivally.  The traction suture was then cut and removed.  The speculum was removed.   Pilocarpine drops were then instilled into the eye.  The lid was closed.  Ilotycin ointment was applied to the lids.  A patch and shield were then applied.  The patient was returned to post-op holding area in good condition having tolerated the procedure well.

## 2024-10-04 NOTE — ASU DISCHARGE PLAN (ADULT/PEDIATRIC) - NS MD DC FALL RISK RISK
For information on Fall & Injury Prevention, visit: [email or web address] OR  [email or web address] OR  https://www.cdc.gov/steadi/patient.html

## 2024-10-04 NOTE — PRE-ANESTHESIA EVALUATION ADULT - NSANTHTOTALSCORECAL_ENT_A_CORE
History & Physical    Eladio Singh                           Baby's First Name =  Undecided  YOB: 2023      Gender: female BW: 7 lb 10.4 oz (3470 g)   Age: 6 hours Obstetrician: DEYVI PINEDA    Gestational Age: 39w5d            MATERNAL INFORMATION     Mother's Name: Sonya Singh    Age: 30 y.o.              PREGNANCY INFORMATION           Maternal /Para:      Information for the patient's mother:  Sonya Singh [[id number]]     Patient Active Problem List   Diagnosis   • Insulin controlled gestational diabetes mellitus (GDM) during pregnancy, antepartum   • 32 weeks gestation of pregnancy   • Elevated blood pressure affecting pregnancy, antepartum   • SGA (small for gestational age), fetal, affecting care of mother, antepartum, third trimester, other fetus   • Insulin dependent type 2 diabetes mellitus, controlled (HCC)   • Pregnancy        Prenatal records, US and labs reviewed.    PRENATAL RECORDS:    Prenatal Course: benign; Transfer of care ~32 weeks      MATERNAL PRENATAL LABS:      MBT: A-  RUBELLA: immune  HBsAg:Negative   RPR:  Non Reactive  HIV: Negative  HEP C Ab: Negative  UDS: Not Done  GBS Culture: Negative  Genetic Testing: Low Risk  COVID 19 Screen: Not Done    PRENATAL ULTRASOUND :    Normal             MATERNAL MEDICAL, SOCIAL, GENETIC AND FAMILY HISTORY      Past Medical History:   Diagnosis Date   • Gestational diabetes    • History of tonsillectomy    • Ovarian cyst           Family, Maternal or History of DDH, CHD, Renal, HSV, MRSA and Genetic:     Non-significant    Maternal Medications:     Information for the patient's mother:  Sonya Singh [[id number]]   Pharmacy Consult, , Does not apply, Daily  docusate sodium, 100 mg, Oral, BID  ePHEDrine Sulfate, , ,   ibuprofen, 600 mg, Oral, Q6H  insulin lispro, 0-14 Units, Subcutaneous, TID AC                LABOR AND DELIVERY SUMMARY        Rupture date:  2022   Rupture time:  8:15  "AM  ROM prior to Delivery: 0h 45m     Antibiotics during Labor: No   EOS Calculator Screen: With well appearing baby supports Routine Vitals and Care    YOB: 2023   Time of birth:  9:00 AM  Delivery type:  Vaginal, Spontaneous   Presentation/Position: Vertex;   Occiput Anterior         APGAR SCORES:    Totals: 7   7                        INFORMATION     Vital Signs Temp:  [97.9 °F (36.6 °C)-99.8 °F (37.7 °C)] 98.2 °F (36.8 °C)  Pulse:  [110-160] 110  Resp:  [] 68  BP: (84)/(42) 84/42   Birth Weight: 3470 g (7 lb 10.4 oz)   Birth Length: (inches) 20.25   Birth Head Circumference: Head Circumference: 34 cm (13.39\")     Current Weight: Weight: 3470 g (7 lb 10.4 oz)   Weight Change from Birth Weight: 0%           PHYSICAL EXAMINATION     General appearance Alert and active .   Skin  Nevus simplex bilateral eyelids. Small flat erythematous circular area on inner right thigh (?early hemangioma)   HEENT: AFSF.  Positive RR bilaterally. Palate intact.    Chest Clear breath sounds bilaterally. No distress.   Heart  Normal rate and rhythm.  No murmur  Normal pulses.    Abdomen + BS.  Soft, non-tender. No mass/HSM   Genitalia  Normal  Patent anus   Trunk and Spine Spine normal and intact.  No atypical dimpling   Extremities  Clavicles intact.  No hip clicks/clunks.   Neuro Normal reflexes.  Normal Tone             LABORATORY AND RADIOLOGY RESULTS      LABS:    Recent Results (from the past 96 hour(s))   POC Glucose Once    Collection Time: 22  9:41 AM    Specimen: Blood   Result Value Ref Range    Glucose 76 75 - 110 mg/dL   Cord Blood Evaluation    Collection Time: 22  9:45 AM    Specimen: Umbilical Cord; Cord Blood   Result Value Ref Range    ABO Type A     RH type Positive     TESFAYE IgG Negative    POC Glucose Once    Collection Time: 22  1:12 PM    Specimen: Blood   Result Value Ref Range    Glucose 60 (L) 75 - 110 mg/dL       XRAYS: N/A    No orders to display               " DIAGNOSIS / ASSESSMENT / PLAN OF TREATMENT      ___________________________________________________________    TERM INFANT    HISTORY:  Gestational Age: 39w5d; female  Vaginal, Spontaneous; Vertex  BW: 7 lb 10.4 oz (3470 g)  Mother is planning to breast feed  No maternal UDS    PLAN:   Normal  care.   Cordstat per protocol  Bili and  State Screen per routine  Parents to make follow up appointment with PCP before discharge  ___________________________________________________________    TRANSIENT TACHYPNEA OF THE     HISTORY:  Infant was admitted to the transitional nursery due to respiratory distress.  Required CPAP using Roberto-T  6 cms pressure and oxygen up to 40%.  Patient improved, and was weaned off oxygen and CPAP by 4 hours of age  Transferred to the Nursery for further care.    PLAN:  Normal  care  Follow clinically for any increased WOB and/or oxygen requirement  ___________________________________________________________    INFANT OF A DIABETIC MOTHER     HISTORY:  Mother with diabetes in pregnancy treated with insulin (at night)  Initial Blood sugars = 76, 60.   F/U blood sugars = pending    PLAN:  Blood glucose protocol  Frequent feeds  ___________________________________________________________                                                                 DISCHARGE PLANNING             HEALTHCARE MAINTENANCE     CCHD     Car Seat Challenge Test     [place] Screen     [place] Screen           Vitamin K  phytonadione (VITAMIN K) injection 1 mg first administered on 2022  9:45 AM    Erythromycin Eye Ointment  erythromycin (ROMYCIN) ophthalmic ointment first administered on 2022  9:45 AM    Hepatitis B Vaccine  There is no immunization history for the selected administration types on file for this patient.            FOLLOW UP APPOINTMENTS     1) PCP: [facility] ( Dr. Chinedu Reyes)            PENDING TEST  RESULTS AT TIME OF DISCHARGE      1) [place] STATE  SCREEN  2) CORDSTAT          PARENT  UPDATE  / SIGNATURE     Infant examined. Chart, PNR, and L/D summary reviewed.    Parents updated inclusive of the following:  - care  -infant feeds  -blood glucoses  -routine  screens    Parent questions were addressed.      Staci Ceja, ISH  2022  15:01 EDT     2

## 2024-10-07 DIAGNOSIS — E78.5 HYPERLIPIDEMIA, UNSPECIFIED: ICD-10-CM

## 2024-10-07 DIAGNOSIS — H52.202 UNSPECIFIED ASTIGMATISM, LEFT EYE: ICD-10-CM

## 2024-10-07 DIAGNOSIS — Z88.0 ALLERGY STATUS TO PENICILLIN: ICD-10-CM

## 2024-10-07 DIAGNOSIS — I10 ESSENTIAL (PRIMARY) HYPERTENSION: ICD-10-CM

## 2024-10-07 DIAGNOSIS — J45.909 UNSPECIFIED ASTHMA, UNCOMPLICATED: ICD-10-CM

## 2024-10-07 DIAGNOSIS — Z85.118 PERSONAL HISTORY OF OTHER MALIGNANT NEOPLASM OF BRONCHUS AND LUNG: ICD-10-CM

## 2024-10-07 DIAGNOSIS — Z79.51 LONG TERM (CURRENT) USE OF INHALED STEROIDS: ICD-10-CM

## 2024-10-07 DIAGNOSIS — Z79.82 LONG TERM (CURRENT) USE OF ASPIRIN: ICD-10-CM

## 2024-10-07 DIAGNOSIS — M81.0 AGE-RELATED OSTEOPOROSIS WITHOUT CURRENT PATHOLOGICAL FRACTURE: ICD-10-CM

## 2024-10-17 RX ORDER — VALSARTAN 160 MG/1
160 TABLET, COATED ORAL
Qty: 90 | Refills: 3 | Status: ACTIVE | COMMUNITY
Start: 2024-10-17

## 2024-10-22 PROBLEM — Z98.890 OTHER SPECIFIED POSTPROCEDURAL STATES: Chronic | Status: ACTIVE | Noted: 2024-10-04

## 2024-10-22 PROBLEM — C34.90 MALIGNANT NEOPLASM OF UNSPECIFIED PART OF UNSPECIFIED BRONCHUS OR LUNG: Chronic | Status: ACTIVE | Noted: 2024-10-04

## 2024-11-05 ENCOUNTER — APPOINTMENT (OUTPATIENT)
Dept: VASCULAR SURGERY | Facility: CLINIC | Age: 74
End: 2024-11-05
Payer: COMMERCIAL

## 2024-11-05 VITALS
HEART RATE: 84 BPM | SYSTOLIC BLOOD PRESSURE: 106 MMHG | BODY MASS INDEX: 17.48 KG/M2 | WEIGHT: 118 LBS | HEIGHT: 69 IN | DIASTOLIC BLOOD PRESSURE: 70 MMHG

## 2024-11-05 PROCEDURE — 99213 OFFICE O/P EST LOW 20 MIN: CPT

## 2024-11-05 PROCEDURE — 93880 EXTRACRANIAL BILAT STUDY: CPT

## 2024-11-06 ENCOUNTER — APPOINTMENT (OUTPATIENT)
Dept: CT IMAGING | Facility: HOSPITAL | Age: 74
End: 2024-11-06

## 2024-11-06 ENCOUNTER — OUTPATIENT (OUTPATIENT)
Dept: OUTPATIENT SERVICES | Facility: HOSPITAL | Age: 74
LOS: 1 days | End: 2024-11-06
Payer: COMMERCIAL

## 2024-11-06 DIAGNOSIS — Z41.9 ENCOUNTER FOR PROCEDURE FOR PURPOSES OTHER THAN REMEDYING HEALTH STATE, UNSPECIFIED: Chronic | ICD-10-CM

## 2024-11-06 DIAGNOSIS — Z90.89 ACQUIRED ABSENCE OF OTHER ORGANS: Chronic | ICD-10-CM

## 2024-11-06 DIAGNOSIS — Z87.59 PERSONAL HISTORY OF OTHER COMPLICATIONS OF PREGNANCY, CHILDBIRTH AND THE PUERPERIUM: Chronic | ICD-10-CM

## 2024-11-06 PROCEDURE — 71250 CT THORAX DX C-: CPT

## 2024-11-06 PROCEDURE — 71250 CT THORAX DX C-: CPT | Mod: 26

## 2024-11-11 NOTE — PATIENT PROFILE ADULT - NSTRANSFEREYEGLASSESPAIRS_GEN_A_NUR
----- Message from FER James sent at 11/11/2024 10:29 AM CST -----  Advise patient ultrasound of liver, gallbladder, bile ducts is normal.   Elevated liver numbers due to alcohol.  Recommendations as per visit.  
1 pair

## 2024-11-14 ENCOUNTER — APPOINTMENT (OUTPATIENT)
Dept: THORACIC SURGERY | Facility: CLINIC | Age: 74
End: 2024-11-14
Payer: COMMERCIAL

## 2024-11-14 VITALS
HEART RATE: 80 BPM | BODY MASS INDEX: 23.16 KG/M2 | WEIGHT: 118 LBS | TEMPERATURE: 97.6 F | HEIGHT: 60 IN | OXYGEN SATURATION: 93 %

## 2024-11-14 DIAGNOSIS — Z85.118 ENCOUNTER FOR FOLLOW-UP EXAMINATION AFTER COMPLETED TREATMENT FOR MALIGNANT NEOPLASM: ICD-10-CM

## 2024-11-14 DIAGNOSIS — Z08 ENCOUNTER FOR FOLLOW-UP EXAMINATION AFTER COMPLETED TREATMENT FOR MALIGNANT NEOPLASM: ICD-10-CM

## 2024-11-14 PROCEDURE — 99213 OFFICE O/P EST LOW 20 MIN: CPT

## 2024-11-19 VITALS — DIASTOLIC BLOOD PRESSURE: 90 MMHG | SYSTOLIC BLOOD PRESSURE: 140 MMHG

## 2024-11-20 ENCOUNTER — APPOINTMENT (OUTPATIENT)
Dept: PULMONOLOGY | Facility: CLINIC | Age: 74
End: 2024-11-20
Payer: COMMERCIAL

## 2024-11-20 VITALS
HEART RATE: 65 BPM | OXYGEN SATURATION: 94 % | BODY MASS INDEX: 23.16 KG/M2 | DIASTOLIC BLOOD PRESSURE: 80 MMHG | HEIGHT: 60 IN | WEIGHT: 118 LBS | TEMPERATURE: 97.9 F | SYSTOLIC BLOOD PRESSURE: 132 MMHG

## 2024-11-20 DIAGNOSIS — J44.9 CHRONIC OBSTRUCTIVE PULMONARY DISEASE, UNSPECIFIED: ICD-10-CM

## 2024-11-20 DIAGNOSIS — D3A.090 BENIGN CARCINOID TUMOR OF THE BRONCHUS AND LUNG: ICD-10-CM

## 2024-11-20 DIAGNOSIS — R91.1 SOLITARY PULMONARY NODULE: ICD-10-CM

## 2024-11-20 PROCEDURE — 99214 OFFICE O/P EST MOD 30 MIN: CPT | Mod: 25

## 2024-11-20 PROCEDURE — 94664 DEMO&/EVAL PT USE INHALER: CPT

## 2024-11-20 RX ORDER — ALBUTEROL SULFATE 2.5 MG/3ML
(2.5 MG/3ML) SOLUTION RESPIRATORY (INHALATION)
Qty: 1 | Refills: 3 | Status: ACTIVE | COMMUNITY
Start: 2024-11-20 | End: 1900-01-01

## 2024-11-20 RX ORDER — NEBULIZER ACCESSORIES
KIT MISCELLANEOUS
Qty: 1 | Refills: 0 | Status: ACTIVE | COMMUNITY
Start: 2024-11-20 | End: 1900-01-01

## 2024-11-20 RX ORDER — SODIUM CHLORIDE FOR INHALATION 7 %
7 VIAL, NEBULIZER (ML) INHALATION
Qty: 1 | Refills: 5 | Status: ACTIVE | COMMUNITY
Start: 2024-11-20 | End: 1900-01-01

## 2024-12-11 RX ORDER — CILOSTAZOL 100 MG/1
100 TABLET ORAL
Qty: 180 | Refills: 3 | Status: DISCONTINUED | COMMUNITY
Start: 2024-12-11 | End: 2024-12-11

## 2024-12-12 ENCOUNTER — APPOINTMENT (OUTPATIENT)
Dept: INTERNAL MEDICINE | Facility: CLINIC | Age: 74
End: 2024-12-12
Payer: COMMERCIAL

## 2024-12-12 VITALS
SYSTOLIC BLOOD PRESSURE: 120 MMHG | DIASTOLIC BLOOD PRESSURE: 68 MMHG | BODY MASS INDEX: 17.48 KG/M2 | OXYGEN SATURATION: 96 % | HEIGHT: 69 IN | RESPIRATION RATE: 14 BRPM | WEIGHT: 118 LBS | HEART RATE: 68 BPM | TEMPERATURE: 98.2 F

## 2024-12-12 DIAGNOSIS — J44.9 CHRONIC OBSTRUCTIVE PULMONARY DISEASE, UNSPECIFIED: ICD-10-CM

## 2024-12-12 DIAGNOSIS — Z01.818 ENCOUNTER FOR OTHER PREPROCEDURAL EXAMINATION: ICD-10-CM

## 2024-12-12 DIAGNOSIS — I73.9 PERIPHERAL VASCULAR DISEASE, UNSPECIFIED: ICD-10-CM

## 2024-12-12 PROCEDURE — 36415 COLL VENOUS BLD VENIPUNCTURE: CPT

## 2024-12-12 PROCEDURE — 99214 OFFICE O/P EST MOD 30 MIN: CPT

## 2024-12-12 RX ORDER — CILOSTAZOL 50 MG/1
50 TABLET ORAL
Qty: 180 | Refills: 2 | Status: DISCONTINUED | COMMUNITY
Start: 2024-12-11 | End: 2024-12-12

## 2024-12-12 RX ORDER — CILOSTAZOL 50 MG/1
50 TABLET ORAL
Qty: 180 | Refills: 2 | Status: ACTIVE | COMMUNITY
Start: 2024-12-12 | End: 1900-01-01

## 2024-12-13 ENCOUNTER — NON-APPOINTMENT (OUTPATIENT)
Age: 74
End: 2024-12-13

## 2024-12-13 LAB
ALBUMIN SERPL ELPH-MCNC: 4.4 G/DL
ALP BLD-CCNC: 143 U/L
ALT SERPL-CCNC: 21 U/L
ANION GAP SERPL CALC-SCNC: 14 MMOL/L
AST SERPL-CCNC: 22 U/L
BASOPHILS # BLD AUTO: 0.09 K/UL
BASOPHILS NFR BLD AUTO: 1 %
BILIRUB SERPL-MCNC: 0.4 MG/DL
BUN SERPL-MCNC: 33 MG/DL
CALCIUM SERPL-MCNC: 9.7 MG/DL
CHLORIDE SERPL-SCNC: 101 MMOL/L
CO2 SERPL-SCNC: 23 MMOL/L
CREAT SERPL-MCNC: 1.05 MG/DL
EGFR: 55 ML/MIN/1.73M2
EOSINOPHIL # BLD AUTO: 0.18 K/UL
EOSINOPHIL NFR BLD AUTO: 2 %
GLUCOSE SERPL-MCNC: 95 MG/DL
HCT VFR BLD CALC: 39.7 %
HGB BLD-MCNC: 12.6 G/DL
IMM GRANULOCYTES NFR BLD AUTO: 0.3 %
LYMPHOCYTES # BLD AUTO: 1.55 K/UL
LYMPHOCYTES NFR BLD AUTO: 17.5 %
MAN DIFF?: NORMAL
MCHC RBC-ENTMCNC: 31 PG
MCHC RBC-ENTMCNC: 31.7 G/DL
MCV RBC AUTO: 97.8 FL
MONOCYTES # BLD AUTO: 0.71 K/UL
MONOCYTES NFR BLD AUTO: 8 %
NEUTROPHILS # BLD AUTO: 6.3 K/UL
NEUTROPHILS NFR BLD AUTO: 71.2 %
PLATELET # BLD AUTO: 359 K/UL
POTASSIUM SERPL-SCNC: 5.6 MMOL/L
PROT SERPL-MCNC: 7.4 G/DL
RBC # BLD: 4.06 M/UL
RBC # FLD: 14.9 %
SODIUM SERPL-SCNC: 138 MMOL/L
WBC # FLD AUTO: 8.86 K/UL

## 2024-12-24 ENCOUNTER — APPOINTMENT (OUTPATIENT)
Dept: INTERNAL MEDICINE | Facility: CLINIC | Age: 74
End: 2024-12-24

## 2024-12-31 NOTE — ASU PATIENT PROFILE, ADULT - NS PREOP UNDERSTANDS INFO
No solid food/dairy/candy/gum after 11:30pm Thursday; water allowed before 04:30am Friday; patient reminded to come with photo ID/insurance/credit card; dress in comfortable clothes; no jewelries/contact lens/valuable; no smoking/alcohol drinking/recreational drug use Thursday; escort to have photo ID; address and callback number was given/yes

## 2024-12-31 NOTE — ASU PATIENT PROFILE, ADULT - VISION (WITH CORRECTIVE LENSES IF THE PATIENT USUALLY WEARS THEM):
Caller would like to discuss an/a Order for IGG Blood Draw. Writer advised caller of callback within 24-72 hours.    Patient Name: Senia Chisholm  Caller Name: Sj  Name of Facility: [facility]  Callback Number: [phone]  Best Availability: as soon as possible  Fax Number:   Sj faxed over to [phone]  Additional Info: Sj from [facility] stated that the Patient needs an order put in immediately for a IGG Blood Draw.  Sj would like a Nurse to call him back at phone number above.  Patient needs this down as soon as possible.  Did you confirm the message with the caller?: no    Thank you,  Jamia Doe   Normal vision: sees adequately in most situations; can see medication labels, newsprint

## 2024-12-31 NOTE — ASU PATIENT PROFILE, ADULT - NSSUBSTANCEUSE_GEN_ALL_CORE_SD
-Was on NRB due to aspiration and de sating. Now on RA.  -No need for ABX as per team.  -Stable pulse Ox.  -SLP following.   never used

## 2024-12-31 NOTE — ASU PATIENT PROFILE, ADULT - NSICDXPASTSURGICALHX_GEN_ALL_CORE_FT
PAST SURGICAL HISTORY:  S/P cataract extraction left    S/P ectopic pregnancy cervical 1984    S/P tonsillectomy     Surgery, elective melanoma removal, back and leg    Surgery, elective cyst removal left breast    Surgery, elective carotid B/L

## 2024-12-31 NOTE — ASU PATIENT PROFILE, ADULT - NSICDXPASTMEDICALHX_GEN_ALL_CORE_FT
PAST MEDICAL HISTORY:  Asthma     Carotid artery stenosis     History of lung surgery     HLD (hyperlipidemia)     HTN (hypertension)     Lung cancer left    Osteoporosis     Ubnlhkoe-Byjf-Mtxxjrc syndrome     Seasonal allergies     UTI (urinary tract infection)

## 2025-01-01 RX ORDER — SODIUM CHLORIDE 9 MG/ML
1000 INJECTION, SOLUTION INTRAVENOUS
Refills: 0 | Status: DISCONTINUED | OUTPATIENT
Start: 2025-01-03 | End: 2025-01-03

## 2025-01-03 ENCOUNTER — NON-APPOINTMENT (OUTPATIENT)
Age: 75
End: 2025-01-03

## 2025-01-03 ENCOUNTER — OUTPATIENT (OUTPATIENT)
Dept: OUTPATIENT SERVICES | Facility: HOSPITAL | Age: 75
LOS: 1 days | Discharge: HOME | End: 2025-01-03

## 2025-01-03 VITALS
HEIGHT: 69 IN | WEIGHT: 116.84 LBS | TEMPERATURE: 98 F | SYSTOLIC BLOOD PRESSURE: 179 MMHG | OXYGEN SATURATION: 99 % | DIASTOLIC BLOOD PRESSURE: 78 MMHG | RESPIRATION RATE: 14 BRPM | HEART RATE: 72 BPM

## 2025-01-03 VITALS
OXYGEN SATURATION: 95 % | HEART RATE: 73 BPM | RESPIRATION RATE: 16 BRPM | TEMPERATURE: 99 F | SYSTOLIC BLOOD PRESSURE: 101 MMHG | DIASTOLIC BLOOD PRESSURE: 51 MMHG

## 2025-01-03 DIAGNOSIS — Z41.9 ENCOUNTER FOR PROCEDURE FOR PURPOSES OTHER THAN REMEDYING HEALTH STATE, UNSPECIFIED: Chronic | ICD-10-CM

## 2025-01-03 DIAGNOSIS — Z98.49 CATARACT EXTRACTION STATUS, UNSPECIFIED EYE: Chronic | ICD-10-CM

## 2025-01-03 DIAGNOSIS — Z90.89 ACQUIRED ABSENCE OF OTHER ORGANS: Chronic | ICD-10-CM

## 2025-01-03 DIAGNOSIS — Z87.59 PERSONAL HISTORY OF OTHER COMPLICATIONS OF PREGNANCY, CHILDBIRTH AND THE PUERPERIUM: Chronic | ICD-10-CM

## 2025-01-03 DEVICE — LENS IOL TECNIS EYHANCE DIB00 16.5D
Type: IMPLANTABLE DEVICE | Site: RIGHT | Status: NON-FUNCTIONAL
Removed: 2025-01-03

## 2025-01-03 RX ORDER — PHENYLEPHRINE HYDROCHLORIDE 25 MG/ML
1 SOLUTION OPHTHALMIC
Refills: 0 | Status: COMPLETED | OUTPATIENT
Start: 2025-01-03 | End: 2025-01-03

## 2025-01-03 RX ORDER — TROPICAMIDE
1 POWDER (GRAM) MISCELLANEOUS
Refills: 0 | Status: COMPLETED | OUTPATIENT
Start: 2025-01-03 | End: 2025-01-03

## 2025-01-03 RX ORDER — VALSARTAN 80 MG/1
40 TABLET ORAL
Refills: 0 | DISCHARGE

## 2025-01-03 RX ORDER — ACETAMINOPHEN 80 MG/.8ML
1000 SOLUTION/ DROPS ORAL ONCE
Refills: 0 | Status: DISCONTINUED | OUTPATIENT
Start: 2025-01-03 | End: 2025-01-03

## 2025-01-03 RX ORDER — OXYCODONE HCL 15 MG
5 TABLET ORAL ONCE
Refills: 0 | Status: DISCONTINUED | OUTPATIENT
Start: 2025-01-03 | End: 2025-01-03

## 2025-01-03 RX ORDER — TETRACAINE HCL 0.5 %
1 DROPS OPHTHALMIC (EYE) ONCE
Refills: 0 | Status: COMPLETED | OUTPATIENT
Start: 2025-01-03 | End: 2025-01-03

## 2025-01-03 RX ORDER — ONDANSETRON 4 MG/1
4 TABLET ORAL ONCE
Refills: 0 | Status: DISCONTINUED | OUTPATIENT
Start: 2025-01-03 | End: 2025-01-03

## 2025-01-03 RX ORDER — SODIUM CHLORIDE 9 MG/ML
500 INJECTION, SOLUTION INTRAVENOUS
Refills: 0 | Status: DISCONTINUED | OUTPATIENT
Start: 2025-01-03 | End: 2025-01-03

## 2025-01-03 RX ORDER — CYCLOPENTOLATE HCL 1 %
1 DROPS OPHTHALMIC (EYE)
Refills: 0 | Status: COMPLETED | OUTPATIENT
Start: 2025-01-03 | End: 2025-01-03

## 2025-01-03 RX ORDER — KETOROLAC TROMETHAMINE 4 MG/ML
1 SOLUTION/ DROPS OPHTHALMIC
Refills: 0 | Status: COMPLETED | OUTPATIENT
Start: 2025-01-03 | End: 2025-01-03

## 2025-01-03 RX ADMIN — PHENYLEPHRINE HYDROCHLORIDE 1 DROP(S): 25 SOLUTION OPHTHALMIC at 06:37

## 2025-01-03 RX ADMIN — KETOROLAC TROMETHAMINE 1 DROP(S): 4 SOLUTION/ DROPS OPHTHALMIC at 06:42

## 2025-01-03 RX ADMIN — Medication 1 DROP(S): at 06:37

## 2025-01-03 RX ADMIN — KETOROLAC TROMETHAMINE 1 DROP(S): 4 SOLUTION/ DROPS OPHTHALMIC at 06:37

## 2025-01-03 RX ADMIN — PHENYLEPHRINE HYDROCHLORIDE 1 DROP(S): 25 SOLUTION OPHTHALMIC at 06:43

## 2025-01-03 RX ADMIN — Medication 1 DROP(S): at 06:47

## 2025-01-03 RX ADMIN — PHENYLEPHRINE HYDROCHLORIDE 1 DROP(S): 25 SOLUTION OPHTHALMIC at 06:47

## 2025-01-03 RX ADMIN — Medication 1 DROP(S): at 06:42

## 2025-01-03 RX ADMIN — KETOROLAC TROMETHAMINE 1 DROP(S): 4 SOLUTION/ DROPS OPHTHALMIC at 06:47

## 2025-01-03 NOTE — PRE-ANESTHESIA EVALUATION ADULT - NSANTHDISPORD_GEN_ALL_CORE
PACU

## 2025-01-03 NOTE — ASU PREOP CHECKLIST - STERILIZATION AFFIRMATION
"Please refrain from chiropractic work for now, especially the neck or any \"high velocity or thrust techniques\".   We will repeat the blood pressure, if it is still high, we will try migraine prevention medication to see if it will help.   "
n/a

## 2025-01-03 NOTE — OPERATIVE REPORT - OPERATIVE RPOSRT DETAILS
DATE OF SURGERY:    SURGEON NAME:  SOPHIA RAMIREZ MD    ANESTHESIA: MAC    OPERATION: Cataract Extraction with Femtosecond Laser & IOL Implantation right eye    INTRAOCULAR LENS: Technis DIB00 16.5 D    PREOPERATIVE DIAGNOSIS: Cataract & Astigmatism right eye    POSTOPERATIVE DIAGNOSIS: Same    COMPLICATIONS None    ESTIMATED BLOOD LOSS: <1 cc	    PROCEDURE:    The patient was brought to the Femtosecond laser suite at [facility] operating room.  The patient’s right eye was anesthetized with Tetracaine drops.  A Methylene blue marking pen was used to make alignment marks at the limbus at the 3 and 9 o’clock positions.  Next, programming of the laser was reviewed including the program for creation of corneal relaxing incisions for the correction of congenital astigmatism, nuclear lens softening, and capsulotomy.  The patient was then placed in a supine position.  The suction ring for the laser was placed onto the eye.   The fiducial marks on the suction ring were aligned with the Methylene blue marks that had been previously placed on the cornea.  Suction was applied and the suction ring was docked to the laser.   OCT scanning took place and all relevant ocular surfaces were identified.  Next, the pre-programmed treatments for the creation of the corneal relaxing incisions, nuclear lens softening, and capsulotomy were performed.   All pre-programmed treatments were successfully completed.   Next, the suction ring was removed.   The patient was taken to the main operating room.    The right eye was prepped and draped in the usual fashion.  A wire lid speculum was inserted.  Additional numbing took place with the installation of more Tetracaine.  A Watson scissor was used to open the conjunctiva and Tenon’s in the inferotemporal quadrant.  Next, an anesthetic mixture consisting of 50% 4% Lidocaine, 50% 0.75% Marcaine, and an ampule of Wydase was injected using a BSS cannula.  A total of 3 cc was injected below Tenon’s capsule.  Next, a superior rectus traction suture was placed.  A fornix based conjunctival flap was raised using a Waston scissor.  Hemostasis was achieved using a wet field cautery.   Next, a 69 [place] blade was used to make a vertical incision of about 50% depth, 3 mm length, at 0.5 mm posterior to the limbus.  A [place] blade was then used to dissect a scleral tunnel to the arcades.  Next, a 15-degree superblade was used to create incisions at 10 and 2 o’clock positions.      Following this, a 2.75 mm keratome was used to open the main wound.   Next, MPF Lidocaine was injected into the anterior chamber followed by Epinephrine.  Next, Vision blue was injected into the anterior chamber and irrigated out after a period of 20 seconds.  The anterior chamber was then filled with Amvisc plus.  Next, a capsulorexis forcep was used to remove the anterior lens capsule.  Hydro dissection was then performed.  The lens nucleus was prolapsed into the anterior chamber.  The nucleus was then removed with the phaco tip of the WINDY Signature machine.  The cortex was removed with the Transformer IA tip.  The posterior capsule was polished with a Graham scratcher and the sub-incisional cortex was removed using a bimanual technique employing a Simcoe cannula.      The eye was then filled with Healon.  A posterior chamber intraocular lens was inserted into the capsular bag.  The Healon was removed using the Transformer IA tip.  Miochol was injected to constrict the pupil.  The wound was closed superiorly using two interrupted 10-O nylon sutures with the knots buried into the cornea.  The side port incisions were infiltrated with BSS on an air cannula.  Next, injections of Ancef and Solumedrol were delivered sub-conjunctivally.  The traction suture was then cut and removed.  The speculum was removed.   Pilocarpine drops were then instilled into the eye.  The lid was closed.  Ilotycin ointment was applied to the lids.  A patch and shield were then applied.  The patient was returned to post-op holding area in good condition having tolerated the procedure well.

## 2025-01-06 DIAGNOSIS — J44.89 OTHER SPECIFIED CHRONIC OBSTRUCTIVE PULMONARY DISEASE: ICD-10-CM

## 2025-01-06 DIAGNOSIS — M81.0 AGE-RELATED OSTEOPOROSIS WITHOUT CURRENT PATHOLOGICAL FRACTURE: ICD-10-CM

## 2025-01-06 DIAGNOSIS — I10 ESSENTIAL (PRIMARY) HYPERTENSION: ICD-10-CM

## 2025-01-06 DIAGNOSIS — Z88.0 ALLERGY STATUS TO PENICILLIN: ICD-10-CM

## 2025-01-06 DIAGNOSIS — H25.811 COMBINED FORMS OF AGE-RELATED CATARACT, RIGHT EYE: ICD-10-CM

## 2025-01-06 DIAGNOSIS — Z85.118 PERSONAL HISTORY OF OTHER MALIGNANT NEOPLASM OF BRONCHUS AND LUNG: ICD-10-CM

## 2025-01-06 DIAGNOSIS — H52.201 UNSPECIFIED ASTIGMATISM, RIGHT EYE: ICD-10-CM

## 2025-01-06 DIAGNOSIS — E78.5 HYPERLIPIDEMIA, UNSPECIFIED: ICD-10-CM

## 2025-01-06 DIAGNOSIS — Z79.82 LONG TERM (CURRENT) USE OF ASPIRIN: ICD-10-CM

## 2025-01-06 DIAGNOSIS — Z91.041 RADIOGRAPHIC DYE ALLERGY STATUS: ICD-10-CM

## 2025-01-13 ENCOUNTER — LABORATORY RESULT (OUTPATIENT)
Age: 75
End: 2025-01-13

## 2025-01-13 ENCOUNTER — APPOINTMENT (OUTPATIENT)
Dept: INTERNAL MEDICINE | Facility: CLINIC | Age: 75
End: 2025-01-13
Payer: COMMERCIAL

## 2025-01-13 VITALS — RESPIRATION RATE: 14 BRPM | SYSTOLIC BLOOD PRESSURE: 144 MMHG | DIASTOLIC BLOOD PRESSURE: 84 MMHG

## 2025-01-13 VITALS
HEIGHT: 69 IN | OXYGEN SATURATION: 96 % | WEIGHT: 118 LBS | SYSTOLIC BLOOD PRESSURE: 180 MMHG | TEMPERATURE: 98.2 F | BODY MASS INDEX: 17.48 KG/M2 | DIASTOLIC BLOOD PRESSURE: 90 MMHG | RESPIRATION RATE: 14 BRPM | HEART RATE: 70 BPM

## 2025-01-13 DIAGNOSIS — I10 ESSENTIAL (PRIMARY) HYPERTENSION: ICD-10-CM

## 2025-01-13 DIAGNOSIS — D69.2 OTHER NONTHROMBOCYTOPENIC PURPURA: ICD-10-CM

## 2025-01-13 PROCEDURE — 36415 COLL VENOUS BLD VENIPUNCTURE: CPT

## 2025-01-13 PROCEDURE — 99214 OFFICE O/P EST MOD 30 MIN: CPT

## 2025-01-13 PROCEDURE — G2211 COMPLEX E/M VISIT ADD ON: CPT | Mod: NC

## 2025-01-14 ENCOUNTER — NON-APPOINTMENT (OUTPATIENT)
Age: 75
End: 2025-01-14

## 2025-01-14 LAB
ALBUMIN SERPL ELPH-MCNC: 4.8 G/DL
ALP BLD-CCNC: 158 U/L
ALT SERPL-CCNC: 22 U/L
ANION GAP SERPL CALC-SCNC: 13 MMOL/L
APPEARANCE: CLEAR
APTT BLD: 31.7 SEC
AST SERPL-CCNC: 25 U/L
BASOPHILS # BLD AUTO: 0.07 K/UL
BASOPHILS NFR BLD AUTO: 0.9 %
BILIRUB SERPL-MCNC: 0.4 MG/DL
BILIRUBIN URINE: NEGATIVE
BLOOD URINE: NEGATIVE
BUN SERPL-MCNC: 33 MG/DL
CALCIUM SERPL-MCNC: 10.4 MG/DL
CHLORIDE SERPL-SCNC: 99 MMOL/L
CO2 SERPL-SCNC: 22 MMOL/L
COLOR: YELLOW
CREAT SERPL-MCNC: 0.96 MG/DL
EGFR: 62 ML/MIN/1.73M2
EOSINOPHIL # BLD AUTO: 0.25 K/UL
EOSINOPHIL NFR BLD AUTO: 3.2 %
GLUCOSE QUALITATIVE U: NEGATIVE MG/DL
GLUCOSE SERPL-MCNC: 84 MG/DL
HCT VFR BLD CALC: 37.7 %
HGB BLD-MCNC: 12.7 G/DL
IMM GRANULOCYTES NFR BLD AUTO: 0.4 %
INR PPP: 0.88 RATIO
KETONES URINE: NEGATIVE MG/DL
LEUKOCYTE ESTERASE URINE: ABNORMAL
LYMPHOCYTES # BLD AUTO: 1.72 K/UL
LYMPHOCYTES NFR BLD AUTO: 21.8 %
MAN DIFF?: NORMAL
MCHC RBC-ENTMCNC: 31.4 PG
MCHC RBC-ENTMCNC: 33.7 G/DL
MCV RBC AUTO: 93.3 FL
MONOCYTES # BLD AUTO: 0.65 K/UL
MONOCYTES NFR BLD AUTO: 8.2 %
NEUTROPHILS # BLD AUTO: 5.16 K/UL
NEUTROPHILS NFR BLD AUTO: 65.5 %
NITRITE URINE: NEGATIVE
PH URINE: 5.5
PLATELET # BLD AUTO: 380 K/UL
POTASSIUM SERPL-SCNC: 4.7 MMOL/L
PROT SERPL-MCNC: 7.8 G/DL
PROTEIN URINE: NEGATIVE MG/DL
PT BLD: 10.4 SEC
RBC # BLD: 4.04 M/UL
RBC # FLD: 14.8 %
SODIUM SERPL-SCNC: 134 MMOL/L
SPECIFIC GRAVITY URINE: 1.01
UROBILINOGEN URINE: 0.2 MG/DL
WBC # FLD AUTO: 7.88 K/UL

## 2025-03-17 ENCOUNTER — NON-APPOINTMENT (OUTPATIENT)
Age: 75
End: 2025-03-17

## 2025-03-19 ENCOUNTER — OUTPATIENT (OUTPATIENT)
Dept: OUTPATIENT SERVICES | Facility: HOSPITAL | Age: 75
LOS: 1 days | End: 2025-03-19
Payer: COMMERCIAL

## 2025-03-19 ENCOUNTER — APPOINTMENT (OUTPATIENT)
Dept: CT IMAGING | Facility: HOSPITAL | Age: 75
End: 2025-03-19

## 2025-03-19 DIAGNOSIS — Z41.9 ENCOUNTER FOR PROCEDURE FOR PURPOSES OTHER THAN REMEDYING HEALTH STATE, UNSPECIFIED: Chronic | ICD-10-CM

## 2025-03-19 DIAGNOSIS — Z90.89 ACQUIRED ABSENCE OF OTHER ORGANS: Chronic | ICD-10-CM

## 2025-03-19 DIAGNOSIS — Z87.59 PERSONAL HISTORY OF OTHER COMPLICATIONS OF PREGNANCY, CHILDBIRTH AND THE PUERPERIUM: Chronic | ICD-10-CM

## 2025-03-19 DIAGNOSIS — Z98.49 CATARACT EXTRACTION STATUS, UNSPECIFIED EYE: Chronic | ICD-10-CM

## 2025-03-19 PROCEDURE — 71250 CT THORAX DX C-: CPT | Mod: 26

## 2025-03-19 PROCEDURE — 71250 CT THORAX DX C-: CPT

## 2025-03-27 ENCOUNTER — APPOINTMENT (OUTPATIENT)
Dept: THORACIC SURGERY | Facility: CLINIC | Age: 75
End: 2025-03-27
Payer: COMMERCIAL

## 2025-03-27 ENCOUNTER — NON-APPOINTMENT (OUTPATIENT)
Age: 75
End: 2025-03-27

## 2025-03-27 VITALS
TEMPERATURE: 97 F | DIASTOLIC BLOOD PRESSURE: 72 MMHG | SYSTOLIC BLOOD PRESSURE: 163 MMHG | HEIGHT: 69 IN | BODY MASS INDEX: 17.48 KG/M2 | WEIGHT: 118 LBS | HEART RATE: 73 BPM

## 2025-03-27 DIAGNOSIS — R07.89 OTHER CHEST PAIN: ICD-10-CM

## 2025-03-27 DIAGNOSIS — R91.1 SOLITARY PULMONARY NODULE: ICD-10-CM

## 2025-03-27 DIAGNOSIS — Z09 ENCOUNTER FOR FOLLOW-UP EXAMINATION AFTER COMPLETED TREATMENT FOR CONDITIONS OTHER THAN MALIGNANT NEOPLASM: ICD-10-CM

## 2025-03-27 PROCEDURE — 99213 OFFICE O/P EST LOW 20 MIN: CPT

## 2025-04-11 ENCOUNTER — TRANSCRIPTION ENCOUNTER (OUTPATIENT)
Age: 75
End: 2025-04-11

## 2025-05-05 NOTE — ASU PATIENT PROFILE, ADULT - HISTORY OF COVID-19 VACCINATION
[FreeTextEntry1] : Documented by Regina Will acting as a scribe for Dr. Mckeon. 05/02/2025   All medical record entries made by the scribe were at my, Dr. Mckeon, direction and personally dictated by me on 05/02/2025. I have reviewed the chart an agree that the record accurately reflects my personal performance of the history, physical exam, assessment and plan. I have also personally directed, reviewed, and agreed with the chart. Yes

## 2025-06-05 ENCOUNTER — OUTPATIENT (OUTPATIENT)
Dept: OUTPATIENT SERVICES | Facility: HOSPITAL | Age: 75
LOS: 1 days | End: 2025-06-05
Payer: COMMERCIAL

## 2025-06-05 ENCOUNTER — APPOINTMENT (OUTPATIENT)
Dept: INTERNAL MEDICINE | Facility: CLINIC | Age: 75
End: 2025-06-05
Payer: COMMERCIAL

## 2025-06-05 DIAGNOSIS — Z41.9 ENCOUNTER FOR PROCEDURE FOR PURPOSES OTHER THAN REMEDYING HEALTH STATE, UNSPECIFIED: Chronic | ICD-10-CM

## 2025-06-05 DIAGNOSIS — Z87.59 PERSONAL HISTORY OF OTHER COMPLICATIONS OF PREGNANCY, CHILDBIRTH AND THE PUERPERIUM: Chronic | ICD-10-CM

## 2025-06-05 DIAGNOSIS — J44.9 CHRONIC OBSTRUCTIVE PULMONARY DISEASE, UNSPECIFIED: ICD-10-CM

## 2025-06-05 DIAGNOSIS — Z90.89 ACQUIRED ABSENCE OF OTHER ORGANS: Chronic | ICD-10-CM

## 2025-06-05 DIAGNOSIS — R07.81 PLEURODYNIA: ICD-10-CM

## 2025-06-05 DIAGNOSIS — Z98.49 CATARACT EXTRACTION STATUS, UNSPECIFIED EYE: Chronic | ICD-10-CM

## 2025-06-05 PROCEDURE — 36415 COLL VENOUS BLD VENIPUNCTURE: CPT

## 2025-06-05 PROCEDURE — 71100 X-RAY EXAM RIBS UNI 2 VIEWS: CPT

## 2025-06-05 PROCEDURE — G2211 COMPLEX E/M VISIT ADD ON: CPT | Mod: NC

## 2025-06-05 PROCEDURE — 99214 OFFICE O/P EST MOD 30 MIN: CPT

## 2025-06-05 PROCEDURE — 71100 X-RAY EXAM RIBS UNI 2 VIEWS: CPT | Mod: 26,LT

## 2025-06-06 ENCOUNTER — NON-APPOINTMENT (OUTPATIENT)
Age: 75
End: 2025-06-06

## 2025-06-06 LAB
ALBUMIN SERPL ELPH-MCNC: 4.4 G/DL
ALP BLD-CCNC: 123 U/L
ALT SERPL-CCNC: 25 U/L
ANION GAP SERPL CALC-SCNC: 13 MMOL/L
AST SERPL-CCNC: 18 U/L
BASOPHILS # BLD AUTO: 0.07 K/UL
BASOPHILS NFR BLD AUTO: 0.9 %
BILIRUB SERPL-MCNC: 0.3 MG/DL
BUN SERPL-MCNC: 30 MG/DL
CALCIUM SERPL-MCNC: 9.7 MG/DL
CHLORIDE SERPL-SCNC: 104 MMOL/L
CK SERPL-CCNC: 115 U/L
CO2 SERPL-SCNC: 22 MMOL/L
CREAT SERPL-MCNC: 1.06 MG/DL
EGFRCR SERPLBLD CKD-EPI 2021: 55 ML/MIN/1.73M2
EOSINOPHIL # BLD AUTO: 0.17 K/UL
EOSINOPHIL NFR BLD AUTO: 2.2 %
GLUCOSE SERPL-MCNC: 97 MG/DL
HCT VFR BLD CALC: 38.1 %
HGB BLD-MCNC: 11.8 G/DL
IMM GRANULOCYTES NFR BLD AUTO: 0.3 %
LYMPHOCYTES # BLD AUTO: 1.62 K/UL
LYMPHOCYTES NFR BLD AUTO: 20.8 %
MAN DIFF?: NORMAL
MCHC RBC-ENTMCNC: 31 G/DL
MCHC RBC-ENTMCNC: 31.6 PG
MCV RBC AUTO: 101.9 FL
MONOCYTES # BLD AUTO: 0.83 K/UL
MONOCYTES NFR BLD AUTO: 10.6 %
NEUTROPHILS # BLD AUTO: 5.09 K/UL
NEUTROPHILS NFR BLD AUTO: 65.2 %
PLATELET # BLD AUTO: 342 K/UL
POTASSIUM SERPL-SCNC: 5.2 MMOL/L
PROT SERPL-MCNC: 7.2 G/DL
RBC # BLD: 3.74 M/UL
RBC # FLD: 14.7 %
SODIUM SERPL-SCNC: 139 MMOL/L
WBC # FLD AUTO: 7.8 K/UL

## 2025-06-09 ENCOUNTER — NON-APPOINTMENT (OUTPATIENT)
Age: 75
End: 2025-06-09

## 2025-06-09 ENCOUNTER — OUTPATIENT (OUTPATIENT)
Dept: OUTPATIENT SERVICES | Facility: HOSPITAL | Age: 75
LOS: 1 days | End: 2025-06-09
Payer: COMMERCIAL

## 2025-06-09 DIAGNOSIS — Z90.89 ACQUIRED ABSENCE OF OTHER ORGANS: Chronic | ICD-10-CM

## 2025-06-09 DIAGNOSIS — Z98.49 CATARACT EXTRACTION STATUS, UNSPECIFIED EYE: Chronic | ICD-10-CM

## 2025-06-09 DIAGNOSIS — Z87.59 PERSONAL HISTORY OF OTHER COMPLICATIONS OF PREGNANCY, CHILDBIRTH AND THE PUERPERIUM: Chronic | ICD-10-CM

## 2025-06-09 DIAGNOSIS — Z41.9 ENCOUNTER FOR PROCEDURE FOR PURPOSES OTHER THAN REMEDYING HEALTH STATE, UNSPECIFIED: Chronic | ICD-10-CM

## 2025-06-09 PROCEDURE — 71046 X-RAY EXAM CHEST 2 VIEWS: CPT | Mod: 26

## 2025-06-09 PROCEDURE — 71046 X-RAY EXAM CHEST 2 VIEWS: CPT

## 2025-06-12 ENCOUNTER — APPOINTMENT (OUTPATIENT)
Dept: THORACIC SURGERY | Facility: CLINIC | Age: 75
End: 2025-06-12
Payer: COMMERCIAL

## 2025-06-12 PROCEDURE — 99213 OFFICE O/P EST LOW 20 MIN: CPT | Mod: 95

## 2025-06-17 ENCOUNTER — OUTPATIENT (OUTPATIENT)
Dept: OUTPATIENT SERVICES | Facility: HOSPITAL | Age: 75
LOS: 1 days | End: 2025-06-17
Payer: COMMERCIAL

## 2025-06-17 DIAGNOSIS — Z98.49 CATARACT EXTRACTION STATUS, UNSPECIFIED EYE: Chronic | ICD-10-CM

## 2025-06-17 DIAGNOSIS — Z87.59 PERSONAL HISTORY OF OTHER COMPLICATIONS OF PREGNANCY, CHILDBIRTH AND THE PUERPERIUM: Chronic | ICD-10-CM

## 2025-06-17 DIAGNOSIS — Z41.9 ENCOUNTER FOR PROCEDURE FOR PURPOSES OTHER THAN REMEDYING HEALTH STATE, UNSPECIFIED: Chronic | ICD-10-CM

## 2025-06-17 DIAGNOSIS — Z90.89 ACQUIRED ABSENCE OF OTHER ORGANS: Chronic | ICD-10-CM

## 2025-06-17 PROCEDURE — 71046 X-RAY EXAM CHEST 2 VIEWS: CPT | Mod: 26

## 2025-06-17 PROCEDURE — 71046 X-RAY EXAM CHEST 2 VIEWS: CPT

## 2025-08-19 RX ORDER — BENZONATATE 100 MG/1
100 CAPSULE ORAL
Qty: 21 | Refills: 0 | Status: ACTIVE | COMMUNITY
Start: 2025-08-19 | End: 1900-01-01

## 2025-08-19 RX ORDER — IPRATROPIUM BROMIDE 42 UG/1
0.06 SPRAY, METERED NASAL 3 TIMES DAILY
Qty: 1 | Refills: 3 | Status: ACTIVE | COMMUNITY
Start: 2025-08-19 | End: 1900-01-01

## 2025-09-04 ENCOUNTER — APPOINTMENT (OUTPATIENT)
Dept: INTERNAL MEDICINE | Facility: CLINIC | Age: 75
End: 2025-09-04
Payer: COMMERCIAL

## 2025-09-04 VITALS
HEIGHT: 69 IN | TEMPERATURE: 97.1 F | RESPIRATION RATE: 14 BRPM | BODY MASS INDEX: 17.18 KG/M2 | DIASTOLIC BLOOD PRESSURE: 68 MMHG | SYSTOLIC BLOOD PRESSURE: 132 MMHG | OXYGEN SATURATION: 98 % | HEART RATE: 82 BPM | WEIGHT: 116 LBS

## 2025-09-04 DIAGNOSIS — J44.9 CHRONIC OBSTRUCTIVE PULMONARY DISEASE, UNSPECIFIED: ICD-10-CM

## 2025-09-04 PROCEDURE — 99212 OFFICE O/P EST SF 10 MIN: CPT | Mod: 25

## 2025-09-04 PROCEDURE — 90662 IIV NO PRSV INCREASED AG IM: CPT

## 2025-09-04 PROCEDURE — G0008: CPT

## 2025-09-15 ENCOUNTER — NON-APPOINTMENT (OUTPATIENT)
Age: 75
End: 2025-09-15

## (undated) DEVICE — STAPLER COVIDIEN ENDO GIA STANDARD HANDLE

## (undated) DEVICE — VESSEL LOOP MINI-BLUE 0.075" X 16"

## (undated) DEVICE — TROCAR ETHICON ENDOPATH XCEL BLADELESS 12MM X 100MM SMOOTH

## (undated) DEVICE — CANNULA BD & CO SUBTENONS

## (undated) DEVICE — WARMING BLANKET UPPER ADULT

## (undated) DEVICE — XI ENDOWRIST STAPLER SHEATH

## (undated) DEVICE — XI 12MM AND STAPLER CANNULA SEAL

## (undated) DEVICE — XI STAPLER SUREFORM 60

## (undated) DEVICE — SOL IRR BAL SALT 500ML

## (undated) DEVICE — SUT PROLENE 0 30" CT-1

## (undated) DEVICE — XI SEAL UNIVERSIAL 5-12MM

## (undated) DEVICE — MARKING PEN W RULER

## (undated) DEVICE — NDL ASPIRATION VIZISHOT2 25G

## (undated) DEVICE — GLV 8 PROTEXIS (WHITE)

## (undated) DEVICE — PACK ANTERIOR SEGMENT

## (undated) DEVICE — SLEEVE LAMINER INFUSION 19G

## (undated) DEVICE — ELCTR GROUNDING PAD ADULT COVIDIEN

## (undated) DEVICE — DRAPE MICROSCOPE KNOB COVER SMALL (2 PCS)

## (undated) DEVICE — VESSEL LOOP EXTRA MAXI-BLUE 0.200" X 22"

## (undated) DEVICE — XI ENDOWRIST 12 - 8 MM CANNULA REDUCER

## (undated) DEVICE — ELCTR ERASER BI-P BVL 45DEG 18G

## (undated) DEVICE — Device

## (undated) DEVICE — CANNULA IRR ANT CHAMBER 30G

## (undated) DEVICE — XI ENDOWRIST SUCTION IRRIGATOR 8MM

## (undated) DEVICE — NDL ARCHPOINT PULMONARY 21G

## (undated) DEVICE — ENDOCATCH ROBOTIC 12MM (PURPLE)

## (undated) DEVICE — ELCTR BOVIE PENCIL HANDPIECE ROCKER SWITCH 15FT

## (undated) DEVICE — TIP PHACO STR 30 DEG 20GA

## (undated) DEVICE — KNIFE FULL HANDLE ANGLE 2.75MM

## (undated) DEVICE — SUT ETHILON 10-0 12" CS160-6

## (undated) DEVICE — TROCAR ETHICON ENDOPATH XCEL BLADELESS 12MM X 100MM STABILITY

## (undated) DEVICE — VENODYNE/SCD SLEEVE CALF MEDIUM

## (undated) DEVICE — KNIFE ALCON CRESCENT ANGLED BEVEL UP 2.3MM (PINK)

## (undated) DEVICE — XI VESSEL SEALER

## (undated) DEVICE — GOWN XXL

## (undated) DEVICE — DRAPE LIGHT HANDLE COVER (BLUE)

## (undated) DEVICE — WARMING BLANKET LOWER ADULT

## (undated) DEVICE — REUSABLE TIP FLOW STR 19GA

## (undated) DEVICE — SUT PDS II 2-0 27" SH

## (undated) DEVICE — XI SEAL UNIV 5- 8 MM

## (undated) DEVICE — FUSION PACK

## (undated) DEVICE — TUBING STRYKER PNEUMOCLEAR SMOKE HEAT HUMID

## (undated) DEVICE — NDL HYPO REGULAR BEVEL 27G X 0.5" (GRAY)

## (undated) DEVICE — XI OBTURATOR OPTICAL BLADELESS 8MM

## (undated) DEVICE — SYR LUER LOK 3CC

## (undated) DEVICE — BIOPSY FORCEP J&J MONARCH SMOOTH CUP

## (undated) DEVICE — CHEST DRAIN PLEUR-EVAC DRY/WET ADULT-PEDS SINGLE (QUICK)

## (undated) DEVICE — DVC ASCOPE 4 SNGL USE SLIM

## (undated) DEVICE — GOWN XL W TOWEL

## (undated) DEVICE — SUT SILK 6-0 18" TG140-8

## (undated) DEVICE — D HELP - CLEARVIEW CLEARIFY SYSTEM

## (undated) DEVICE — XI DRAPE ARM

## (undated) DEVICE — XI ARM GRASPER TIP UP FENESTRATED

## (undated) DEVICE — ENDOCATCH GENERAL 15MM (PURPLE)

## (undated) DEVICE — VESSEL LOOP MAXI-BLUE 0.120" X 16"

## (undated) DEVICE — SUT VLOC 90 4-0 9" CV-23 VIOLET

## (undated) DEVICE — TROCAR ETHICON ENDOPATH XCEL BLADELESS 15MM X 100MM STABILITY

## (undated) DEVICE — ENDOCATCH GENERAL 10MM (PURPLE)

## (undated) DEVICE — NUCLEUS HYDRODISSECTOR PEARCE ANGLED 25G X 22MM

## (undated) DEVICE — KNIFE ALCON STANDARD FULL HANDLE 15 DEG (PINK)

## (undated) DEVICE — XI TIP COVER

## (undated) DEVICE — TRANSFORMER INTREPID I/A 0.3MM

## (undated) DEVICE — BEAVER BLADE MINI SHARP ALL ROUND (BLUE)

## (undated) DEVICE — ELCTR BOVIE TIP SPATULA MEGADYNE E-Z CLEAN LAPAROSCOPIC 13.5" STANDARD

## (undated) DEVICE — PACK ROBOTIC

## (undated) DEVICE — XI STAPLER SUREFORM 45

## (undated) DEVICE — DRSG GAUZE PACKTNER ROLL

## (undated) DEVICE — GLV 7.5 PROTEXIS (WHITE)

## (undated) DEVICE — TROCAR ETHICON ENDOPATH XCEL BLADELESS 5MM X 100MM STABILITY

## (undated) DEVICE — XI DRAPE COLUMN